# Patient Record
Sex: MALE | Race: WHITE | NOT HISPANIC OR LATINO | Employment: OTHER | URBAN - METROPOLITAN AREA
[De-identification: names, ages, dates, MRNs, and addresses within clinical notes are randomized per-mention and may not be internally consistent; named-entity substitution may affect disease eponyms.]

---

## 2023-10-06 ENCOUNTER — OFFICE VISIT (OUTPATIENT)
Dept: FAMILY MEDICINE | Facility: CLINIC | Age: 68
End: 2023-10-06
Payer: MEDICARE

## 2023-10-06 VITALS
TEMPERATURE: 97 F | HEART RATE: 72 BPM | SYSTOLIC BLOOD PRESSURE: 140 MMHG | WEIGHT: 183.44 LBS | HEIGHT: 72 IN | OXYGEN SATURATION: 98 % | DIASTOLIC BLOOD PRESSURE: 86 MMHG | BODY MASS INDEX: 24.85 KG/M2

## 2023-10-06 DIAGNOSIS — Z87.898 HISTORY OF ELEVATED PSA: ICD-10-CM

## 2023-10-06 DIAGNOSIS — G89.4 CHRONIC PAIN SYNDROME: ICD-10-CM

## 2023-10-06 DIAGNOSIS — H91.90 HEARING LOSS, UNSPECIFIED HEARING LOSS TYPE, UNSPECIFIED LATERALITY: Primary | ICD-10-CM

## 2023-10-06 DIAGNOSIS — R03.0 BLOOD PRESSURE ELEVATED WITHOUT HISTORY OF HTN: ICD-10-CM

## 2023-10-06 PROCEDURE — 1101F PR PT FALLS ASSESS DOC 0-1 FALLS W/OUT INJ PAST YR: ICD-10-PCS | Mod: CPTII,S$GLB,, | Performed by: FAMILY MEDICINE

## 2023-10-06 PROCEDURE — 99999 PR PBB SHADOW E&M-NEW PATIENT-LVL III: ICD-10-PCS | Mod: PBBFAC,,, | Performed by: FAMILY MEDICINE

## 2023-10-06 PROCEDURE — 99999 PR PBB SHADOW E&M-NEW PATIENT-LVL III: CPT | Mod: PBBFAC,,, | Performed by: FAMILY MEDICINE

## 2023-10-06 PROCEDURE — 3288F PR FALLS RISK ASSESSMENT DOCUMENTED: ICD-10-PCS | Mod: CPTII,S$GLB,, | Performed by: FAMILY MEDICINE

## 2023-10-06 PROCEDURE — 3079F DIAST BP 80-89 MM HG: CPT | Mod: CPTII,S$GLB,, | Performed by: FAMILY MEDICINE

## 2023-10-06 PROCEDURE — 3008F PR BODY MASS INDEX (BMI) DOCUMENTED: ICD-10-PCS | Mod: CPTII,S$GLB,, | Performed by: FAMILY MEDICINE

## 2023-10-06 PROCEDURE — 1159F PR MEDICATION LIST DOCUMENTED IN MEDICAL RECORD: ICD-10-PCS | Mod: CPTII,S$GLB,, | Performed by: FAMILY MEDICINE

## 2023-10-06 PROCEDURE — 1159F MED LIST DOCD IN RCRD: CPT | Mod: CPTII,S$GLB,, | Performed by: FAMILY MEDICINE

## 2023-10-06 PROCEDURE — 99203 OFFICE O/P NEW LOW 30 MIN: CPT | Mod: S$GLB,,, | Performed by: FAMILY MEDICINE

## 2023-10-06 PROCEDURE — 3079F PR MOST RECENT DIASTOLIC BLOOD PRESSURE 80-89 MM HG: ICD-10-PCS | Mod: CPTII,S$GLB,, | Performed by: FAMILY MEDICINE

## 2023-10-06 PROCEDURE — 1126F PR PAIN SEVERITY QUANTIFIED, NO PAIN PRESENT: ICD-10-PCS | Mod: CPTII,S$GLB,, | Performed by: FAMILY MEDICINE

## 2023-10-06 PROCEDURE — 1126F AMNT PAIN NOTED NONE PRSNT: CPT | Mod: CPTII,S$GLB,, | Performed by: FAMILY MEDICINE

## 2023-10-06 PROCEDURE — 1101F PT FALLS ASSESS-DOCD LE1/YR: CPT | Mod: CPTII,S$GLB,, | Performed by: FAMILY MEDICINE

## 2023-10-06 PROCEDURE — 3077F SYST BP >= 140 MM HG: CPT | Mod: CPTII,S$GLB,, | Performed by: FAMILY MEDICINE

## 2023-10-06 PROCEDURE — 99203 PR OFFICE/OUTPT VISIT, NEW, LEVL III, 30-44 MIN: ICD-10-PCS | Mod: S$GLB,,, | Performed by: FAMILY MEDICINE

## 2023-10-06 PROCEDURE — 3288F FALL RISK ASSESSMENT DOCD: CPT | Mod: CPTII,S$GLB,, | Performed by: FAMILY MEDICINE

## 2023-10-06 PROCEDURE — 3077F PR MOST RECENT SYSTOLIC BLOOD PRESSURE >= 140 MM HG: ICD-10-PCS | Mod: CPTII,S$GLB,, | Performed by: FAMILY MEDICINE

## 2023-10-06 PROCEDURE — 3008F BODY MASS INDEX DOCD: CPT | Mod: CPTII,S$GLB,, | Performed by: FAMILY MEDICINE

## 2023-10-06 RX ORDER — OXYCODONE AND ACETAMINOPHEN 5; 325 MG/1; MG/1
2 TABLET ORAL EVERY 4 HOURS PRN
COMMUNITY
End: 2024-01-09

## 2023-10-06 NOTE — PROGRESS NOTES
"Subjective:       Patient ID: Tae Nelson JR is a 67 y.o. male.    Chief Complaint: Establish Care      HPI Comments:       Current Outpatient Medications:     oxyCODONE-acetaminophen (PERCOCET) 5-325 mg per tablet, Take 2 tablets by mouth every 4 (four) hours as needed., Disp: , Rfl:       This my 1st time seeing this patient.  He would like to establish care.  He lives in this area.      Complains of many months or years of hearing loss and tinnitus.  No recent changes.  Never had it evaluated.    Past medical history:  Head problems with "swelling" in his nasopharynx and oropharynx.  Apparently related to infections.  Followed by Infectious Disease, though apparently not recently..  Has had biopsies done.  At 1 point he had chronic headaches and was on chronic pain medication.  No longer taking any pain medication.       Has occasional headaches now.    Saw urology years ago for elevated PSA.  Apparently had a benign biopsy.  Also there was some concern about hypogonadism, although I see a documented testosterone over 700.    Will be coming back again soon for a checkup.    Says he is had elevated blood pressures from time to time but never been diagnosed or treated for hypertension        Review of Systems   Constitutional:  Negative for activity change, appetite change and fever.   HENT:  Positive for hearing loss and tinnitus. Negative for ear discharge, ear pain and sore throat.    Respiratory:  Negative for cough and shortness of breath.    Cardiovascular:  Negative for chest pain.   Gastrointestinal:  Negative for abdominal pain, diarrhea and nausea.   Genitourinary:  Negative for difficulty urinating.   Musculoskeletal:  Negative for arthralgias and myalgias.   Neurological:  Negative for dizziness and headaches.       Objective:      Vitals:    10/06/23 1323   BP: (!) 140/86   Pulse: 72   Temp: 97 °F (36.1 °C)   TempSrc: Tympanic   SpO2: 98%   Weight: 83.2 kg (183 lb 6.8 oz)   Height: 6' (1.829 m) "   PainSc: 0-No pain     Physical Exam  Vitals and nursing note reviewed.   Constitutional:       General: He is not in acute distress.     Appearance: He is well-developed. He is not diaphoretic.   HENT:      Head: Normocephalic.      Right Ear: Tympanic membrane, ear canal and external ear normal.      Left Ear: Tympanic membrane, ear canal and external ear normal.   Neck:      Thyroid: No thyromegaly.   Cardiovascular:      Rate and Rhythm: Normal rate and regular rhythm.      Heart sounds: Normal heart sounds. No murmur heard.  Pulmonary:      Effort: Pulmonary effort is normal.      Breath sounds: Normal breath sounds. No wheezing or rales.   Abdominal:      General: There is no distension.      Palpations: Abdomen is soft.   Musculoskeletal:      Cervical back: Neck supple.   Lymphadenopathy:      Cervical: No cervical adenopathy.   Skin:     General: Skin is warm and dry.   Neurological:      Mental Status: He is alert and oriented to person, place, and time.   Psychiatric:         Behavior: Behavior normal.         Thought Content: Thought content normal.         Judgment: Judgment normal.         Assessment:       1. Hearing loss, unspecified hearing loss type, unspecified laterality    2. Chronic pain syndrome    3. History of elevated PSA    4. Blood pressure elevated without history of HTN        Plan:   Hearing loss, unspecified hearing loss type, unspecified laterality  Comments:  Normal ear exam.  Audiology consult  Orders:  -     Ambulatory referral/consult to Audiology; Future; Expected date: 10/13/2023    Chronic pain syndrome  Comments:  In the past.  Related to head and neck problems.  Not on any medication currently    History of elevated PSA  Comments:  Will follow-up with blood work at physical    Blood pressure elevated without history of HTN  Comments:  Follow-up within one-month

## 2023-10-13 ENCOUNTER — CLINICAL SUPPORT (OUTPATIENT)
Dept: AUDIOLOGY | Facility: CLINIC | Age: 68
End: 2023-10-13
Payer: MEDICARE

## 2023-10-13 DIAGNOSIS — H91.90 HEARING LOSS, UNSPECIFIED HEARING LOSS TYPE, UNSPECIFIED LATERALITY: ICD-10-CM

## 2023-10-13 DIAGNOSIS — H93.13 TINNITUS, BILATERAL: ICD-10-CM

## 2023-10-13 DIAGNOSIS — H90.3 SENSORINEURAL HEARING LOSS, BILATERAL: Primary | ICD-10-CM

## 2023-10-13 PROCEDURE — 92557 COMPREHENSIVE HEARING TEST: CPT | Mod: S$GLB,,, | Performed by: AUDIOLOGIST-HEARING AID FITTER

## 2023-10-13 PROCEDURE — 99999 PR PBB SHADOW E&M-EST. PATIENT-LVL I: ICD-10-PCS | Mod: PBBFAC,,, | Performed by: AUDIOLOGIST-HEARING AID FITTER

## 2023-10-13 PROCEDURE — 92567 TYMPANOMETRY: CPT | Mod: S$GLB,,, | Performed by: AUDIOLOGIST-HEARING AID FITTER

## 2023-10-13 PROCEDURE — 92557 PR COMPREHENSIVE HEARING TEST: ICD-10-PCS | Mod: S$GLB,,, | Performed by: AUDIOLOGIST-HEARING AID FITTER

## 2023-10-13 PROCEDURE — 92567 PR TYMPA2METRY: ICD-10-PCS | Mod: S$GLB,,, | Performed by: AUDIOLOGIST-HEARING AID FITTER

## 2023-10-13 PROCEDURE — 99999 PR PBB SHADOW E&M-EST. PATIENT-LVL I: CPT | Mod: PBBFAC,,, | Performed by: AUDIOLOGIST-HEARING AID FITTER

## 2023-10-13 NOTE — PROGRESS NOTES
Referring provider: Dr. Jack Nelson JR was seen 10/13/2023 for an audiological evaluation.  Patient complains of hearing loss in the bilateral ear starting many years ago and progressively declining. He reports tinnitus in the bilateral ear that has been present for years and is unchanged. No dizziness. No otalgia, aural fullness, pressure or otorrhea. He has family history of hearing loss (mother). He has history of loud noise exposure. Patient reports increased difficulty with speech clarity and tinnitus is now more bothersome.     Results reveal a normal-to-moderate sensorineural hearing loss 250-8000 Hz for the right ear, and a normal-to-moderately severe sensorineural hearing loss 250-8000 Hz for the left ear.   Speech Reception Thresholds were 40 dBHL for the right ear and 45 dBHL for the left ear.   Word recognition scores were fair for the right ear and good for the left ear.   Tympanograms were Type Ad for the right ear and Type Ad for the left ear.    Patient was counseled on the above findings.    Recommendations:  Hearing aids, binaural. Patient is provided a copy of his audiogram and hearing aid information packet. He will first check into insurance.   Audiogram every two years to monitor hearing loss, or sooner if any perceived changes in hearing.

## 2023-10-14 ENCOUNTER — PATIENT MESSAGE (OUTPATIENT)
Dept: ADMINISTRATIVE | Facility: HOSPITAL | Age: 68
End: 2023-10-14
Payer: MEDICARE

## 2023-12-12 ENCOUNTER — LAB VISIT (OUTPATIENT)
Dept: LAB | Facility: HOSPITAL | Age: 68
End: 2023-12-12
Attending: FAMILY MEDICINE
Payer: MEDICARE

## 2023-12-12 ENCOUNTER — OFFICE VISIT (OUTPATIENT)
Dept: FAMILY MEDICINE | Facility: CLINIC | Age: 68
End: 2023-12-12
Payer: MEDICARE

## 2023-12-12 VITALS
SYSTOLIC BLOOD PRESSURE: 140 MMHG | DIASTOLIC BLOOD PRESSURE: 90 MMHG | BODY MASS INDEX: 25.75 KG/M2 | TEMPERATURE: 97 F | HEART RATE: 86 BPM | HEIGHT: 72 IN | WEIGHT: 190.13 LBS | OXYGEN SATURATION: 98 %

## 2023-12-12 DIAGNOSIS — G89.4 CHRONIC PAIN SYNDROME: ICD-10-CM

## 2023-12-12 DIAGNOSIS — Z12.5 SCREENING FOR PROSTATE CANCER: ICD-10-CM

## 2023-12-12 DIAGNOSIS — R79.9 ABNORMAL FINDING OF BLOOD CHEMISTRY, UNSPECIFIED: ICD-10-CM

## 2023-12-12 DIAGNOSIS — I10 PRIMARY HYPERTENSION: ICD-10-CM

## 2023-12-12 DIAGNOSIS — Z00.00 ANNUAL PHYSICAL EXAM: Primary | ICD-10-CM

## 2023-12-12 DIAGNOSIS — Z12.11 SCREENING FOR COLON CANCER: ICD-10-CM

## 2023-12-12 DIAGNOSIS — Z00.00 ANNUAL PHYSICAL EXAM: ICD-10-CM

## 2023-12-12 LAB
ALBUMIN SERPL BCP-MCNC: 3.8 G/DL (ref 3.5–5.2)
ALP SERPL-CCNC: 83 U/L (ref 55–135)
ALT SERPL W/O P-5'-P-CCNC: 16 U/L (ref 10–44)
ANION GAP SERPL CALC-SCNC: 11 MMOL/L (ref 8–16)
AST SERPL-CCNC: 19 U/L (ref 10–40)
BASOPHILS # BLD AUTO: 0.06 K/UL (ref 0–0.2)
BASOPHILS NFR BLD: 1 % (ref 0–1.9)
BILIRUB SERPL-MCNC: 0.7 MG/DL (ref 0.1–1)
BUN SERPL-MCNC: 11 MG/DL (ref 8–23)
CALCIUM SERPL-MCNC: 9.3 MG/DL (ref 8.7–10.5)
CHLORIDE SERPL-SCNC: 103 MMOL/L (ref 95–110)
CHOLEST SERPL-MCNC: 176 MG/DL (ref 120–199)
CHOLEST/HDLC SERPL: 3.9 {RATIO} (ref 2–5)
CO2 SERPL-SCNC: 23 MMOL/L (ref 23–29)
COMPLEXED PSA SERPL-MCNC: 4.6 NG/ML (ref 0–4)
CREAT SERPL-MCNC: 0.9 MG/DL (ref 0.5–1.4)
DIFFERENTIAL METHOD: ABNORMAL
EOSINOPHIL # BLD AUTO: 0.1 K/UL (ref 0–0.5)
EOSINOPHIL NFR BLD: 2 % (ref 0–8)
ERYTHROCYTE [DISTWIDTH] IN BLOOD BY AUTOMATED COUNT: 11.9 % (ref 11.5–14.5)
EST. GFR  (NO RACE VARIABLE): >60 ML/MIN/1.73 M^2
ESTIMATED AVG GLUCOSE: 108 MG/DL (ref 68–131)
GLUCOSE SERPL-MCNC: 83 MG/DL (ref 70–110)
HBA1C MFR BLD: 5.4 % (ref 4–5.6)
HCT VFR BLD AUTO: 47.2 % (ref 40–54)
HCV AB SERPL QL IA: NORMAL
HDLC SERPL-MCNC: 45 MG/DL (ref 40–75)
HDLC SERPL: 25.6 % (ref 20–50)
HGB BLD-MCNC: 16.9 G/DL (ref 14–18)
IMM GRANULOCYTES # BLD AUTO: 0.01 K/UL (ref 0–0.04)
IMM GRANULOCYTES NFR BLD AUTO: 0.2 % (ref 0–0.5)
LDLC SERPL CALC-MCNC: 118.2 MG/DL (ref 63–159)
LYMPHOCYTES # BLD AUTO: 2 K/UL (ref 1–4.8)
LYMPHOCYTES NFR BLD: 33.8 % (ref 18–48)
MCH RBC QN AUTO: 33.2 PG (ref 27–31)
MCHC RBC AUTO-ENTMCNC: 35.8 G/DL (ref 32–36)
MCV RBC AUTO: 93 FL (ref 82–98)
MONOCYTES # BLD AUTO: 0.5 K/UL (ref 0.3–1)
MONOCYTES NFR BLD: 9.1 % (ref 4–15)
NEUTROPHILS # BLD AUTO: 3.2 K/UL (ref 1.8–7.7)
NEUTROPHILS NFR BLD: 53.9 % (ref 38–73)
NONHDLC SERPL-MCNC: 131 MG/DL
NRBC BLD-RTO: 0 /100 WBC
PLATELET # BLD AUTO: 208 K/UL (ref 150–450)
PMV BLD AUTO: 9.9 FL (ref 9.2–12.9)
POTASSIUM SERPL-SCNC: 3.9 MMOL/L (ref 3.5–5.1)
PROT SERPL-MCNC: 8 G/DL (ref 6–8.4)
RBC # BLD AUTO: 5.09 M/UL (ref 4.6–6.2)
SODIUM SERPL-SCNC: 137 MMOL/L (ref 136–145)
TRIGL SERPL-MCNC: 64 MG/DL (ref 30–150)
TSH SERPL DL<=0.005 MIU/L-ACNC: 1.91 UIU/ML (ref 0.4–4)
WBC # BLD AUTO: 5.94 K/UL (ref 3.9–12.7)

## 2023-12-12 PROCEDURE — 1126F PR PAIN SEVERITY QUANTIFIED, NO PAIN PRESENT: ICD-10-PCS | Mod: CPTII,S$GLB,, | Performed by: FAMILY MEDICINE

## 2023-12-12 PROCEDURE — 3288F FALL RISK ASSESSMENT DOCD: CPT | Mod: CPTII,S$GLB,, | Performed by: FAMILY MEDICINE

## 2023-12-12 PROCEDURE — 3077F PR MOST RECENT SYSTOLIC BLOOD PRESSURE >= 140 MM HG: ICD-10-PCS | Mod: CPTII,S$GLB,, | Performed by: FAMILY MEDICINE

## 2023-12-12 PROCEDURE — 99999 PR PBB SHADOW E&M-EST. PATIENT-LVL III: CPT | Mod: PBBFAC,,, | Performed by: FAMILY MEDICINE

## 2023-12-12 PROCEDURE — 99214 PR OFFICE/OUTPT VISIT, EST, LEVL IV, 30-39 MIN: ICD-10-PCS | Mod: S$GLB,,, | Performed by: FAMILY MEDICINE

## 2023-12-12 PROCEDURE — 85025 COMPLETE CBC W/AUTO DIFF WBC: CPT | Performed by: FAMILY MEDICINE

## 2023-12-12 PROCEDURE — 84153 ASSAY OF PSA TOTAL: CPT | Performed by: FAMILY MEDICINE

## 2023-12-12 PROCEDURE — 99214 OFFICE O/P EST MOD 30 MIN: CPT | Mod: S$GLB,,, | Performed by: FAMILY MEDICINE

## 2023-12-12 PROCEDURE — 80061 LIPID PANEL: CPT | Performed by: FAMILY MEDICINE

## 2023-12-12 PROCEDURE — 99999 PR PBB SHADOW E&M-EST. PATIENT-LVL III: ICD-10-PCS | Mod: PBBFAC,,, | Performed by: FAMILY MEDICINE

## 2023-12-12 PROCEDURE — 3077F SYST BP >= 140 MM HG: CPT | Mod: CPTII,S$GLB,, | Performed by: FAMILY MEDICINE

## 2023-12-12 PROCEDURE — 1159F MED LIST DOCD IN RCRD: CPT | Mod: CPTII,S$GLB,, | Performed by: FAMILY MEDICINE

## 2023-12-12 PROCEDURE — 3080F PR MOST RECENT DIASTOLIC BLOOD PRESSURE >= 90 MM HG: ICD-10-PCS | Mod: CPTII,S$GLB,, | Performed by: FAMILY MEDICINE

## 2023-12-12 PROCEDURE — 3008F BODY MASS INDEX DOCD: CPT | Mod: CPTII,S$GLB,, | Performed by: FAMILY MEDICINE

## 2023-12-12 PROCEDURE — 86803 HEPATITIS C AB TEST: CPT | Performed by: FAMILY MEDICINE

## 2023-12-12 PROCEDURE — 3288F PR FALLS RISK ASSESSMENT DOCUMENTED: ICD-10-PCS | Mod: CPTII,S$GLB,, | Performed by: FAMILY MEDICINE

## 2023-12-12 PROCEDURE — 1101F PT FALLS ASSESS-DOCD LE1/YR: CPT | Mod: CPTII,S$GLB,, | Performed by: FAMILY MEDICINE

## 2023-12-12 PROCEDURE — 84443 ASSAY THYROID STIM HORMONE: CPT | Performed by: FAMILY MEDICINE

## 2023-12-12 PROCEDURE — 3008F PR BODY MASS INDEX (BMI) DOCUMENTED: ICD-10-PCS | Mod: CPTII,S$GLB,, | Performed by: FAMILY MEDICINE

## 2023-12-12 PROCEDURE — 36415 COLL VENOUS BLD VENIPUNCTURE: CPT | Mod: PO | Performed by: FAMILY MEDICINE

## 2023-12-12 PROCEDURE — 1126F AMNT PAIN NOTED NONE PRSNT: CPT | Mod: CPTII,S$GLB,, | Performed by: FAMILY MEDICINE

## 2023-12-12 PROCEDURE — 83036 HEMOGLOBIN GLYCOSYLATED A1C: CPT | Performed by: FAMILY MEDICINE

## 2023-12-12 PROCEDURE — 1101F PR PT FALLS ASSESS DOC 0-1 FALLS W/OUT INJ PAST YR: ICD-10-PCS | Mod: CPTII,S$GLB,, | Performed by: FAMILY MEDICINE

## 2023-12-12 PROCEDURE — 3080F DIAST BP >= 90 MM HG: CPT | Mod: CPTII,S$GLB,, | Performed by: FAMILY MEDICINE

## 2023-12-12 PROCEDURE — 1159F PR MEDICATION LIST DOCUMENTED IN MEDICAL RECORD: ICD-10-PCS | Mod: CPTII,S$GLB,, | Performed by: FAMILY MEDICINE

## 2023-12-12 PROCEDURE — 80053 COMPREHEN METABOLIC PANEL: CPT | Performed by: FAMILY MEDICINE

## 2023-12-12 RX ORDER — LOSARTAN POTASSIUM 50 MG/1
50 TABLET ORAL NIGHTLY
Qty: 30 TABLET | Refills: 1 | Status: SHIPPED | OUTPATIENT
Start: 2023-12-12 | End: 2024-01-12 | Stop reason: SDUPTHER

## 2023-12-12 NOTE — PROGRESS NOTES
Subjective:       Patient ID: Tae Nelson JR is a 68 y.o. male.    Chief Complaint: Annual Exam      HPI Comments:       Current Outpatient Medications:     oxyCODONE-acetaminophen (PERCOCET) 5-325 mg per tablet, Take 2 tablets by mouth every 4 (four) hours as needed., Disp: , Rfl:     losartan (COZAAR) 50 MG tablet, Take 1 tablet (50 mg total) by mouth every evening., Disp: 30 tablet, Rfl: 1      This is our initial follow-up visit.  He establish care with me 2 months ago.      Here for general checkup today.  Says he is doing well.  Recently had hearing test and plans to get hearing aids soon.    Says he has had a colonoscopy years ago that was normal.  No follow up and does not want to do anymore.  Will do Cologuard test.      Looks like he has hypertension.  Never been treated, but willing to take medication every day.  Will start losartan.      No tobacco products.  Smokes marijuana on a non daily basis.  No alcohol.  Exercise with hunting    Nocturia 0-1 time per night      Review of Systems   Constitutional:  Negative for activity change, appetite change and fever.   HENT:  Positive for hearing loss. Negative for sore throat.    Respiratory:  Negative for cough and shortness of breath.    Cardiovascular:  Negative for chest pain.   Gastrointestinal:  Negative for abdominal pain, diarrhea and nausea.   Genitourinary:  Negative for difficulty urinating.   Musculoskeletal:  Negative for arthralgias and myalgias.   Neurological:  Negative for dizziness and headaches.       Objective:      Vitals:    12/12/23 0833   BP: (!) 140/90   Pulse: 86   Temp: 96.6 °F (35.9 °C)   TempSrc: Tympanic   SpO2: 98%   Weight: 86.3 kg (190 lb 2.4 oz)   Height: 6' (1.829 m)   PainSc: 0-No pain     Physical Exam  Vitals and nursing note reviewed.   Constitutional:       General: He is not in acute distress.     Appearance: He is well-developed. He is not diaphoretic.   HENT:      Head: Normocephalic.      Mouth/Throat:       Pharynx: No oropharyngeal exudate.   Neck:      Thyroid: No thyromegaly.   Cardiovascular:      Rate and Rhythm: Normal rate and regular rhythm.      Heart sounds: Normal heart sounds. No murmur heard.  Pulmonary:      Effort: Pulmonary effort is normal.      Breath sounds: Normal breath sounds. No wheezing or rales.   Abdominal:      General: There is no distension.      Palpations: Abdomen is soft. There is no hepatomegaly, splenomegaly or mass.      Tenderness: There is no abdominal tenderness.   Musculoskeletal:      Cervical back: Neck supple.      Right lower leg: No edema.      Left lower leg: No edema.   Lymphadenopathy:      Cervical: No cervical adenopathy.   Skin:     General: Skin is warm and dry.   Neurological:      Mental Status: He is alert and oriented to person, place, and time.   Psychiatric:         Mood and Affect: Mood normal.         Behavior: Behavior normal.         Thought Content: Thought content normal.         Judgment: Judgment normal.         Assessment:       1. Annual physical exam    2. Chronic pain syndrome    3. Primary hypertension    4. Screening for colon cancer    5. Screening for prostate cancer    6. Abnormal finding of blood chemistry, unspecified        Plan:   Annual physical exam  Comments:  Lipid profile, A1c  Orders:  -     Hemoglobin A1C; Future; Expected date: 12/12/2023  -     Comprehensive Metabolic Panel; Future; Expected date: 12/12/2023  -     CBC Auto Differential; Future; Expected date: 12/12/2023  -     Hepatitis C Antibody; Future; Expected date: 12/12/2023  -     Lipid Panel; Future; Expected date: 12/12/2023  -     TSH; Future; Expected date: 12/12/2023    Chronic pain syndrome  Comments:  No current treatments or medications    Primary hypertension  Comments:  Start losartan.  Follow-up one-month  Orders:  -     Hemoglobin A1C; Future; Expected date: 12/12/2023  -     Comprehensive Metabolic Panel; Future; Expected date: 12/12/2023  -     CBC Auto  Differential; Future; Expected date: 12/12/2023  -     TSH; Future; Expected date: 12/12/2023    Screening for colon cancer  Comments:  Declines colonoscopy.  Cologuard ordered    Screening for prostate cancer  Comments:  PSA today  Orders:  -     PSA, Screening; Future; Expected date: 12/12/2023  -     Cologuard Screening (Multitarget Stool DNA); Future; Expected date: 12/12/2023    Abnormal finding of blood chemistry, unspecified  -     Hemoglobin A1C; Future; Expected date: 12/12/2023  -     Lipid Panel; Future; Expected date: 12/12/2023    Other orders  -     losartan (COZAAR) 50 MG tablet; Take 1 tablet (50 mg total) by mouth every evening.  Dispense: 30 tablet; Refill: 1

## 2023-12-16 DIAGNOSIS — R97.20 ELEVATED PSA: Primary | ICD-10-CM

## 2023-12-18 ENCOUNTER — TELEPHONE (OUTPATIENT)
Dept: FAMILY MEDICINE | Facility: CLINIC | Age: 68
End: 2023-12-18
Payer: MEDICARE

## 2023-12-18 NOTE — TELEPHONE ENCOUNTER
Called pt n/a lvm to call back to get urology appt scheduled and go over results per Dr. Santiago   Blood work all looks good except for a slightly elevated PSA prostate test.  Need to have you see a urologist to have this looked at further.    Please schedule the urology consult I ordered

## 2023-12-18 NOTE — TELEPHONE ENCOUNTER
----- Message from Orion Santiago MD sent at 12/16/2023 11:56 AM CST -----  Blood work all looks good except for a slightly elevated PSA prostate test.  Need to have you see a urologist to have this looked at further.    Please schedule the urology consult I ordered

## 2023-12-18 NOTE — TELEPHONE ENCOUNTER
Spoke with pt wife she confirmed information informed her of results she verbalized understanding. Urology called scheduled appt for pt [id number]

## 2023-12-28 LAB — NONINV COLON CA DNA+OCC BLD SCRN STL QL: NEGATIVE

## 2023-12-29 ENCOUNTER — TELEPHONE (OUTPATIENT)
Dept: FAMILY MEDICINE | Facility: CLINIC | Age: 68
End: 2023-12-29
Payer: MEDICARE

## 2023-12-29 NOTE — TELEPHONE ENCOUNTER
----- Message from Orion Santiago MD sent at 12/29/2023  6:30 AM CST -----  Your Cologuard stool test was negative

## 2024-01-09 ENCOUNTER — OFFICE VISIT (OUTPATIENT)
Dept: UROLOGY | Facility: CLINIC | Age: 69
End: 2024-01-09
Payer: MEDICARE

## 2024-01-09 ENCOUNTER — LAB VISIT (OUTPATIENT)
Dept: LAB | Facility: HOSPITAL | Age: 69
End: 2024-01-09
Attending: UROLOGY
Payer: MEDICARE

## 2024-01-09 VITALS
SYSTOLIC BLOOD PRESSURE: 133 MMHG | WEIGHT: 190.25 LBS | BODY MASS INDEX: 25.77 KG/M2 | DIASTOLIC BLOOD PRESSURE: 93 MMHG | HEART RATE: 78 BPM | HEIGHT: 72 IN

## 2024-01-09 DIAGNOSIS — R82.81 PYURIA: ICD-10-CM

## 2024-01-09 DIAGNOSIS — R97.20 ELEVATED PSA: Primary | ICD-10-CM

## 2024-01-09 DIAGNOSIS — R97.20 ELEVATED PSA: ICD-10-CM

## 2024-01-09 PROCEDURE — 3008F BODY MASS INDEX DOCD: CPT | Mod: CPTII,S$GLB,, | Performed by: UROLOGY

## 2024-01-09 PROCEDURE — 99999 PR PBB SHADOW E&M-EST. PATIENT-LVL III: CPT | Mod: PBBFAC,,, | Performed by: UROLOGY

## 2024-01-09 PROCEDURE — 84154 ASSAY OF PSA FREE: CPT | Performed by: UROLOGY

## 2024-01-09 PROCEDURE — 81001 URINALYSIS AUTO W/SCOPE: CPT | Performed by: UROLOGY

## 2024-01-09 PROCEDURE — 82565 ASSAY OF CREATININE: CPT | Performed by: UROLOGY

## 2024-01-09 PROCEDURE — 3288F FALL RISK ASSESSMENT DOCD: CPT | Mod: CPTII,S$GLB,, | Performed by: UROLOGY

## 2024-01-09 PROCEDURE — 1160F RVW MEDS BY RX/DR IN RCRD: CPT | Mod: CPTII,S$GLB,, | Performed by: UROLOGY

## 2024-01-09 PROCEDURE — 84153 ASSAY OF PSA TOTAL: CPT | Performed by: UROLOGY

## 2024-01-09 PROCEDURE — 99204 OFFICE O/P NEW MOD 45 MIN: CPT | Mod: S$GLB,,, | Performed by: UROLOGY

## 2024-01-09 PROCEDURE — 36415 COLL VENOUS BLD VENIPUNCTURE: CPT | Performed by: UROLOGY

## 2024-01-09 PROCEDURE — 3075F SYST BP GE 130 - 139MM HG: CPT | Mod: CPTII,S$GLB,, | Performed by: UROLOGY

## 2024-01-09 PROCEDURE — 87186 SC STD MICRODIL/AGAR DIL: CPT | Performed by: UROLOGY

## 2024-01-09 PROCEDURE — 1159F MED LIST DOCD IN RCRD: CPT | Mod: CPTII,S$GLB,, | Performed by: UROLOGY

## 2024-01-09 PROCEDURE — 87077 CULTURE AEROBIC IDENTIFY: CPT | Performed by: UROLOGY

## 2024-01-09 PROCEDURE — 3080F DIAST BP >= 90 MM HG: CPT | Mod: CPTII,S$GLB,, | Performed by: UROLOGY

## 2024-01-09 PROCEDURE — 1101F PT FALLS ASSESS-DOCD LE1/YR: CPT | Mod: CPTII,S$GLB,, | Performed by: UROLOGY

## 2024-01-09 PROCEDURE — 87088 URINE BACTERIA CULTURE: CPT | Performed by: UROLOGY

## 2024-01-09 PROCEDURE — 87086 URINE CULTURE/COLONY COUNT: CPT | Performed by: UROLOGY

## 2024-01-09 NOTE — PROGRESS NOTES
Chief Complaint:   Encounter Diagnoses   Name Primary?    Elevated PSA Yes    Pyuria        HPI:  HPI Tae Nelson JR edmond 68 y.o. male who presents as a referral for elevated PSA.  Patient does not get routine healthcare.  He states he was just now getting back to seeing a doctor routinely.  He recently had a checkup with his PCP and his PSA was slightly elevated at 4.6.  In reviewing his medical records patient underwent a prostate biopsy in April of 2014 due to a PSA of 5.0.  At that time biopsy results were benign.  Patient had no further follow up with Urology after that.  He does have nocturia x1.  He has been noticing dark and foul odor urine for some time.  He denies any gross hematuria or dysuria.  He does occasionally have urgency.  Denies any urge incontinence.  He does have postvoid dribbling.    His father passed of prostate cancer.    History:  Social History     Tobacco Use    Smoking status: Never    Smokeless tobacco: Never    Tobacco comments:     Smokes weed   Substance Use Topics    Alcohol use: Never    Drug use: Never     No past medical history on file.  No past surgical history on file.  Family History   Problem Relation Age of Onset    Cancer Mother     Prostate cancer Father        Current Outpatient Medications on File Prior to Visit   Medication Sig Dispense Refill    losartan (COZAAR) 50 MG tablet Take 1 tablet (50 mg total) by mouth every evening. 30 tablet 1    oxyCODONE-acetaminophen (PERCOCET) 5-325 mg per tablet Take 2 tablets by mouth every 4 (four) hours as needed.       No current facility-administered medications on file prior to visit.        Objective:     Vitals:    01/09/24 0910   BP: (!) 133/93   Pulse: 78   Weight: 86.3 kg (190 lb 4.1 oz)   Height: 6' (1.829 m)      BMI Readings from Last 1 Encounters:   01/09/24 25.80 kg/m²          Physical Exam  No acute distress alert and oriented  Respirations even unlabored   Digital rectal exam reveals a 60 g prostate  smooth      Lab Results   Component Value Date    PSA 4.6 (H) 12/12/2023        Lab Results   Component Value Date    CREATININE 0.9 12/12/2023      Assessment:       1. Elevated PSA    2. Pyuria        Plan:     1. Elevated PSA    2. Pyuria       Orders Placed This Encounter    CULTURE, URINE    MRI Prostate W W/O Contrast    PROSTATE HEALTH INDEX (phi)    CREATININE, SERUM    Urinalysis Microscopic    POCT Urinalysis, Dipstick, Automated, W/O Scope      Elevated PSA.  BPH with lower urinary tract symptoms.  Urinalysis consistent with UTI today.  We will send a urine culture.  We will go ahead and get a prostate health index on him today.  We will also schedule MRI of his prostate.  We will see him back after these to discuss next steps.

## 2024-01-10 LAB
-2 PROPSA (PHI): 6.8 PG/ML
BACTERIA #/AREA URNS AUTO: ABNORMAL /HPF
CREAT SERPL-MCNC: 1 MG/DL (ref 0.5–1.4)
EST. GFR  (NO RACE VARIABLE): >60 ML/MIN/1.73 M^2
FREE PSA (PHI): 1 NG/ML
MICROSCOPIC COMMENT: ABNORMAL
PROSTATE HEALTH INDEX VALUE (PHI): 16.8
PSA FREE MFR SERPL: 16 %
PSA SERPL-MCNC: 6.1 NG/ML
RBC #/AREA URNS AUTO: 3 /HPF (ref 0–4)
WBC #/AREA URNS AUTO: 64 /HPF (ref 0–5)

## 2024-01-12 ENCOUNTER — LAB VISIT (OUTPATIENT)
Dept: LAB | Facility: HOSPITAL | Age: 69
End: 2024-01-12
Attending: FAMILY MEDICINE
Payer: MEDICARE

## 2024-01-12 ENCOUNTER — OFFICE VISIT (OUTPATIENT)
Dept: FAMILY MEDICINE | Facility: CLINIC | Age: 69
End: 2024-01-12
Payer: MEDICARE

## 2024-01-12 VITALS
HEIGHT: 72 IN | TEMPERATURE: 97 F | WEIGHT: 185.06 LBS | OXYGEN SATURATION: 96 % | BODY MASS INDEX: 25.06 KG/M2 | DIASTOLIC BLOOD PRESSURE: 90 MMHG | SYSTOLIC BLOOD PRESSURE: 160 MMHG

## 2024-01-12 DIAGNOSIS — I10 PRIMARY HYPERTENSION: Primary | ICD-10-CM

## 2024-01-12 DIAGNOSIS — Z12.11 SCREENING FOR COLON CANCER: ICD-10-CM

## 2024-01-12 DIAGNOSIS — R97.20 ELEVATED PSA: ICD-10-CM

## 2024-01-12 DIAGNOSIS — I10 PRIMARY HYPERTENSION: ICD-10-CM

## 2024-01-12 LAB
ANION GAP SERPL CALC-SCNC: 4 MMOL/L (ref 8–16)
BACTERIA UR CULT: ABNORMAL
BUN SERPL-MCNC: 11 MG/DL (ref 8–23)
CALCIUM SERPL-MCNC: 9.1 MG/DL (ref 8.7–10.5)
CHLORIDE SERPL-SCNC: 104 MMOL/L (ref 95–110)
CO2 SERPL-SCNC: 28 MMOL/L (ref 23–29)
CREAT SERPL-MCNC: 0.8 MG/DL (ref 0.5–1.4)
EST. GFR  (NO RACE VARIABLE): >60 ML/MIN/1.73 M^2
GLUCOSE SERPL-MCNC: 87 MG/DL (ref 70–110)
POTASSIUM SERPL-SCNC: 4.3 MMOL/L (ref 3.5–5.1)
SODIUM SERPL-SCNC: 136 MMOL/L (ref 136–145)

## 2024-01-12 PROCEDURE — 36415 COLL VENOUS BLD VENIPUNCTURE: CPT | Mod: PO | Performed by: FAMILY MEDICINE

## 2024-01-12 PROCEDURE — 3077F SYST BP >= 140 MM HG: CPT | Mod: CPTII,S$GLB,, | Performed by: FAMILY MEDICINE

## 2024-01-12 PROCEDURE — 4010F ACE/ARB THERAPY RXD/TAKEN: CPT | Mod: CPTII,S$GLB,, | Performed by: FAMILY MEDICINE

## 2024-01-12 PROCEDURE — 99999 PR PBB SHADOW E&M-EST. PATIENT-LVL III: CPT | Mod: PBBFAC,,, | Performed by: FAMILY MEDICINE

## 2024-01-12 PROCEDURE — 3080F DIAST BP >= 90 MM HG: CPT | Mod: CPTII,S$GLB,, | Performed by: FAMILY MEDICINE

## 2024-01-12 PROCEDURE — 3288F FALL RISK ASSESSMENT DOCD: CPT | Mod: CPTII,S$GLB,, | Performed by: FAMILY MEDICINE

## 2024-01-12 PROCEDURE — 1126F AMNT PAIN NOTED NONE PRSNT: CPT | Mod: CPTII,S$GLB,, | Performed by: FAMILY MEDICINE

## 2024-01-12 PROCEDURE — 80048 BASIC METABOLIC PNL TOTAL CA: CPT | Performed by: FAMILY MEDICINE

## 2024-01-12 PROCEDURE — 99214 OFFICE O/P EST MOD 30 MIN: CPT | Mod: S$GLB,,, | Performed by: FAMILY MEDICINE

## 2024-01-12 PROCEDURE — 1101F PT FALLS ASSESS-DOCD LE1/YR: CPT | Mod: CPTII,S$GLB,, | Performed by: FAMILY MEDICINE

## 2024-01-12 PROCEDURE — 3008F BODY MASS INDEX DOCD: CPT | Mod: CPTII,S$GLB,, | Performed by: FAMILY MEDICINE

## 2024-01-12 PROCEDURE — 1159F MED LIST DOCD IN RCRD: CPT | Mod: CPTII,S$GLB,, | Performed by: FAMILY MEDICINE

## 2024-01-12 RX ORDER — LOSARTAN POTASSIUM 100 MG/1
100 TABLET ORAL NIGHTLY
Qty: 30 TABLET | Refills: 1 | Status: SHIPPED | OUTPATIENT
Start: 2024-01-12 | End: 2024-02-20 | Stop reason: SDUPTHER

## 2024-01-12 NOTE — PROGRESS NOTES
Subjective:       Patient ID: Tae Nelson JR is a 68 y.o. male.    Chief Complaint: Follow-up      HPI Comments:       Current Outpatient Medications:     losartan (COZAAR) 100 MG tablet, Take 1 tablet (100 mg total) by mouth every evening., Disp: 30 tablet, Rfl: 1      One-month follow-up on hypertension.  He has been taking losartan 50 every day.  Blood pressure reading at home 133/93.  Still elevated today.  Will increase dose.      Saw Urology.  Getting an MRI of his prostate.  Concerned about some infection but will not treat only after the MRI is done.    Cologuard was done was negative.      Review of Systems   Constitutional:  Negative for activity change, appetite change and fever.   HENT:  Negative for sore throat.    Respiratory:  Negative for cough and shortness of breath.    Cardiovascular:  Negative for chest pain.   Gastrointestinal:  Negative for abdominal pain, diarrhea and nausea.   Genitourinary:  Negative for difficulty urinating.   Musculoskeletal:  Negative for arthralgias and myalgias.   Neurological:  Negative for dizziness and headaches.       Objective:      Vitals:    01/12/24 0821   BP: (!) 160/90   Temp: 97.1 °F (36.2 °C)   SpO2: 96%   Weight: 84 kg (185 lb 1.2 oz)   Height: 6' (1.829 m)   PainSc: 0-No pain     Physical Exam  Vitals and nursing note reviewed.   Constitutional:       General: He is not in acute distress.     Appearance: He is well-developed. He is not diaphoretic.   HENT:      Head: Normocephalic.   Neck:      Thyroid: No thyromegaly.   Cardiovascular:      Rate and Rhythm: Normal rate and regular rhythm.      Heart sounds: Normal heart sounds. No murmur heard.  Pulmonary:      Effort: Pulmonary effort is normal.      Breath sounds: Normal breath sounds. No wheezing or rales.   Abdominal:      General: There is no distension.      Palpations: Abdomen is soft.   Musculoskeletal:      Cervical back: Neck supple.   Lymphadenopathy:      Cervical: No cervical adenopathy.    Skin:     General: Skin is warm and dry.   Neurological:      Mental Status: He is alert and oriented to person, place, and time.   Psychiatric:         Mood and Affect: Mood normal.         Behavior: Behavior normal.         Thought Content: Thought content normal.         Judgment: Judgment normal.         Assessment:       1. Primary hypertension    2. Elevated PSA    3. Screening for colon cancer        Plan:   Primary hypertension  Comments:  Increase losartan to 100 mg.  BMP today.  Follow-up one-month  Orders:  -     Basic Metabolic Panel; Future; Expected date: 01/12/2024    Elevated PSA  Comments:  Followed by urology    Screening for colon cancer  Comments:  Paulina negative    Other orders  -     losartan (COZAAR) 100 MG tablet; Take 1 tablet (100 mg total) by mouth every evening.  Dispense: 30 tablet; Refill: 1

## 2024-01-19 ENCOUNTER — HOSPITAL ENCOUNTER (OUTPATIENT)
Dept: RADIOLOGY | Facility: HOSPITAL | Age: 69
Discharge: HOME | End: 2024-01-19
Attending: UROLOGY
Payer: MEDICARE

## 2024-01-19 DIAGNOSIS — R97.20 ELEVATED PSA: ICD-10-CM

## 2024-01-19 PROCEDURE — A9585 GADOBUTROL INJECTION: HCPCS | Mod: PN | Performed by: UROLOGY

## 2024-01-19 PROCEDURE — 72197 MRI PELVIS W/O & W/DYE: CPT | Mod: 26,,, | Performed by: RADIOLOGY

## 2024-01-19 PROCEDURE — 25500020 PHARM REV CODE 255: Mod: PN | Performed by: UROLOGY

## 2024-01-19 PROCEDURE — 72197 MRI PELVIS W/O & W/DYE: CPT | Mod: TC,PN

## 2024-01-19 RX ORDER — GADOBUTROL 604.72 MG/ML
8.5 INJECTION INTRAVENOUS
Status: COMPLETED | OUTPATIENT
Start: 2024-01-19 | End: 2024-01-19

## 2024-01-19 RX ADMIN — GADOBUTROL 8.5 ML: 604.72 INJECTION INTRAVENOUS at 08:01

## 2024-01-22 ENCOUNTER — OFFICE VISIT (OUTPATIENT)
Dept: UROLOGY | Facility: CLINIC | Age: 69
End: 2024-01-22
Payer: MEDICARE

## 2024-01-22 VITALS
TEMPERATURE: 99 F | HEART RATE: 76 BPM | DIASTOLIC BLOOD PRESSURE: 85 MMHG | HEIGHT: 72 IN | BODY MASS INDEX: 25.06 KG/M2 | WEIGHT: 185.06 LBS | RESPIRATION RATE: 16 BRPM | SYSTOLIC BLOOD PRESSURE: 123 MMHG

## 2024-01-22 DIAGNOSIS — R35.1 BENIGN PROSTATIC HYPERPLASIA WITH NOCTURIA: ICD-10-CM

## 2024-01-22 DIAGNOSIS — R97.20 ELEVATED PSA: Primary | ICD-10-CM

## 2024-01-22 DIAGNOSIS — N40.1 BENIGN PROSTATIC HYPERPLASIA WITH NOCTURIA: ICD-10-CM

## 2024-01-22 LAB
BILIRUB UR QL STRIP: NEGATIVE
GLUCOSE UR QL STRIP: NEGATIVE
KETONES UR QL STRIP: NEGATIVE
LEUKOCYTE ESTERASE UR QL STRIP: POSITIVE
PH, POC UA: 5.5
POC BLOOD, URINE: POSITIVE
POC NITRATES, URINE: NEGATIVE
PROT UR QL STRIP: NEGATIVE
SP GR UR STRIP: 1.02 (ref 1–1.03)
UROBILINOGEN UR STRIP-ACNC: 0.2 (ref 0.3–2.2)

## 2024-01-22 PROCEDURE — 3079F DIAST BP 80-89 MM HG: CPT | Mod: CPTII,S$GLB,, | Performed by: UROLOGY

## 2024-01-22 PROCEDURE — 3288F FALL RISK ASSESSMENT DOCD: CPT | Mod: CPTII,S$GLB,, | Performed by: UROLOGY

## 2024-01-22 PROCEDURE — 3008F BODY MASS INDEX DOCD: CPT | Mod: CPTII,S$GLB,, | Performed by: UROLOGY

## 2024-01-22 PROCEDURE — 1159F MED LIST DOCD IN RCRD: CPT | Mod: CPTII,S$GLB,, | Performed by: UROLOGY

## 2024-01-22 PROCEDURE — 99999 PR PBB SHADOW E&M-EST. PATIENT-LVL III: CPT | Mod: PBBFAC,,, | Performed by: UROLOGY

## 2024-01-22 PROCEDURE — 3074F SYST BP LT 130 MM HG: CPT | Mod: CPTII,S$GLB,, | Performed by: UROLOGY

## 2024-01-22 PROCEDURE — 1101F PT FALLS ASSESS-DOCD LE1/YR: CPT | Mod: CPTII,S$GLB,, | Performed by: UROLOGY

## 2024-01-22 PROCEDURE — 1160F RVW MEDS BY RX/DR IN RCRD: CPT | Mod: CPTII,S$GLB,, | Performed by: UROLOGY

## 2024-01-22 PROCEDURE — 99214 OFFICE O/P EST MOD 30 MIN: CPT | Mod: S$GLB,,, | Performed by: UROLOGY

## 2024-01-22 PROCEDURE — 81003 URINALYSIS AUTO W/O SCOPE: CPT | Mod: QW,S$GLB,, | Performed by: UROLOGY

## 2024-01-22 PROCEDURE — 4010F ACE/ARB THERAPY RXD/TAKEN: CPT | Mod: CPTII,S$GLB,, | Performed by: UROLOGY

## 2024-01-22 PROCEDURE — 1126F AMNT PAIN NOTED NONE PRSNT: CPT | Mod: CPTII,S$GLB,, | Performed by: UROLOGY

## 2024-01-22 RX ORDER — TAMSULOSIN HYDROCHLORIDE 0.4 MG/1
0.4 CAPSULE ORAL DAILY
Qty: 30 CAPSULE | Refills: 5 | Status: SHIPPED | OUTPATIENT
Start: 2024-01-22 | End: 2025-01-21

## 2024-01-22 NOTE — PROGRESS NOTES
Chief Complaint:   Encounter Diagnoses   Name Primary?    Elevated PSA Yes    Benign prostatic hyperplasia with nocturia        HPI:  HPI Tae Nelson JR edmond 68 y.o. male who presents with follow up to elevated PSA.  He has had a negative biopsy in 2014.  At that time his PSA was 5.0.  His more recent PSA was 4.6.  Repeat prostate health index shows low risk of prostate cancer.  MRI prostate shows PI-RADS 2 with a 80 g gland.  He returns today for follow-up.  He does now complain of some nocturia and slow stream.    History:  Social History     Tobacco Use    Smoking status: Never    Smokeless tobacco: Never    Tobacco comments:     Smokes weed   Substance Use Topics    Alcohol use: Never    Drug use: Yes     Types: Marijuana     History reviewed. No pertinent past medical history.  History reviewed. No pertinent surgical history.  Family History   Problem Relation Age of Onset    Prostate cancer Father     Cancer Mother     No Known Problems Maternal Aunt     No Known Problems Maternal Uncle     No Known Problems Paternal Aunt     No Known Problems Paternal Uncle     No Known Problems Paternal Grandmother     No Known Problems Paternal Grandfather     No Known Problems Maternal Grandmother     No Known Problems Maternal Grandfather     No Known Problems Other        Current Outpatient Medications on File Prior to Visit   Medication Sig Dispense Refill    losartan (COZAAR) 100 MG tablet Take 1 tablet (100 mg total) by mouth every evening. 30 tablet 1     No current facility-administered medications on file prior to visit.        Objective:     Vitals:    01/22/24 1057   BP: 123/85   BP Location: Left arm   Patient Position: Sitting   Pulse: 76   Resp: 16   Temp: 98.5 °F (36.9 °C)   TempSrc: Oral   Weight: 84 kg (185 lb 1.2 oz)   Height: 6' (1.829 m)      BMI Readings from Last 1 Encounters:   01/22/24 25.10 kg/m²          Physical Exam  No acute distress alert and oriented  Respirations even unlabored   Abdomen  is soft nontender    MRI Prostate W W/O Contrast  Narrative: EXAMINATION:  MRI PROSTATE W W/O CONTRAST    CLINICAL HISTORY:  Prostate cancer suspected;  Elevated prostate specific antigen (PSA)    TECHNIQUE:  Multiparametric MR imaging of the prostate gland is performed with and without the intravenous administration of 8.5 cc of Gadavist.    COMPARISON:  None    FINDINGS:  The prostate gland measures 5.5 x 5.0 x 6.4 cm for total volume of 80.31 cc.  The T1 sequence shows no intra prostatic hemorrhage.    Hyperplastic nodules in the transitional zone.  No suspicious transitional zone lesion.    No diffusion restriction or T2 hypointense nodule in the peripheral zone.    Prostate capsule intact.  Seminal vesicles and neurovascular bundles are normal.  No pelvic lymphadenopathy.  No focal bone marrow replacing lesion in the bony pelvis.  Impression: 1. No focal suspicious lesion in the prostate gland to localize a clinically significant prostate cancer.  2. No pelvic lymphadenopathy.  3. Total prostate volume is 80.31 cc.  PIRADS 2 - Low: Clinically significant cancer is unlikely to be present.    Electronically signed by: Junior Carranza MD  Date:    01/19/2024  Time:    09:54     Lab Results   Component Value Date    PSA 4.6 (H) 12/12/2023        Lab Results   Component Value Date    CREATININE 0.8 01/12/2024      Assessment:       1. Elevated PSA    2. Benign prostatic hyperplasia with nocturia        Plan:     1. Elevated PSA    2. Benign prostatic hyperplasia with nocturia       Orders Placed This Encounter    PROSTATE SPECIFIC ANTIGEN, DIAGNOSTIC    POCT Urinalysis, Dipstick, Automated, W/O Scope    tamsulosin (FLOMAX) 0.4 mg Cap      BPH with elevated PSA.  We will start him on tamsulosin.  Discussed side effects.  Due to his prior negative biopsy, PI-RADS 2 on MRI and low prostate health index I suspect a low chance of prostate cancer.  I recommend observation.  We will repeat his PSA in 6 months.

## 2024-02-06 NOTE — TELEPHONE ENCOUNTER
No care due was identified.  [facility] Embedded Care Due Messages. Reference number: [id number].   2/06/2024 8:31:12 AM CST

## 2024-02-06 NOTE — TELEPHONE ENCOUNTER
Refill Routing Note   Medication(s) are not appropriate for processing by [facility] for the following reason(s):        New or recently adjusted medication    [facility] action(s):  Defer               Appointments  past 12m or future 3m with PCP    Date Provider   Last Visit   1/12/2024 Orion Santiago MD   Next Visit   2/9/2024 Oroin Santiago MD   ED visits in past 90 days: 0        Note composed:12:04 PM 02/06/2024

## 2024-02-08 RX ORDER — LOSARTAN POTASSIUM 100 MG/1
100 TABLET ORAL NIGHTLY
Qty: 90 TABLET | Refills: 3 | OUTPATIENT
Start: 2024-02-08

## 2024-02-20 ENCOUNTER — OFFICE VISIT (OUTPATIENT)
Dept: FAMILY MEDICINE | Facility: CLINIC | Age: 69
End: 2024-02-20
Payer: MEDICARE

## 2024-02-20 VITALS
WEIGHT: 187.25 LBS | RESPIRATION RATE: 19 BRPM | TEMPERATURE: 97 F | SYSTOLIC BLOOD PRESSURE: 122 MMHG | HEART RATE: 84 BPM | DIASTOLIC BLOOD PRESSURE: 78 MMHG | OXYGEN SATURATION: 97 % | BODY MASS INDEX: 25.36 KG/M2 | HEIGHT: 72 IN

## 2024-02-20 DIAGNOSIS — I10 PRIMARY HYPERTENSION: Primary | ICD-10-CM

## 2024-02-20 DIAGNOSIS — R97.20 ELEVATED PSA: ICD-10-CM

## 2024-02-20 PROCEDURE — 3288F FALL RISK ASSESSMENT DOCD: CPT | Mod: CPTII,S$GLB,, | Performed by: FAMILY MEDICINE

## 2024-02-20 PROCEDURE — 1159F MED LIST DOCD IN RCRD: CPT | Mod: CPTII,S$GLB,, | Performed by: FAMILY MEDICINE

## 2024-02-20 PROCEDURE — 99999 PR PBB SHADOW E&M-EST. PATIENT-LVL III: CPT | Mod: PBBFAC,,, | Performed by: FAMILY MEDICINE

## 2024-02-20 PROCEDURE — 3074F SYST BP LT 130 MM HG: CPT | Mod: CPTII,S$GLB,, | Performed by: FAMILY MEDICINE

## 2024-02-20 PROCEDURE — 3078F DIAST BP <80 MM HG: CPT | Mod: CPTII,S$GLB,, | Performed by: FAMILY MEDICINE

## 2024-02-20 PROCEDURE — 1101F PT FALLS ASSESS-DOCD LE1/YR: CPT | Mod: CPTII,S$GLB,, | Performed by: FAMILY MEDICINE

## 2024-02-20 PROCEDURE — 3008F BODY MASS INDEX DOCD: CPT | Mod: CPTII,S$GLB,, | Performed by: FAMILY MEDICINE

## 2024-02-20 PROCEDURE — 4010F ACE/ARB THERAPY RXD/TAKEN: CPT | Mod: CPTII,S$GLB,, | Performed by: FAMILY MEDICINE

## 2024-02-20 PROCEDURE — 1126F AMNT PAIN NOTED NONE PRSNT: CPT | Mod: CPTII,S$GLB,, | Performed by: FAMILY MEDICINE

## 2024-02-20 PROCEDURE — 99213 OFFICE O/P EST LOW 20 MIN: CPT | Mod: S$GLB,,, | Performed by: FAMILY MEDICINE

## 2024-02-20 RX ORDER — LOSARTAN POTASSIUM 100 MG/1
100 TABLET ORAL NIGHTLY
Qty: 90 TABLET | Refills: 1 | Status: SHIPPED | OUTPATIENT
Start: 2024-02-20 | End: 2025-02-19

## 2024-02-20 NOTE — PROGRESS NOTES
Subjective:       Patient ID: Tae Nelson JR is a 68 y.o. male.    Chief Complaint: Follow-up      HPI Comments:       Current Outpatient Medications:     tamsulosin (FLOMAX) 0.4 mg Cap, Take 1 capsule (0.4 mg total) by mouth once daily., Disp: 30 capsule, Rfl: 5    losartan (COZAAR) 100 MG tablet, Take 1 tablet (100 mg total) by mouth every evening., Disp: 90 tablet, Rfl: 1      One-month follow-up for blood pressure.  Increase losartan last time.  Doing well.  No adverse effects.  No blood pressure checks at home.      Talked him about vaccines today including Prevnar.  He declines most vaccines.    Saw Urology.  His father  of prostate cancer and he has had an elevated PSA.  MRI was done that was very reassuring.  Was put on Flomax.  This seems to be helping his urinary frequency and nocturia      Review of Systems   Constitutional:  Negative for activity change, appetite change and fever.   HENT:  Negative for sore throat.    Respiratory:  Negative for cough and shortness of breath.    Cardiovascular:  Negative for chest pain.   Gastrointestinal:  Negative for abdominal pain, diarrhea and nausea.   Genitourinary:  Negative for difficulty urinating.   Musculoskeletal:  Negative for arthralgias and myalgias.   Neurological:  Negative for dizziness and headaches.       Objective:      Vitals:    24 0906   BP: 122/78   Pulse: 84   Resp: 19   Temp: 97.1 °F (36.2 °C)   TempSrc: Tympanic   SpO2: 97%   Weight: 84.9 kg (187 lb 4.5 oz)   Height: 6' (1.829 m)   PainSc: 0-No pain     Physical Exam  Vitals and nursing note reviewed.   Constitutional:       General: He is not in acute distress.     Appearance: He is well-developed. He is not diaphoretic.   HENT:      Head: Normocephalic.   Neck:      Thyroid: No thyromegaly.   Cardiovascular:      Rate and Rhythm: Normal rate and regular rhythm.      Heart sounds: Normal heart sounds. No murmur heard.  Pulmonary:      Effort: Pulmonary effort is normal.       Breath sounds: Normal breath sounds. No wheezing or rales.   Abdominal:      General: There is no distension.      Palpations: Abdomen is soft.   Musculoskeletal:      Cervical back: Neck supple.   Lymphadenopathy:      Cervical: No cervical adenopathy.   Skin:     General: Skin is warm and dry.   Neurological:      Mental Status: He is alert and oriented to person, place, and time.   Psychiatric:         Mood and Affect: Mood normal.         Behavior: Behavior normal.         Thought Content: Thought content normal.         Judgment: Judgment normal.         Assessment:       1. Primary hypertension    2. Elevated PSA        Plan:   Primary hypertension  Comments:  Controlled.  Continue losartan 100 mg daily.  Follow-up 6 months    Elevated PSA  Comments:  No signs of cancer.  On Flomax with some improvement in symptoms    Other orders  -     losartan (COZAAR) 100 MG tablet; Take 1 tablet (100 mg total) by mouth every evening.  Dispense: 90 tablet; Refill: 1

## 2024-02-27 DIAGNOSIS — Z00.00 ENCOUNTER FOR MEDICARE ANNUAL WELLNESS EXAM: ICD-10-CM

## 2024-02-28 ENCOUNTER — HOSPITAL ENCOUNTER (EMERGENCY)
Facility: HOSPITAL | Age: 69
Discharge: HOME | End: 2024-02-28
Attending: EMERGENCY MEDICINE
Payer: MEDICARE

## 2024-02-28 ENCOUNTER — OFFICE VISIT (OUTPATIENT)
Dept: FAMILY MEDICINE | Facility: CLINIC | Age: 69
End: 2024-02-28
Payer: MEDICARE

## 2024-02-28 ENCOUNTER — TELEPHONE (OUTPATIENT)
Dept: FAMILY MEDICINE | Facility: CLINIC | Age: 69
End: 2024-02-28
Payer: MEDICARE

## 2024-02-28 VITALS
HEART RATE: 98 BPM | TEMPERATURE: 99 F | OXYGEN SATURATION: 97 % | BODY MASS INDEX: 25.53 KG/M2 | SYSTOLIC BLOOD PRESSURE: 118 MMHG | DIASTOLIC BLOOD PRESSURE: 78 MMHG | WEIGHT: 188.5 LBS | HEIGHT: 72 IN

## 2024-02-28 VITALS
TEMPERATURE: 98 F | DIASTOLIC BLOOD PRESSURE: 77 MMHG | WEIGHT: 188.5 LBS | BODY MASS INDEX: 25.56 KG/M2 | SYSTOLIC BLOOD PRESSURE: 129 MMHG | OXYGEN SATURATION: 98 % | RESPIRATION RATE: 18 BRPM | HEART RATE: 86 BPM

## 2024-02-28 DIAGNOSIS — M25.422 ELBOW SWELLING, LEFT: Primary | ICD-10-CM

## 2024-02-28 DIAGNOSIS — M70.22 OLECRANON BURSITIS OF LEFT ELBOW: Primary | ICD-10-CM

## 2024-02-28 DIAGNOSIS — M25.529 ELBOW PAIN: ICD-10-CM

## 2024-02-28 PROCEDURE — 99999 PR PBB SHADOW E&M-EST. PATIENT-LVL III: CPT | Mod: PBBFAC,,, | Performed by: STUDENT IN AN ORGANIZED HEALTH CARE EDUCATION/TRAINING PROGRAM

## 2024-02-28 PROCEDURE — 3078F DIAST BP <80 MM HG: CPT | Mod: CPTII,S$GLB,, | Performed by: STUDENT IN AN ORGANIZED HEALTH CARE EDUCATION/TRAINING PROGRAM

## 2024-02-28 PROCEDURE — 25000003 PHARM REV CODE 250: Performed by: NURSE PRACTITIONER

## 2024-02-28 PROCEDURE — 99214 OFFICE O/P EST MOD 30 MIN: CPT | Mod: S$GLB,,, | Performed by: STUDENT IN AN ORGANIZED HEALTH CARE EDUCATION/TRAINING PROGRAM

## 2024-02-28 PROCEDURE — 1101F PT FALLS ASSESS-DOCD LE1/YR: CPT | Mod: CPTII,S$GLB,, | Performed by: STUDENT IN AN ORGANIZED HEALTH CARE EDUCATION/TRAINING PROGRAM

## 2024-02-28 PROCEDURE — 1159F MED LIST DOCD IN RCRD: CPT | Mod: CPTII,S$GLB,, | Performed by: STUDENT IN AN ORGANIZED HEALTH CARE EDUCATION/TRAINING PROGRAM

## 2024-02-28 PROCEDURE — 3288F FALL RISK ASSESSMENT DOCD: CPT | Mod: CPTII,S$GLB,, | Performed by: STUDENT IN AN ORGANIZED HEALTH CARE EDUCATION/TRAINING PROGRAM

## 2024-02-28 PROCEDURE — 3074F SYST BP LT 130 MM HG: CPT | Mod: CPTII,S$GLB,, | Performed by: STUDENT IN AN ORGANIZED HEALTH CARE EDUCATION/TRAINING PROGRAM

## 2024-02-28 PROCEDURE — 3008F BODY MASS INDEX DOCD: CPT | Mod: CPTII,S$GLB,, | Performed by: STUDENT IN AN ORGANIZED HEALTH CARE EDUCATION/TRAINING PROGRAM

## 2024-02-28 PROCEDURE — 99284 EMERGENCY DEPT VISIT MOD MDM: CPT | Mod: 25

## 2024-02-28 PROCEDURE — 4010F ACE/ARB THERAPY RXD/TAKEN: CPT | Mod: CPTII,S$GLB,, | Performed by: STUDENT IN AN ORGANIZED HEALTH CARE EDUCATION/TRAINING PROGRAM

## 2024-02-28 PROCEDURE — 1125F AMNT PAIN NOTED PAIN PRSNT: CPT | Mod: CPTII,S$GLB,, | Performed by: STUDENT IN AN ORGANIZED HEALTH CARE EDUCATION/TRAINING PROGRAM

## 2024-02-28 RX ORDER — CEPHALEXIN 500 MG/1
500 CAPSULE ORAL 4 TIMES DAILY
Qty: 28 CAPSULE | Refills: 0 | Status: SHIPPED | OUTPATIENT
Start: 2024-02-28 | End: 2024-03-06

## 2024-02-28 RX ORDER — SULFAMETHOXAZOLE AND TRIMETHOPRIM 800; 160 MG/1; MG/1
1 TABLET ORAL
Status: COMPLETED | OUTPATIENT
Start: 2024-02-28 | End: 2024-02-28

## 2024-02-28 RX ORDER — CEPHALEXIN 500 MG/1
500 CAPSULE ORAL
Status: COMPLETED | OUTPATIENT
Start: 2024-02-28 | End: 2024-02-28

## 2024-02-28 RX ORDER — SULFAMETHOXAZOLE AND TRIMETHOPRIM 800; 160 MG/1; MG/1
1 TABLET ORAL 2 TIMES DAILY
Qty: 14 TABLET | Refills: 0 | Status: SHIPPED | OUTPATIENT
Start: 2024-02-28 | End: 2024-03-06

## 2024-02-28 RX ADMIN — CEPHALEXIN 500 MG: 500 CAPSULE ORAL at 10:02

## 2024-02-28 RX ADMIN — SULFAMETHOXAZOLE AND TRIMETHOPRIM 1 TABLET: 800; 160 TABLET ORAL at 10:02

## 2024-02-28 NOTE — ED PROVIDER NOTES
Encounter Date: 2/28/2024       History     Chief Complaint   Patient presents with    Joint Swelling     L elbow pain with redness, swelling, and warmth x 3 days with fever. Sent by pcp     68-year-old male with complaint left elbow pain, redness, and warmth for the past 3 days.  Patient reports that he rested his elbow on his bed the day before the elbow pain began.  Patient denies fever or chills.  Patient reports pain with range of motion left elbow.        Review of patient's allergies indicates:  No Known Allergies  No past medical history on file.  No past surgical history on file.  Family History   Problem Relation Age of Onset    Prostate cancer Father     Cancer Mother     No Known Problems Maternal Aunt     No Known Problems Maternal Uncle     No Known Problems Paternal Aunt     No Known Problems Paternal Uncle     No Known Problems Paternal Grandmother     No Known Problems Paternal Grandfather     No Known Problems Maternal Grandmother     No Known Problems Maternal Grandfather     No Known Problems Other      Social History     Tobacco Use    Smoking status: Never     Passive exposure: Never    Smokeless tobacco: Never    Tobacco comments:     Smokes weed   Substance Use Topics    Alcohol use: Never    Drug use: Yes     Types: Marijuana     Review of Systems   Constitutional:  Negative for fever.   HENT:  Negative for sore throat.    Respiratory:  Negative for shortness of breath.    Cardiovascular:  Negative for chest pain.   Gastrointestinal:  Negative for nausea.   Genitourinary:  Negative for dysuria.   Musculoskeletal:  Negative for back pain.        Left elbow pain   Skin:  Negative for rash.   Neurological:  Negative for weakness.   Hematological:  Does not bruise/bleed easily.       Physical Exam     Initial Vitals [02/28/24 0944]   BP Pulse Resp Temp SpO2   129/77 86 18 98.3 °F (36.8 °C) 98 %      MAP       --         Physical Exam    Nursing note and vitals reviewed.  Constitutional: He  appears well-developed and well-nourished.   HENT:   Head: Normocephalic and atraumatic.   Eyes: Conjunctivae are normal. Pupils are equal, round, and reactive to light.   Neck: Neck supple.   Normal range of motion.  Cardiovascular:  Normal rate, regular rhythm, normal heart sounds and intact distal pulses.           Pulmonary/Chest: Breath sounds normal.   Abdominal: Abdomen is soft. There is no rebound and no guarding.   Musculoskeletal:         General: Normal range of motion.      Cervical back: Normal range of motion and neck supple.      Comments: Mild erythema and swelling left olecranon, full range of motion left elbow with minimal difficulty     Neurological: He is alert.   Skin: Skin is warm and dry.   Psychiatric: He has a normal mood and affect. His behavior is normal. Thought content normal.         ED Course   Procedures  Labs Reviewed - No data to display       Imaging Results              X-Ray Elbow Complete Left (Final result)  Result time 02/28/24 10:29:06      Final result by Torres Damon III, MD (02/28/24 10:29:06)                   Impression:      See above      Electronically signed by: Torres Damon MD  Date:    02/28/2024  Time:    10:29               Narrative:    EXAMINATION:  XR ELBOW COMPLETE 3 VIEW LEFT    CLINICAL HISTORY:  Pain in unspecified elbow    FINDINGS:  Bone alignment is satisfactory.  No fracture or dislocation.  No significant arthritic change.  Soft tissue swelling in the posterior elbow suggesting olecranon bursitis.  No other significant soft tissue findings.                                    Imaging Results              X-Ray Elbow Complete Left (Final result)  Result time 02/28/24 10:29:06      Final result by Torres Damon III, MD (02/28/24 10:29:06)                   Impression:      See above      Electronically signed by: Torres Damon MD  Date:    02/28/2024  Time:    10:29               Narrative:    EXAMINATION:  XR ELBOW COMPLETE 3 VIEW  LEFT    CLINICAL HISTORY:  Pain in unspecified elbow    FINDINGS:  Bone alignment is satisfactory.  No fracture or dislocation.  No significant arthritic change.  Soft tissue swelling in the posterior elbow suggesting olecranon bursitis.  No other significant soft tissue findings.                                         Medications   sulfamethoxazole-trimethoprim 800-160mg per tablet 1 tablet (1 tablet Oral Given 2/28/24 1017)   cephALEXin capsule 500 mg (500 mg Oral Given 2/28/24 1017)     Medical Decision Making  Patient has clinically mild episode of olecranon bursitis.  Patient has very little swelling in the olecranon region, patient has full range of motion left elbow.  Do not clinically suspect that patient has septic joint.  Patient has a few mild abrasions proximal to the olecranon bursa.  Patient has no fever or chills.  Will treat with Ace bandage, antibiotics, and NSAIDs and patient instructed to return for worsening swelling or temperature greater than 100.4.    Amount and/or Complexity of Data Reviewed  Radiology: ordered.    Risk  Prescription drug management.                                      Clinical Impression:  Final diagnoses:  [M25.529] Elbow pain  [M70.22] Olecranon bursitis of left elbow (Primary)          ED Disposition Condition    Discharge Stable          ED Prescriptions       Medication Sig Dispense Start Date End Date Auth. Provider    sulfamethoxazole-trimethoprim 800-160mg (BACTRIM DS) 800-160 mg Tab Take 1 tablet by mouth 2 (two) times daily. for 7 days 14 tablet 2/28/2024 3/6/2024 Chan Nelson, KRISH    cephALEXin (KEFLEX) 500 MG capsule Take 1 capsule (500 mg total) by mouth 4 (four) times daily. for 7 days 28 capsule 2/28/2024 3/6/2024 Chan Nelson NP          Follow-up Information       Follow up With Specialties Details Why Contact Info    [facility]  Schedule an appointment as soon as possible for a visit in 2 days  [phone]             Chan Nelson,  NP  02/28/24 1038

## 2024-02-28 NOTE — TELEPHONE ENCOUNTER
----- Message from Veena Garcia sent at 2/28/2024  7:04 AM CST -----  Contact: wife [phone]  Patients wife called in this morning stating he has a swollen red left elbow/hot to the touch/breaking out with chills. She did not want the first available which was on tomorrow and wants to know can he be fit in today. Please call back [phone]

## 2024-02-28 NOTE — PROGRESS NOTES
CLINIC NOTE      Tae Nelson Jr. is a 68 y.o. year old White male with PMH as below. Pt presented to the clinic for   Chief Complaint   Patient presents with    Elbow Pain     Left elbow red hot and swollen, started Sunday at the camp. He leaned on it changing a water faucet . Began very painful , had chills this morning , feeling sluggish      Left elbow pain which started 3 days ago when he leaned on it has changing a water faucet.  It is red hot and swollen and he has also noticed subjective fever and chills this morning.  No insect bite or trauma.  Denies any history of gout    There are no problems to display for this patient.        No problems with medications.  ROS:Negative except above      Vitals:    02/28/24 0854   BP: 118/78   BP Location: Right arm   Patient Position: Sitting   BP Method: X-Large (Manual)   Pulse: 98   Temp: 98.7 °F (37.1 °C)   TempSrc: Tympanic   SpO2: 97%   Weight: 85.5 kg (188 lb 7.9 oz)   Height: 6' (1.829 m)         Body mass index is 25.56 kg/m².   Wt Readings from Last 5 Encounters:   02/28/24 85.5 kg (188 lb 7.9 oz)   02/28/24 85.5 kg (188 lb 7.9 oz)   02/20/24 84.9 kg (187 lb 4.5 oz)   01/22/24 84 kg (185 lb 1.2 oz)   01/12/24 84 kg (185 lb 1.2 oz)       History reviewed. No pertinent past medical history.    History reviewed. No pertinent surgical history.    Family History   Problem Relation Age of Onset    Prostate cancer Father     Cancer Mother     No Known Problems Maternal Aunt     No Known Problems Maternal Uncle     No Known Problems Paternal Aunt     No Known Problems Paternal Uncle     No Known Problems Paternal Grandmother     No Known Problems Paternal Grandfather     No Known Problems Maternal Grandmother     No Known Problems Maternal Grandfather     No Known Problems Other        Social History     Socioeconomic History    Marital status:    Tobacco Use    Smoking status: Never     Passive exposure: Never    Smokeless tobacco: Never    Tobacco  "comments:     Smokes weed   Substance and Sexual Activity    Alcohol use: Never    Drug use: Yes     Types: Marijuana    Sexual activity: Yes     Partners: Female       Current Outpatient Medications   Medication Sig Dispense Refill    losartan (COZAAR) 100 MG tablet Take 1 tablet (100 mg total) by mouth every evening. 90 tablet 1    tamsulosin (FLOMAX) 0.4 mg Cap Take 1 capsule (0.4 mg total) by mouth once daily. 30 capsule 5    cephALEXin (KEFLEX) 500 MG capsule Take 1 capsule (500 mg total) by mouth 4 (four) times daily. for 7 days 28 capsule 0    sulfamethoxazole-trimethoprim 800-160mg (BACTRIM DS) 800-160 mg Tab Take 1 tablet by mouth 2 (two) times daily. for 7 days 14 tablet 0     No current facility-administered medications for this visit.       Review of patient's allergies indicates:  No Known Allergies      PHYSICAL EXAM:  General - Well developed, alert and oriented in NAD  Left elbow:  Erythematous, swollen and increased warmth present at left elbow joint.  Tenderness to palpation present    Lab Results   Component Value Date    WBC 5.94 12/12/2023    HGB 16.9 12/12/2023    HCT 47.2 12/12/2023    MCV 93 12/12/2023    MCH 33.2 (H) 12/12/2023    MCHC 35.8 12/12/2023    RDW 11.9 12/12/2023     12/12/2023    MPV 9.9 12/12/2023       Lab Results   Component Value Date     01/12/2024    K 4.3 01/12/2024     01/12/2024    CO2 28 01/12/2024    GLU 87 01/12/2024    BUN 11 01/12/2024    CALCIUM 9.1 01/12/2024    PROT 8.0 12/12/2023    ALBUMIN 3.8 12/12/2023    BILITOT 0.7 12/12/2023    AST 19 12/12/2023    ALKPHOS 83 12/12/2023    ALT 16 12/12/2023       Lab Results   Component Value Date    TRIG 64 12/12/2023    CHOL 176 12/12/2023    HDL 45 12/12/2023    LDLCALC 118.2 12/12/2023    CHOLHDL 25.6 12/12/2023           Lab Results   Component Value Date    HGBA1C 5.4 12/12/2023       No results found for: "MICROALBUR", "[id number]"          Impression:  1. Elbow swelling, left           Patient has " increased swelling and warmth in the left elbow with systemic symptoms including subjective fever and chills.  Given age and comorbidities, there is concern for septic arthritis.  Encouraged patient to go to the ER to get stat imaging and labs.  Report given to the ER      No orders of the defined types were placed in this encounter.          No follow-ups on file.     I spent a total of 35 minutes on the day of the visit.This includes face to face time and non-face to face time preparing to see the patient (eg, review of tests), obtaining and/or reviewing separately obtained history, documenting clinical information in the electronic or other health record, independently interpreting results and communicating results to the patient/family/caregiver, or care coordinator.      This note is generated with speech recognition software and is subject to transcription error and sound alike phrases that may be missed by proofreading      Ritika London MD

## 2024-02-28 NOTE — TELEPHONE ENCOUNTER
Spoke with pt wife she stated she called back and was able to be scheduled at the other clinic and is at the office now

## 2024-06-20 ENCOUNTER — TELEPHONE (OUTPATIENT)
Dept: FAMILY MEDICINE | Facility: CLINIC | Age: 69
End: 2024-06-20
Payer: MEDICARE

## 2024-06-20 NOTE — TELEPHONE ENCOUNTER
----- Message from Michel Yung sent at 6/20/2024  3:30 PM CDT -----  Contact: [phone]  Patient needs first available appointment due to pre op for upcoming ortho surgery. Please call patient at [phone] . Thanks KB

## 2024-06-20 NOTE — TELEPHONE ENCOUNTER
Spoke to pt and pt's wife and she said pt has a preop appt scheduled for 06/25/24; explained to her that MD is going to need pt's preop paperwork when he comes and I gave her our fax number [phone] for her to send us there paperwork and I also told her she could just bring it in with pt when he comes to his appt and pt's wife TRAE.

## 2024-06-27 ENCOUNTER — OFFICE VISIT (OUTPATIENT)
Dept: FAMILY MEDICINE | Facility: CLINIC | Age: 69
End: 2024-06-27
Payer: MEDICARE

## 2024-06-27 VITALS
DIASTOLIC BLOOD PRESSURE: 82 MMHG | WEIGHT: 196.19 LBS | HEIGHT: 72 IN | OXYGEN SATURATION: 98 % | BODY MASS INDEX: 26.57 KG/M2 | TEMPERATURE: 98 F | SYSTOLIC BLOOD PRESSURE: 136 MMHG | HEART RATE: 61 BPM

## 2024-06-27 DIAGNOSIS — Z01.818 PREOP TESTING: Primary | ICD-10-CM

## 2024-06-27 DIAGNOSIS — I10 PRIMARY HYPERTENSION: ICD-10-CM

## 2024-06-27 DIAGNOSIS — M17.11 ARTHRITIS OF RIGHT KNEE: ICD-10-CM

## 2024-06-27 PROCEDURE — 1101F PT FALLS ASSESS-DOCD LE1/YR: CPT | Mod: CPTII,S$GLB,, | Performed by: FAMILY MEDICINE

## 2024-06-27 PROCEDURE — 1159F MED LIST DOCD IN RCRD: CPT | Mod: CPTII,S$GLB,, | Performed by: FAMILY MEDICINE

## 2024-06-27 PROCEDURE — 3075F SYST BP GE 130 - 139MM HG: CPT | Mod: CPTII,S$GLB,, | Performed by: FAMILY MEDICINE

## 2024-06-27 PROCEDURE — 4010F ACE/ARB THERAPY RXD/TAKEN: CPT | Mod: CPTII,S$GLB,, | Performed by: FAMILY MEDICINE

## 2024-06-27 PROCEDURE — 99214 OFFICE O/P EST MOD 30 MIN: CPT | Mod: S$GLB,,, | Performed by: FAMILY MEDICINE

## 2024-06-27 PROCEDURE — 99999 PR PBB SHADOW E&M-EST. PATIENT-LVL III: CPT | Mod: PBBFAC,,, | Performed by: FAMILY MEDICINE

## 2024-06-27 PROCEDURE — 3079F DIAST BP 80-89 MM HG: CPT | Mod: CPTII,S$GLB,, | Performed by: FAMILY MEDICINE

## 2024-06-27 PROCEDURE — 3288F FALL RISK ASSESSMENT DOCD: CPT | Mod: CPTII,S$GLB,, | Performed by: FAMILY MEDICINE

## 2024-06-27 PROCEDURE — 3008F BODY MASS INDEX DOCD: CPT | Mod: CPTII,S$GLB,, | Performed by: FAMILY MEDICINE

## 2024-06-27 PROCEDURE — 1125F AMNT PAIN NOTED PAIN PRSNT: CPT | Mod: CPTII,S$GLB,, | Performed by: FAMILY MEDICINE

## 2024-06-27 NOTE — PROGRESS NOTES
Subjective:       Patient ID: Tae Nelson Jr. is a 68 y.o. male.    Chief Complaint: Pre-op Exam      HPI Comments:       Current Outpatient Medications:     losartan (COZAAR) 100 MG tablet, Take 1 tablet (100 mg total) by mouth every evening., Disp: 90 tablet, Rfl: 1    tamsulosin (FLOMAX) 0.4 mg Cap, Take 1 capsule (0.4 mg total) by mouth once daily., Disp: 30 capsule, Rfl: 5      Here for preop clearance.      Dr. Villavicencio [facility].  07/15/2024.  Right TKR.  Preop testing to be done at [facility] and forwarded to me    Has been having pain with his right knee for about 4 years.    No cardiac or pulmonary problems.    Smoked only briefly in high school.  No alcohol.  Lives at home with his wife.  Retired.    Past medical history: Elevated PSA.  BPH Followed by urology.  Hypertension    Past surgical history: None    Medications: Losartan, Flomax      Review of Systems   Constitutional:  Negative for activity change, appetite change and fever.   HENT:  Negative for sore throat.    Respiratory:  Negative for cough and shortness of breath.    Cardiovascular:  Negative for chest pain.   Gastrointestinal:  Negative for abdominal pain, diarrhea and nausea.   Genitourinary:  Negative for difficulty urinating.   Musculoskeletal:  Positive for joint swelling. Negative for arthralgias and myalgias.   Neurological:  Negative for dizziness and headaches.       Objective:      Vitals:    06/27/24 0902   BP: 136/82   Pulse: 61   Temp: 98 °F (36.7 °C)   TempSrc: Oral   SpO2: 98%   Weight: 89 kg (196 lb 3.4 oz)   Height: 6' (1.829 m)   PainSc:   8   PainLoc: Knee     Physical Exam  Vitals and nursing note reviewed.   Constitutional:       General: He is not in acute distress.     Appearance: He is well-developed. He is not diaphoretic.   HENT:      Head: Normocephalic.      Mouth/Throat:      Pharynx: No oropharyngeal exudate.   Neck:      Thyroid: No thyromegaly.   Cardiovascular:      Rate and Rhythm: Normal rate and regular rhythm.       Heart sounds: Normal heart sounds. No murmur heard.  Pulmonary:      Effort: Pulmonary effort is normal.      Breath sounds: Normal breath sounds. No wheezing or rales.   Abdominal:      General: There is no distension.      Palpations: Abdomen is soft.      Tenderness: There is no abdominal tenderness.   Musculoskeletal:      Cervical back: Neck supple.      Right lower leg: No edema.      Left lower leg: No edema.   Lymphadenopathy:      Cervical: No cervical adenopathy.   Skin:     General: Skin is warm and dry.   Neurological:      Mental Status: He is alert and oriented to person, place, and time.   Psychiatric:         Mood and Affect: Mood normal.         Behavior: Behavior normal.         Thought Content: Thought content normal.         Judgment: Judgment normal.         Assessment:       1. Preop testing    2. Primary hypertension    3. Arthritis of right knee        Plan:   Preop testing    Primary hypertension  Comments:  Controlled    Arthritis of right knee  Comments:  Planned total knee replacement, mid July

## 2024-07-01 ENCOUNTER — HOSPITAL ENCOUNTER (INPATIENT)
Facility: HOSPITAL | Age: 69
LOS: 2 days | Discharge: HOME | DRG: 065 | End: 2024-07-03
Attending: STUDENT IN AN ORGANIZED HEALTH CARE EDUCATION/TRAINING PROGRAM | Admitting: INTERNAL MEDICINE
Payer: MEDICARE

## 2024-07-01 ENCOUNTER — OFFICE VISIT (OUTPATIENT)
Dept: FAMILY MEDICINE | Facility: CLINIC | Age: 69
End: 2024-07-01
Payer: MEDICARE

## 2024-07-01 VITALS
HEIGHT: 72 IN | WEIGHT: 190.94 LBS | BODY MASS INDEX: 25.86 KG/M2 | SYSTOLIC BLOOD PRESSURE: 90 MMHG | HEART RATE: 93 BPM | OXYGEN SATURATION: 97 % | DIASTOLIC BLOOD PRESSURE: 60 MMHG | TEMPERATURE: 97 F

## 2024-07-01 DIAGNOSIS — R29.898 LUE WEAKNESS: ICD-10-CM

## 2024-07-01 DIAGNOSIS — I10 PRIMARY HYPERTENSION: ICD-10-CM

## 2024-07-01 DIAGNOSIS — Z86.73 HISTORY OF RIGHT MCA STROKE: ICD-10-CM

## 2024-07-01 DIAGNOSIS — I63.9 ACUTE CVA (CEREBROVASCULAR ACCIDENT): Primary | ICD-10-CM

## 2024-07-01 DIAGNOSIS — R47.1 DYSARTHRIA: ICD-10-CM

## 2024-07-01 DIAGNOSIS — R09.89 SUSPECTED CEREBROVASCULAR ACCIDENT (CVA): ICD-10-CM

## 2024-07-01 DIAGNOSIS — R29.898 WEAKNESS OF LEFT LOWER EXTREMITY: ICD-10-CM

## 2024-07-01 DIAGNOSIS — M17.11 PRIMARY OSTEOARTHRITIS OF RIGHT KNEE: ICD-10-CM

## 2024-07-01 DIAGNOSIS — N40.0 BENIGN PROSTATIC HYPERPLASIA WITHOUT LOWER URINARY TRACT SYMPTOMS: ICD-10-CM

## 2024-07-01 DIAGNOSIS — I63.311 CEREBROVASCULAR ACCIDENT (CVA) DUE TO THROMBOSIS OF RIGHT MIDDLE CEREBRAL ARTERY: Primary | ICD-10-CM

## 2024-07-01 DIAGNOSIS — R29.818 ACUTE FOCAL NEUROLOGICAL DEFICIT: ICD-10-CM

## 2024-07-01 LAB
ALBUMIN SERPL BCP-MCNC: 3.9 G/DL (ref 3.5–5.2)
ALP SERPL-CCNC: 72 U/L (ref 55–135)
ALT SERPL W/O P-5'-P-CCNC: 18 U/L (ref 10–44)
ANION GAP SERPL CALC-SCNC: 11 MMOL/L (ref 8–16)
AST SERPL-CCNC: 18 U/L (ref 10–40)
BACTERIA #/AREA URNS HPF: ABNORMAL /HPF
BASOPHILS # BLD AUTO: 0.04 K/UL (ref 0–0.2)
BASOPHILS NFR BLD: 0.8 % (ref 0–1.9)
BILIRUB SERPL-MCNC: 0.7 MG/DL (ref 0.1–1)
BILIRUB UR QL STRIP: NEGATIVE
BUN SERPL-MCNC: 16 MG/DL (ref 8–23)
CALCIUM SERPL-MCNC: 9.6 MG/DL (ref 8.7–10.5)
CHLORIDE SERPL-SCNC: 104 MMOL/L (ref 95–110)
CHOLEST SERPL-MCNC: 158 MG/DL (ref 120–199)
CHOLEST/HDLC SERPL: 4.3 {RATIO} (ref 2–5)
CLARITY UR: CLEAR
CO2 SERPL-SCNC: 24 MMOL/L (ref 23–29)
COLOR UR: YELLOW
CREAT SERPL-MCNC: 1.3 MG/DL (ref 0.5–1.4)
DIFFERENTIAL METHOD BLD: ABNORMAL
EOSINOPHIL # BLD AUTO: 0.1 K/UL (ref 0–0.5)
EOSINOPHIL NFR BLD: 1.9 % (ref 0–8)
ERYTHROCYTE [DISTWIDTH] IN BLOOD BY AUTOMATED COUNT: 12.1 % (ref 11.5–14.5)
EST. GFR  (NO RACE VARIABLE): 59 ML/MIN/1.73 M^2
GLUCOSE SERPL-MCNC: 83 MG/DL (ref 70–110)
GLUCOSE UR QL STRIP: NEGATIVE
HCT VFR BLD AUTO: 45.7 % (ref 40–54)
HDLC SERPL-MCNC: 37 MG/DL (ref 40–75)
HDLC SERPL: 23.4 % (ref 20–50)
HGB BLD-MCNC: 15.7 G/DL (ref 14–18)
HGB UR QL STRIP: NEGATIVE
HIV 1+2 AB+HIV1 P24 AG SERPL QL IA: NEGATIVE
HYALINE CASTS #/AREA URNS LPF: 4 /LPF
IMM GRANULOCYTES # BLD AUTO: 0.02 K/UL (ref 0–0.04)
IMM GRANULOCYTES NFR BLD AUTO: 0.4 % (ref 0–0.5)
INR PPP: 0.9 (ref 0.8–1.2)
INR PPP: 0.9 (ref 0.8–1.2)
KETONES UR QL STRIP: NEGATIVE
LDLC SERPL CALC-MCNC: 104 MG/DL (ref 63–159)
LEUKOCYTE ESTERASE UR QL STRIP: ABNORMAL
LYMPHOCYTES # BLD AUTO: 1.1 K/UL (ref 1–4.8)
LYMPHOCYTES NFR BLD: 21.1 % (ref 18–48)
MCH RBC QN AUTO: 33.8 PG (ref 27–31)
MCHC RBC AUTO-ENTMCNC: 34.4 G/DL (ref 32–36)
MCV RBC AUTO: 98 FL (ref 82–98)
MICROSCOPIC COMMENT: ABNORMAL
MONOCYTES # BLD AUTO: 0.5 K/UL (ref 0.3–1)
MONOCYTES NFR BLD: 9.6 % (ref 4–15)
NEUTROPHILS # BLD AUTO: 3.5 K/UL (ref 1.8–7.7)
NEUTROPHILS NFR BLD: 66.2 % (ref 38–73)
NITRITE UR QL STRIP: POSITIVE
NONHDLC SERPL-MCNC: 121 MG/DL
NRBC BLD-RTO: 0 /100 WBC
OHS QRS DURATION: 86 MS
OHS QTC CALCULATION: 457 MS
PH UR STRIP: 6 [PH] (ref 5–8)
PLATELET # BLD AUTO: 192 K/UL (ref 150–450)
PMV BLD AUTO: 10.1 FL (ref 9.2–12.9)
POCT GLUCOSE: 92 MG/DL (ref 70–110)
POTASSIUM SERPL-SCNC: 4.1 MMOL/L (ref 3.5–5.1)
PROT SERPL-MCNC: 8.1 G/DL (ref 6–8.4)
PROT UR QL STRIP: ABNORMAL
PROTHROMBIN TIME: 10.5 SEC (ref 9–12.5)
PROTHROMBIN TIME: 11 SEC (ref 9–12.5)
RBC # BLD AUTO: 4.65 M/UL (ref 4.6–6.2)
RBC #/AREA URNS HPF: 6 /HPF (ref 0–4)
SODIUM SERPL-SCNC: 139 MMOL/L (ref 136–145)
SP GR UR STRIP: >1.03 (ref 1–1.03)
SQUAMOUS #/AREA URNS HPF: 0 /HPF
TRIGL SERPL-MCNC: 85 MG/DL (ref 30–150)
TROPONIN I SERPL DL<=0.01 NG/ML-MCNC: 0.01 NG/ML (ref 0–0.03)
TSH SERPL DL<=0.005 MIU/L-ACNC: 1.59 UIU/ML (ref 0.4–4)
URN SPEC COLLECT METH UR: ABNORMAL
UROBILINOGEN UR STRIP-ACNC: NEGATIVE EU/DL
WBC # BLD AUTO: 5.3 K/UL (ref 3.9–12.7)
WBC #/AREA URNS HPF: 60 /HPF (ref 0–5)

## 2024-07-01 PROCEDURE — 1159F MED LIST DOCD IN RCRD: CPT | Mod: CPTII,S$GLB,, | Performed by: FAMILY MEDICINE

## 2024-07-01 PROCEDURE — 81000 URINALYSIS NONAUTO W/SCOPE: CPT | Performed by: NURSE PRACTITIONER

## 2024-07-01 PROCEDURE — 1101F PT FALLS ASSESS-DOCD LE1/YR: CPT | Mod: CPTII,S$GLB,, | Performed by: FAMILY MEDICINE

## 2024-07-01 PROCEDURE — 83036 HEMOGLOBIN GLYCOSYLATED A1C: CPT | Performed by: NURSE PRACTITIONER

## 2024-07-01 PROCEDURE — 85610 PROTHROMBIN TIME: CPT | Performed by: NURSE PRACTITIONER

## 2024-07-01 PROCEDURE — 85610 PROTHROMBIN TIME: CPT | Mod: 91 | Performed by: STUDENT IN AN ORGANIZED HEALTH CARE EDUCATION/TRAINING PROGRAM

## 2024-07-01 PROCEDURE — 85025 COMPLETE CBC W/AUTO DIFF WBC: CPT | Performed by: NURSE PRACTITIONER

## 2024-07-01 PROCEDURE — 87186 SC STD MICRODIL/AGAR DIL: CPT | Performed by: NURSE PRACTITIONER

## 2024-07-01 PROCEDURE — 84443 ASSAY THYROID STIM HORMONE: CPT | Performed by: NURSE PRACTITIONER

## 2024-07-01 PROCEDURE — 87086 URINE CULTURE/COLONY COUNT: CPT | Performed by: NURSE PRACTITIONER

## 2024-07-01 PROCEDURE — 87389 HIV-1 AG W/HIV-1&-2 AB AG IA: CPT | Performed by: EMERGENCY MEDICINE

## 2024-07-01 PROCEDURE — 63600175 PHARM REV CODE 636 W HCPCS: Performed by: STUDENT IN AN ORGANIZED HEALTH CARE EDUCATION/TRAINING PROGRAM

## 2024-07-01 PROCEDURE — 80053 COMPREHEN METABOLIC PANEL: CPT | Performed by: NURSE PRACTITIONER

## 2024-07-01 PROCEDURE — 1126F AMNT PAIN NOTED NONE PRSNT: CPT | Mod: CPTII,S$GLB,, | Performed by: FAMILY MEDICINE

## 2024-07-01 PROCEDURE — 93005 ELECTROCARDIOGRAM TRACING: CPT

## 2024-07-01 PROCEDURE — 93010 ELECTROCARDIOGRAM REPORT: CPT | Mod: ,,, | Performed by: INTERNAL MEDICINE

## 2024-07-01 PROCEDURE — 80061 LIPID PANEL: CPT | Performed by: STUDENT IN AN ORGANIZED HEALTH CARE EDUCATION/TRAINING PROGRAM

## 2024-07-01 PROCEDURE — 87088 URINE BACTERIA CULTURE: CPT | Performed by: NURSE PRACTITIONER

## 2024-07-01 PROCEDURE — 99214 OFFICE O/P EST MOD 30 MIN: CPT | Mod: S$GLB,,, | Performed by: FAMILY MEDICINE

## 2024-07-01 PROCEDURE — 3078F DIAST BP <80 MM HG: CPT | Mod: CPTII,S$GLB,, | Performed by: FAMILY MEDICINE

## 2024-07-01 PROCEDURE — 3074F SYST BP LT 130 MM HG: CPT | Mod: CPTII,S$GLB,, | Performed by: FAMILY MEDICINE

## 2024-07-01 PROCEDURE — 63600175 PHARM REV CODE 636 W HCPCS: Performed by: NURSE PRACTITIONER

## 2024-07-01 PROCEDURE — 84484 ASSAY OF TROPONIN QUANT: CPT | Performed by: EMERGENCY MEDICINE

## 2024-07-01 PROCEDURE — 93010 ELECTROCARDIOGRAM REPORT: CPT | Mod: 76,,, | Performed by: INTERNAL MEDICINE

## 2024-07-01 PROCEDURE — 99999 PR PBB SHADOW E&M-EST. PATIENT-LVL III: CPT | Mod: PBBFAC,,, | Performed by: FAMILY MEDICINE

## 2024-07-01 PROCEDURE — 25000003 PHARM REV CODE 250: Performed by: STUDENT IN AN ORGANIZED HEALTH CARE EDUCATION/TRAINING PROGRAM

## 2024-07-01 PROCEDURE — 3288F FALL RISK ASSESSMENT DOCD: CPT | Mod: CPTII,S$GLB,, | Performed by: FAMILY MEDICINE

## 2024-07-01 PROCEDURE — 3008F BODY MASS INDEX DOCD: CPT | Mod: CPTII,S$GLB,, | Performed by: FAMILY MEDICINE

## 2024-07-01 PROCEDURE — 21400001 HC TELEMETRY ROOM

## 2024-07-01 PROCEDURE — 99285 EMERGENCY DEPT VISIT HI MDM: CPT | Mod: 25

## 2024-07-01 PROCEDURE — 82962 GLUCOSE BLOOD TEST: CPT

## 2024-07-01 PROCEDURE — 87077 CULTURE AEROBIC IDENTIFY: CPT | Performed by: NURSE PRACTITIONER

## 2024-07-01 PROCEDURE — 25500020 PHARM REV CODE 255: Performed by: STUDENT IN AN ORGANIZED HEALTH CARE EDUCATION/TRAINING PROGRAM

## 2024-07-01 PROCEDURE — 4010F ACE/ARB THERAPY RXD/TAKEN: CPT | Mod: CPTII,S$GLB,, | Performed by: FAMILY MEDICINE

## 2024-07-01 PROCEDURE — 96360 HYDRATION IV INFUSION INIT: CPT

## 2024-07-01 PROCEDURE — 36415 COLL VENOUS BLD VENIPUNCTURE: CPT | Performed by: NURSE PRACTITIONER

## 2024-07-01 RX ORDER — HYDROCODONE BITARTRATE AND ACETAMINOPHEN 5; 325 MG/1; MG/1
1 TABLET ORAL EVERY 6 HOURS PRN
Status: DISCONTINUED | OUTPATIENT
Start: 2024-07-01 | End: 2024-07-03 | Stop reason: HOSPADM

## 2024-07-01 RX ORDER — NAPROXEN SODIUM 220 MG/1
324 TABLET, FILM COATED ORAL
Status: COMPLETED | OUTPATIENT
Start: 2024-07-01 | End: 2024-07-01

## 2024-07-01 RX ORDER — CLOPIDOGREL BISULFATE 75 MG/1
75 TABLET ORAL DAILY
Status: DISCONTINUED | OUTPATIENT
Start: 2024-07-02 | End: 2024-07-03 | Stop reason: HOSPADM

## 2024-07-01 RX ORDER — ACETAMINOPHEN 325 MG/1
650 TABLET ORAL EVERY 6 HOURS PRN
Status: DISCONTINUED | OUTPATIENT
Start: 2024-07-01 | End: 2024-07-03 | Stop reason: HOSPADM

## 2024-07-01 RX ORDER — ENOXAPARIN SODIUM 100 MG/ML
40 INJECTION SUBCUTANEOUS EVERY 24 HOURS
Status: DISCONTINUED | OUTPATIENT
Start: 2024-07-01 | End: 2024-07-03 | Stop reason: HOSPADM

## 2024-07-01 RX ORDER — POLYETHYLENE GLYCOL 3350 17 G/17G
17 POWDER, FOR SOLUTION ORAL DAILY
Status: DISCONTINUED | OUTPATIENT
Start: 2024-07-02 | End: 2024-07-03 | Stop reason: HOSPADM

## 2024-07-01 RX ORDER — LABETALOL HYDROCHLORIDE 5 MG/ML
10 INJECTION, SOLUTION INTRAVENOUS
Status: DISCONTINUED | OUTPATIENT
Start: 2024-07-01 | End: 2024-07-02

## 2024-07-01 RX ORDER — CLOPIDOGREL BISULFATE 300 MG/1
300 TABLET, FILM COATED ORAL
Status: COMPLETED | OUTPATIENT
Start: 2024-07-01 | End: 2024-07-01

## 2024-07-01 RX ORDER — BISACODYL 10 MG/1
10 SUPPOSITORY RECTAL DAILY PRN
Status: DISCONTINUED | OUTPATIENT
Start: 2024-07-01 | End: 2024-07-03 | Stop reason: HOSPADM

## 2024-07-01 RX ORDER — ASPIRIN 81 MG/1
81 TABLET ORAL DAILY
Status: DISCONTINUED | OUTPATIENT
Start: 2024-07-02 | End: 2024-07-03 | Stop reason: HOSPADM

## 2024-07-01 RX ORDER — SODIUM CHLORIDE 0.9 % (FLUSH) 0.9 %
10 SYRINGE (ML) INJECTION
Status: DISCONTINUED | OUTPATIENT
Start: 2024-07-01 | End: 2024-07-03 | Stop reason: HOSPADM

## 2024-07-01 RX ORDER — ATORVASTATIN CALCIUM 40 MG/1
40 TABLET, FILM COATED ORAL DAILY
Status: DISCONTINUED | OUTPATIENT
Start: 2024-07-02 | End: 2024-07-03 | Stop reason: HOSPADM

## 2024-07-01 RX ADMIN — ENOXAPARIN SODIUM 40 MG: 40 INJECTION SUBCUTANEOUS at 09:07

## 2024-07-01 RX ADMIN — CLOPIDOGREL BISULFATE 300 MG: 300 TABLET, FILM COATED ORAL at 05:07

## 2024-07-01 RX ADMIN — IOHEXOL 100 ML: 350 INJECTION, SOLUTION INTRAVENOUS at 04:07

## 2024-07-01 RX ADMIN — ASPIRIN 81 MG CHEWABLE TABLET 324 MG: 81 TABLET CHEWABLE at 05:07

## 2024-07-01 RX ADMIN — SODIUM CHLORIDE, POTASSIUM CHLORIDE, SODIUM LACTATE AND CALCIUM CHLORIDE 1000 ML: 600; 310; 30; 20 INJECTION, SOLUTION INTRAVENOUS at 05:07

## 2024-07-01 NOTE — ASSESSMENT & PLAN NOTE
Patient presented with left hemiparesis, left sided numbness/tingling, left facial numbness, unsteady gait, and slurred speech    Antithrombotics for secondary stroke prevention: Antiplatelets: Aspirin: 81 mg daily  Aspirin: 325 mg daily  Clopidogrel: 300 mg loading dose x 1, now  Clopidogrel: 75 mg daily    Statins for secondary stroke prevention and hyperlipidemia, if present:   Statins: Atorvastatin- 40 mg daily    Aggressive risk factor modification: HTN, Diet, Exercise     Rehab efforts: The patient has been evaluated by a stroke team provider and the therapy needs have been fully considered based off the presenting complaints and exam findings. The following therapy evaluations are needed: PT evaluate and treat, OT evaluate and treat, SLP evaluate and treat    Diagnostics ordered/pending: CT scan of head without contrast to asses brain parenchyma, CTA Head to assess vasculature , CTA Neck/Arch to assess vasculature, Lipid Profile to assess cholesterol levels, MRI head without contrast to assess brain parenchyma, TTE to assess cardiac function/status , TSH to assess thyroid function    VTE prophylaxis: Enoxaparin 40 mg SQ every 24 hours    BP parameters: Infarct: No intervention, SBP <220    -tele-neurology consulted

## 2024-07-01 NOTE — ASSESSMENT & PLAN NOTE
-reports he has been taking ibuprofen for a few weeks d/t increased pain in his right knee  -was scheduled to have right TKA on 7/15/24 with Dr. Villavicencio, will need to postpone for now  -tylenol PRN pain

## 2024-07-01 NOTE — SUBJECTIVE & OBJECTIVE
Past Medical History:   Diagnosis Date    Arthritis     BPH (benign prostatic hyperplasia)     Hypertension        Past Surgical History:   Procedure Laterality Date    SHOULDER SURGERY Right        Review of patient's allergies indicates:  No Known Allergies    No current facility-administered medications on file prior to encounter.     Current Outpatient Medications on File Prior to Encounter   Medication Sig    losartan (COZAAR) 100 MG tablet Take 1 tablet (100 mg total) by mouth every evening.    tamsulosin (FLOMAX) 0.4 mg Cap Take 1 capsule (0.4 mg total) by mouth once daily.     Family History       Problem Relation (Age of Onset)    Cancer Mother    No Known Problems Maternal Aunt, Maternal Uncle, Paternal Aunt, Paternal Uncle, Paternal Grandmother, Paternal Grandfather, Maternal Grandmother, Maternal Grandfather, Other    Prostate cancer Father          Tobacco Use    Smoking status: Never     Passive exposure: Never    Smokeless tobacco: Never    Tobacco comments:     Smokes weed   Substance and Sexual Activity    Alcohol use: Never    Drug use: Yes     Types: Marijuana    Sexual activity: Yes     Partners: Female     Review of Systems   Constitutional:  Negative for chills, fatigue and fever.   HENT:  Negative for congestion.    Respiratory:  Negative for cough, shortness of breath and wheezing.    Cardiovascular:  Negative for leg swelling.   Gastrointestinal:  Negative for abdominal pain, constipation, diarrhea and nausea.   Neurological:  Positive for weakness. Negative for dizziness and facial asymmetry.   Psychiatric/Behavioral:  Negative for agitation.      Objective:     Vital Signs (Most Recent):  Temp: 98.2 °F (36.8 °C) (07/01/24 1503)  Pulse: (!) 56 (07/01/24 1730)  Resp: 18 (07/01/24 1730)  BP: (!) 154/82 (07/01/24 1730)  SpO2: 99 % (07/01/24 1730) Vital Signs (24h Range):  Temp:  [97 °F (36.1 °C)-98.2 °F (36.8 °C)] 98.2 °F (36.8 °C)  Pulse:  [52-97] 56  Resp:  [14-18] 18  SpO2:  [95 %-99 %] 99  %  BP: ()/(60-82) 154/82     Weight: 86.5 kg (190 lb 11.2 oz)  Body mass index is 25.86 kg/m².     Physical Exam  Vitals and nursing note reviewed.   Constitutional:       Appearance: Normal appearance.   HENT:      Head: Normocephalic.   Eyes:      Pupils: Pupils are equal, round, and reactive to light.   Cardiovascular:      Rate and Rhythm: Normal rate and regular rhythm.      Heart sounds: No murmur heard.  Pulmonary:      Effort: Pulmonary effort is normal.      Breath sounds: Normal breath sounds.   Abdominal:      General: Bowel sounds are normal.      Palpations: Abdomen is soft.   Musculoskeletal:         General: Normal range of motion.   Skin:     General: Skin is warm and dry.   Neurological:      General: No focal deficit present.      Mental Status: He is alert and oriented to person, place, and time.      Sensory: Sensory deficit (left side) present.      Motor: Weakness (left hemiparesis) present.      Gait: Gait abnormal.   Psychiatric:         Mood and Affect: Mood normal.         Behavior: Behavior normal.              CRANIAL NERVES     CN III, IV, VI   Pupils are equal, round, and reactive to light.       Significant Labs: All pertinent labs within the past 24 hours have been reviewed.  CBC:   Recent Labs   Lab 07/01/24  1537   WBC 5.30   HGB 15.7   HCT 45.7        CMP:   Recent Labs   Lab 07/01/24  1537      K 4.1      CO2 24   GLU 83   BUN 16   CREATININE 1.3   CALCIUM 9.6   PROT 8.1   ALBUMIN 3.9   BILITOT 0.7   ALKPHOS 72   AST 18   ALT 18   ANIONGAP 11     TSH:   Recent Labs   Lab 07/01/24  1537   TSH 1.588       Significant Imaging: I have reviewed all pertinent imaging results/findings within the past 24 hours.

## 2024-07-01 NOTE — ED PROVIDER NOTES
SCRIBE #1 NOTE: I, Ashu Fernandes, am scribing for, and in the presence of, Bear Sorenson MD. I have scribed the entire note.       History     Chief Complaint   Patient presents with    Cerebrovascular Accident     C/o various left side neglect and changes starting Saturday      Review of patient's allergies indicates:  No Known Allergies      History of Present Illness     HPI    7/1/2024, 3:48 PM  History obtained from the patient      History of Present Illness: Tae Nelson Jr. is a 68 y.o. male patient who presents to the Emergency Department for evaluation of CVA with left sided numbness/weakness which onset gradually 2 days ago. He has left arm/hand, facial numbness which has continued to worsen. His most recent change in sxs was today around 10 am when he started having slurred speech, since most recent change he states sxs have been stable since. Symptoms are constant and moderate in severity. No mitigating or exacerbating factors reported. Patient denies any abd pain, CP, and all other sxs at this time. No further complaints or concerns at this time.       Arrival mode: Personal vehicle     PCP: Orion Santiago MD        Past Medical History:  Past Medical History:   Diagnosis Date    Arthritis     BPH (benign prostatic hyperplasia)     Hypertension        Past Surgical History:  Past Surgical History:   Procedure Laterality Date    SHOULDER SURGERY Right          Family History:  Family History   Problem Relation Name Age of Onset    Prostate cancer Father      Cancer Mother      No Known Problems Maternal Aunt      No Known Problems Maternal Uncle      No Known Problems Paternal Aunt      No Known Problems Paternal Uncle      No Known Problems Paternal Grandmother      No Known Problems Paternal Grandfather      No Known Problems Maternal Grandmother      No Known Problems Maternal Grandfather      No Known Problems Other         Social History:  Social History     Tobacco Use    Smoking  status: Never     Passive exposure: Never    Smokeless tobacco: Never    Tobacco comments:     Smokes weed   Substance and Sexual Activity    Alcohol use: Never    Drug use: Yes     Types: Marijuana    Sexual activity: Yes     Partners: Female        Review of Systems     Review of Systems   Constitutional:  Negative for fever.   HENT:  Negative for sore throat.    Respiratory:  Negative for shortness of breath.    Cardiovascular:  Negative for chest pain.   Gastrointestinal:  Negative for abdominal pain and nausea.   Genitourinary:  Negative for dysuria.   Musculoskeletal:  Negative for back pain.   Skin:  Negative for rash.   Neurological:  Positive for speech difficulty, weakness (L side) and numbness (L side).   Hematological:  Does not bruise/bleed easily.        Physical Exam     Initial Vitals [07/01/24 1503]   BP Pulse Resp Temp SpO2   108/66 97 18 98.2 °F (36.8 °C) 97 %      MAP       --          Physical Exam  Nursing Notes and Vital Signs Reviewed.  Constitutional: Patient is in no acute distress. Well-developed and well-nourished.  Head: Atraumatic. Normocephalic.  Eyes: PERRL. EOM intact. Conjunctivae are not pale. No scleral icterus.  ENT: Mucous membranes are moist. Oropharynx is clear and symmetric.    Neck: Supple. Full ROM. No lymphadenopathy.  Cardiovascular: Regular rate. Regular rhythm. No murmurs, rubs, or gallops. Distal pulses are 2+ and symmetric.  Pulmonary/Chest: No respiratory distress. Clear to auscultation bilaterally. No wheezing or rales.  Abdominal: Soft and non-distended.  There is no tenderness.  No rebound, guarding, or rigidity.   Musculoskeletal: Moves all extremities. No obvious deformities. No edema. No calf tenderness.    Skin: Warm and dry.  Neurological:  Alert, awake, and appropriate.  Patient with dysarthria present, no aphasia.  Patient has pronator drift in the left upper extremity.  He has weakness to the left lower extremity compared to the right lower  extremity.  Psychiatric: Normal affect. Good eye contact. Appropriate in content.     ED Course   Critical Care    Date/Time: 7/1/2024 4:38 PM    Performed by: Bear Sorenson MD  Authorized by: Bear Sorenson MD  Direct patient critical care time: 17 minutes  Additional history critical care time: 8 minutes  Ordering / reviewing critical care time: 7 minutes  Documentation critical care time: 7 minutes  Consulting other physicians critical care time: 8 minutes  Total critical care time (exclusive of procedural time) : 47 minutes  Critical care time was exclusive of separately billable procedures and treating other patients and teaching time.  Critical care was necessary to treat or prevent imminent or life-threatening deterioration of the following conditions: CVA with consideration of TPA.  Critical care was time spent personally by me on the following activities: blood draw for specimens, development of treatment plan with patient or surrogate, interpretation of cardiac output measurements, evaluation of patient's response to treatment, examination of patient, obtaining history from patient or surrogate, ordering and performing treatments and interventions, ordering and review of laboratory studies, ordering and review of radiographic studies, pulse oximetry, review of old charts and re-evaluation of patient's condition.        ED Vital Signs:  Vitals:    07/02/24 0401 07/02/24 0719 07/02/24 0842 07/02/24 1215   BP:  (!) 167/95 (!) 167/95 (!) 160/108   Pulse: 60 69 76 61   Resp:  18  18   Temp:  97.6 °F (36.4 °C)  97.4 °F (36.3 °C)   TempSrc:       SpO2:  95%  97%   Weight:   86.2 kg (190 lb)    Height:   6' (1.829 m)     07/02/24 1426 07/02/24 1546 07/02/24 1649 07/02/24 1957   BP:  (!) 174/105 125/79 (!) 179/92   Pulse: 70 71  60   Resp:  18  16   Temp:  97.6 °F (36.4 °C)  96.6 °F (35.9 °C)   TempSrc:    Oral   SpO2:  97%  96%   Weight:       Height:        07/02/24 2000 07/02/24 2016 07/02/24 8119  07/03/24 0349   BP:  (!) 179/92 125/82    Pulse: 60  63 (!) 50   Resp:  17 16    Temp:   98.2 °F (36.8 °C)    TempSrc:   Oral    SpO2:   (!) 93%    Weight:       Height:        07/03/24 0418 07/03/24 0754 07/03/24 0837   BP: 123/67 136/68    Pulse: (!) 54 (!) 52 65   Resp: 16 18    Temp: 98 °F (36.7 °C) 97.9 °F (36.6 °C)    TempSrc: Oral Oral    SpO2: 95% 97%    Weight:      Height:          Abnormal Lab Results:  Labs Reviewed   CBC W/ AUTO DIFFERENTIAL - Abnormal; Notable for the following components:       Result Value    MCH 33.8 (*)     All other components within normal limits    Narrative:     Release to patient->Immediate   COMPREHENSIVE METABOLIC PANEL - Abnormal; Notable for the following components:    eGFR 59 (*)     All other components within normal limits    Narrative:     Release to patient->Immediate   URINALYSIS, REFLEX TO URINE CULTURE - Abnormal; Notable for the following components:    Specific Gravity, UA >1.030 (*)     Protein, UA Trace (*)     Nitrite, UA Positive (*)     Leukocytes, UA 2+ (*)     All other components within normal limits    Narrative:     Specimen Source->Urine   URINALYSIS MICROSCOPIC - Abnormal; Notable for the following components:    RBC, UA 6 (*)     WBC, UA 60 (*)     Bacteria Few (*)     Hyaline Casts, UA 4 (*)     All other components within normal limits    Narrative:     Specimen Source->Urine   HIV 1 / 2 ANTIBODY    Narrative:     Release to patient->Immediate   PROTIME-INR    Narrative:     Release to patient->Immediate   TSH    Narrative:     Release to patient->Immediate   PROTIME-INR   TROPONIN I   TROPONIN I    Narrative:     Release to patient->Immediate   POCT GLUCOSE        All Lab Results:  Results for orders placed or performed during the hospital encounter of 07/01/24   Urine culture    Specimen: Urine   Result Value Ref Range    Urine Culture, Routine ESCHERICHIA COLI  >100,000 cfu/ml   (A)        Susceptibility    Escherichia coli - CULTURE, URINE      Amp/Sulbactam >16/8 Resistant mcg/mL     Ampicillin >16 Resistant mcg/mL     Amox/K Clav'ate >16/8 Resistant mcg/mL     Ceftriaxone <=1 Sensitive mcg/mL     Cefazolin 16 Intermediate mcg/mL     Ciprofloxacin <=1 Sensitive mcg/mL     Cefepime <=2 Sensitive mcg/mL     Ertapenem <=0.5 Sensitive mcg/mL     Nitrofurantoin <=32 Sensitive mcg/mL     Gentamicin <=4 Sensitive mcg/mL     Levofloxacin <=2 Sensitive mcg/mL     Meropenem <=1 Sensitive mcg/mL     Piperacillin/Tazo <=16 Sensitive mcg/mL     Trimeth/Sulfa <=2/38 Sensitive mcg/mL     Tobramycin <=4 Sensitive mcg/mL   HIV 1/2 Ag/Ab (4th Gen)   Result Value Ref Range    HIV 1/2 Ag/Ab Negative Negative   CBC W/ AUTO DIFFERENTIAL   Result Value Ref Range    WBC 5.30 3.90 - 12.70 K/uL    RBC 4.65 4.60 - 6.20 M/uL    Hemoglobin 15.7 14.0 - 18.0 g/dL    Hematocrit 45.7 40.0 - 54.0 %    MCV 98 82 - 98 fL    MCH 33.8 (H) 27.0 - 31.0 pg    MCHC 34.4 32.0 - 36.0 g/dL    RDW 12.1 11.5 - 14.5 %    Platelets 192 150 - 450 K/uL    MPV 10.1 9.2 - 12.9 fL    Immature Granulocytes 0.4 0.0 - 0.5 %    Gran # (ANC) 3.5 1.8 - 7.7 K/uL    Immature Grans (Abs) 0.02 0.00 - 0.04 K/uL    Lymph # 1.1 1.0 - 4.8 K/uL    Mono # 0.5 0.3 - 1.0 K/uL    Eos # 0.1 0.0 - 0.5 K/uL    Baso # 0.04 0.00 - 0.20 K/uL    nRBC 0 0 /100 WBC    Gran % 66.2 38.0 - 73.0 %    Lymph % 21.1 18.0 - 48.0 %    Mono % 9.6 4.0 - 15.0 %    Eosinophil % 1.9 0.0 - 8.0 %    Basophil % 0.8 0.0 - 1.9 %    Differential Method Automated    Comprehensive metabolic panel   Result Value Ref Range    Sodium 139 136 - 145 mmol/L    Potassium 4.1 3.5 - 5.1 mmol/L    Chloride 104 95 - 110 mmol/L    CO2 24 23 - 29 mmol/L    Glucose 83 70 - 110 mg/dL    BUN 16 8 - 23 mg/dL    Creatinine 1.3 0.5 - 1.4 mg/dL    Calcium 9.6 8.7 - 10.5 mg/dL    Total Protein 8.1 6.0 - 8.4 g/dL    Albumin 3.9 3.5 - 5.2 g/dL    Total Bilirubin 0.7 0.1 - 1.0 mg/dL    Alkaline Phosphatase 72 55 - 135 U/L    AST 18 10 - 40 U/L    ALT 18 10 - 44 U/L    eGFR 59 (A)  >60 mL/min/1.73 m^2    Anion Gap 11 8 - 16 mmol/L   Protime-INR   Result Value Ref Range    Prothrombin Time 10.5 9.0 - 12.5 sec    INR 0.9 0.8 - 1.2   TSH   Result Value Ref Range    TSH 1.588 0.400 - 4.000 uIU/mL   Protime-INR   Result Value Ref Range    Prothrombin Time 11.0 9.0 - 12.5 sec    INR 0.9 0.8 - 1.2   LDL - Lipid Panel   Result Value Ref Range    Cholesterol 158 120 - 199 mg/dL    Triglycerides 85 30 - 150 mg/dL    HDL 37 (L) 40 - 75 mg/dL    LDL Cholesterol 104.0 63.0 - 159.0 mg/dL    HDL/Cholesterol Ratio 23.4 20.0 - 50.0 %    Total Cholesterol/HDL Ratio 4.3 2.0 - 5.0    Non-HDL Cholesterol 121 mg/dL   Urinalysis, Reflex to Urine Culture Urine, Clean Catch    Specimen: Urine   Result Value Ref Range    Specimen UA Urine, Clean Catch     Color, UA Yellow Yellow, Straw, Radha    Appearance, UA Clear Clear    pH, UA 6.0 5.0 - 8.0    Specific Gravity, UA >1.030 (A) 1.005 - 1.030    Protein, UA Trace (A) Negative    Glucose, UA Negative Negative    Ketones, UA Negative Negative    Bilirubin (UA) Negative Negative    Occult Blood UA Negative Negative    Nitrite, UA Positive (A) Negative    Urobilinogen, UA Negative <2.0 EU/dL    Leukocytes, UA 2+ (A) Negative   Hemoglobin A1c   Result Value Ref Range    Hemoglobin A1C 5.4 4.0 - 5.6 %    Estimated Avg Glucose 108 68 - 131 mg/dL   Troponin I   Result Value Ref Range    Troponin I 0.006 0.000 - 0.026 ng/mL   Urinalysis Microscopic   Result Value Ref Range    RBC, UA 6 (H) 0 - 4 /hpf    WBC, UA 60 (H) 0 - 5 /hpf    Bacteria Few (A) None-Occ /hpf    Squam Epithel, UA 0 /hpf    Hyaline Casts, UA 4 (A) 0-1/lpf /lpf    Microscopic Comment SEE COMMENT    Comprehensive metabolic panel   Result Value Ref Range    Sodium 138 136 - 145 mmol/L    Potassium 3.8 3.5 - 5.1 mmol/L    Chloride 106 95 - 110 mmol/L    CO2 22 (L) 23 - 29 mmol/L    Glucose 81 70 - 110 mg/dL    BUN 13 8 - 23 mg/dL    Creatinine 1.1 0.5 - 1.4 mg/dL    Calcium 8.9 8.7 - 10.5 mg/dL    Total Protein  7.0 6.0 - 8.4 g/dL    Albumin 3.5 3.5 - 5.2 g/dL    Total Bilirubin 0.5 0.1 - 1.0 mg/dL    Alkaline Phosphatase 65 55 - 135 U/L    AST 16 10 - 40 U/L    ALT 16 10 - 44 U/L    eGFR >60 >60 mL/min/1.73 m^2    Anion Gap 10 8 - 16 mmol/L   Magnesium   Result Value Ref Range    Magnesium 1.9 1.6 - 2.6 mg/dL   Phosphorus   Result Value Ref Range    Phosphorus 3.8 2.7 - 4.5 mg/dL   CBC auto differential   Result Value Ref Range    WBC 4.92 3.90 - 12.70 K/uL    RBC 4.23 (L) 4.60 - 6.20 M/uL    Hemoglobin 14.3 14.0 - 18.0 g/dL    Hematocrit 42.6 40.0 - 54.0 %     (H) 82 - 98 fL    MCH 33.8 (H) 27.0 - 31.0 pg    MCHC 33.6 32.0 - 36.0 g/dL    RDW 12.1 11.5 - 14.5 %    Platelets 172 150 - 450 K/uL    MPV 10.2 9.2 - 12.9 fL    Immature Granulocytes 0.6 (H) 0.0 - 0.5 %    Gran # (ANC) 2.5 1.8 - 7.7 K/uL    Immature Grans (Abs) 0.03 0.00 - 0.04 K/uL    Lymph # 1.6 1.0 - 4.8 K/uL    Mono # 0.5 0.3 - 1.0 K/uL    Eos # 0.2 0.0 - 0.5 K/uL    Baso # 0.06 0.00 - 0.20 K/uL    nRBC 0 0 /100 WBC    Gran % 51.4 38.0 - 73.0 %    Lymph % 32.7 18.0 - 48.0 %    Mono % 10.8 4.0 - 15.0 %    Eosinophil % 3.3 0.0 - 8.0 %    Basophil % 1.2 0.0 - 1.9 %    Differential Method Automated    Troponin I   Result Value Ref Range    Troponin I 0.006 0.000 - 0.026 ng/mL   APTT   Result Value Ref Range    aPTT 32.4 (H) 21.0 - 32.0 sec   Protime-INR   Result Value Ref Range    Prothrombin Time 11.0 9.0 - 12.5 sec    INR 0.9 0.8 - 1.2   Lipid panel   Result Value Ref Range    Cholesterol 163 120 - 199 mg/dL    Triglycerides 69 30 - 150 mg/dL    HDL 38 (L) 40 - 75 mg/dL    LDL Cholesterol 111.2 63.0 - 159.0 mg/dL    HDL/Cholesterol Ratio 23.3 20.0 - 50.0 %    Total Cholesterol/HDL Ratio 4.3 2.0 - 5.0    Non-HDL Cholesterol 125 mg/dL   Comprehensive metabolic panel   Result Value Ref Range    Sodium 138 136 - 145 mmol/L    Potassium 3.6 3.5 - 5.1 mmol/L    Chloride 106 95 - 110 mmol/L    CO2 22 (L) 23 - 29 mmol/L    Glucose 82 70 - 110 mg/dL    BUN 12 8 -  23 mg/dL    Creatinine 1.0 0.5 - 1.4 mg/dL    Calcium 8.8 8.7 - 10.5 mg/dL    Total Protein 7.0 6.0 - 8.4 g/dL    Albumin 3.5 3.5 - 5.2 g/dL    Total Bilirubin 0.5 0.1 - 1.0 mg/dL    Alkaline Phosphatase 67 55 - 135 U/L    AST 17 10 - 40 U/L    ALT 17 10 - 44 U/L    eGFR >60 >60 mL/min/1.73 m^2    Anion Gap 10 8 - 16 mmol/L   CBC auto differential   Result Value Ref Range    WBC 4.32 3.90 - 12.70 K/uL    RBC 4.34 (L) 4.60 - 6.20 M/uL    Hemoglobin 14.6 14.0 - 18.0 g/dL    Hematocrit 42.6 40.0 - 54.0 %    MCV 98 82 - 98 fL    MCH 33.6 (H) 27.0 - 31.0 pg    MCHC 34.3 32.0 - 36.0 g/dL    RDW 11.9 11.5 - 14.5 %    Platelets 175 150 - 450 K/uL    MPV 10.1 9.2 - 12.9 fL    Immature Granulocytes 0.2 0.0 - 0.5 %    Gran # (ANC) 2.0 1.8 - 7.7 K/uL    Immature Grans (Abs) 0.01 0.00 - 0.04 K/uL    Lymph # 1.7 1.0 - 4.8 K/uL    Mono # 0.4 0.3 - 1.0 K/uL    Eos # 0.2 0.0 - 0.5 K/uL    Baso # 0.04 0.00 - 0.20 K/uL    nRBC 0 0 /100 WBC    Gran % 46.4 38.0 - 73.0 %    Lymph % 38.9 18.0 - 48.0 %    Mono % 9.7 4.0 - 15.0 %    Eosinophil % 3.9 0.0 - 8.0 %    Basophil % 0.9 0.0 - 1.9 %    Differential Method Automated    ECG 12 lead   Result Value Ref Range    QRS Duration 86 ms    [facility]HS QTC Calculation 457 ms   ECG 12 lead   Result Value Ref Range    QRS Duration 94 ms    [facility]HS QTC Calculation 424 ms   Echo Saline Bubble? Yes   Result Value Ref Range    BSA 2.09 m2    LA WIDTH 3.6 cm    RA Width 3.4 cm    LVOT stroke volume 73.70 cm3    LVIDd 4.09 3.5 - 6.0 cm    LV Systolic Volume 30.83 mL    LV Systolic Volume Index 14.8 mL/m2    LVIDs 2.85 2.1 - 4.0 cm    LV Diastolic Volume 74.00 mL    LV Diastolic Volume Index 35.58 mL/m2    Left Ventricular End Systolic Volume by Teichholz Method 30.83 mL    Left Ventricular End Diastolic Volume by Teichholz Method 74.00 mL    IVS 1.42 (A) 0.6 - 1.1 cm    LVOT diameter 2.08 cm    LVOT area 3.4 cm2    FS 30 28 - 44 %    Left Ventricle Relative Wall Thickness 0.68 cm    Posterior Wall 1.39 (A) 0.6 -  1.1 cm    LV mass 217.00 g    LV Mass Index 104 g/m2    MV Peak E Esequiel 0.74 m/s    TDI LATERAL 0.12 m/s    TDI SEPTAL 0.09 m/s    E/E' ratio 7.05 m/s    MV Peak A Esequiel 0.82 m/s    TR Max Esequiel 1.73 m/s    E/A ratio 0.90     IVRT 95.15 msec    E wave deceleration time 296.70 msec    LV SEPTAL E/E' RATIO 8.22 m/s    LA Volume Index 24.8 mL/m2    LV LATERAL E/E' RATIO 6.17 m/s    LA volume 51.59 cm3    LVOT peak esequiel 0.92 m/s    Left Ventricular Outflow Tract Mean Velocity 0.76 cm/s    Left Ventricular Outflow Tract Mean Gradient 2.38 mmHg    RVOT peak VTI 15.0 cm    TAPSE 2.18 cm    LA size 3.42 cm    Left Atrium Minor Axis 4.89 cm    Left Atrium Major Axis 4.97 cm    RA Major Axis 4.32 cm    AV mean gradient 3 mmHg    AV peak gradient 6 mmHg    Ao peak esequiel 1.22 m/s    Ao VTI 28.70 cm    LVOT peak VTI 21.70 cm    AV valve area 2.57 cm²    AV Velocity Ratio 0.75     AV index (prosthetic) 0.76     SAM by Velocity Ratio 2.56 cm²    MV stenosis pressure 1/2 time 86.04 ms    MV valve area p 1/2 method 2.56 cm2    Triscuspid Valve Regurgitation Peak Gradient 12 mmHg    PV mean gradient 1 mmHg    RVOT peak esequiel 0.66 m/s    Ao root annulus 3.58 cm    STJ 3.94 cm    Ascending aorta 3.81 cm    IVC diameter 1.79 cm    Mean e' 0.11 m/s    ZLVIDS -2.47     ZLVIDD -4.43     TV resting pulmonary artery pressure 15 mmHg    RV TB RVSP 5 mmHg    Est. RA pres 3 mmHg    PV peak gradient 2    POCT glucose   Result Value Ref Range    POCT Glucose 92 70 - 110 mg/dL         Imaging Results:  Imaging Results              MRI Brain Without Contrast (Final result)  Result time 07/01/24 22:32:57      Final result by Jose Juan Schwartz MD (07/01/24 22:32:57)                   Impression:      As above      Electronically signed by: Jose Juan Schwartz  Date:    07/01/2024  Time:    22:32               Narrative:    EXAMINATION:  MRI BRAIN WITHOUT CONTRAST    CLINICAL HISTORY:  Stroke, follow up;    TECHNIQUE:  Sagittal T1. Axial T1, T2, T2 FLAIR, GRE, DWI.  Coronal T2 FLAIR.    COMPARISON:  None available.    FINDINGS:  A focal restricted diffusion in the right periventricular region posteriorly consistent with small acute lacunar infarct.  No evidence for other infarct.  Moderate subcortical and periventricular FLAIR hyperintensities likely related to chronic microvascular ischemic changes.  No evidence for sinusitis.  Midline structures are symmetric.  No evidence for sinusitis.  No evidence for hemorrhage.                                       CTA Head and Neck (xpd) (Final result)  Result time 07/01/24 17:04:35      Final result by Josef Early MD (07/01/24 17:04:35)                   Impression:      Moderate to severe stenosis along the proximal M2 segment of the right MCA.    Mild origin stenosis of the left vertebral artery.    Small intimal flap in the proximal right subclavian artery.    No other significant stenosis, occlusion, aneurysm, or vascular malformation seen in the head or neck.    No new evidence of acute intracranial abnormality or abnormal enhancement.    Nonspecific white matter changes likely related to chronic microvascular ischemia.  Remote appearing right thalamic lacunar infarct again noted.    Findings relayed to Dr. Sorenson via [facility] secure chat at 17:04 on 07/01/2024.      Electronically signed by: Josef Early  Date:    07/01/2024  Time:    17:04               Narrative:    EXAMINATION:  CTA HEAD AND NECK (XPD)    CLINICAL HISTORY:  Neuro deficit, acute, stroke suspected;left sided deficits, speech, ataxia,;    TECHNIQUE:  CT angiogram was performed from the level of the yessy to the top of the head following the IV administration of 100mL of Omnipaque 350.   Sagittal and coronal reconstructions and maximum intensity projection reconstructions were performed. Arterial stenosis percentages are based on NASCET measurement criteria.    All CT scans at this location are performed using dose optimization techniques including the  following: Automated exposure control; adjustment of the mA and/or kv; use of iterative reconstruction technique.    COMPARISON:  CT head performed earlier on 07/01/2024    FINDINGS:  Intracranial Compartment:    Ventricles and sulci are normal in size for age without evidence of hydrocephalus. No extra-axial blood or fluid collections.    Mild hypoattenuation again noted within the cerebral white matter.  Remote appearing right thalamic lacunar infarct also again seen.  No parenchymal mass, hemorrhage, edema, or major vascular distribution infarct.    Skull/Extracranial Contents (limited evaluation): No fracture. Orbits and globes are within normal limits.  Paranasal sinuses and mastoid air cells are essentially clear.    Non-Vascular Structures of the Neck/Thoracic Inlet (limited evaluation): Nonvascular soft tissues of the neck are unremarkable.  No acute or aggressive bony abnormality.  Degenerative changes noted in the spine.  Visualized upper lungs are clear aside from mild bilateral dependent subsegmental atelectasis.    Aorta: There is a common origin of the brachiocephalic left common carotid arteries which is a normal variant.  Mild aortic arch atherosclerosis without origin stenosis of the great vessels.  Scattered atherosclerosis in the subclavian arteries without flow-limiting stenosis.  Small intimal flap noted in the proximal right subclavian artery.    Extracranial carotid circulation: No hemodynamically significant stenosis, aneurysmal dilatation, or dissection.  Atherosclerosis in the distal common carotid arteries, carotid bifurcations, and carotid bulbs with less than 50% stenosis bilaterally.    Extracranial vertebral circulation: Hypoplastic right vertebral artery and dominant left vertebral artery.  Atherosclerosis at the origin of the left vertebral artery with associated mild stenosis.  No other hemodynamically significant stenosis, aneurysmal dilatation, or dissection.    Intracranial  Arteries: Moderate to severe stenosis along the proximal M2 segment of the right middle cerebral artery.  No other hemodynamically significant stenosis, occlusion, or aneurysm.    Venous structures (limited evaluation): Normal.                                       CT Head Without Contrast (Final result)  Result time 07/01/24 16:20:18      Final result by Jose Juan Schwartz MD (07/01/24 16:20:18)                   Impression:      No acute abnormality.    Atrophy and chronic white matter changes.  Old right thalamic lacunar infarct.  No evidence for territorial infarct.    All CT scans   are performed using dose optimization techniques including the following: automated exposure control; adjustment of the mA and/or kV; use of iterative reconstruction technique.  Dose modulation was employed for ALARA by means of: Automated exposure control; adjustment of the mA and/or kV according to patient size (this includes techniques or standardized protocols for targeted exams where dose is matched to indication/reason for exam; i.e. extremities or head); and/or use of iterative reconstructive technique.      Electronically signed by: Jose Juan Schwartz  Date:    07/01/2024  Time:    16:20               Narrative:    EXAMINATION:  CT HEAD WITHOUT CONTRAST    CLINICAL HISTORY:  Neuro deficit, acute, stroke suspected;    TECHNIQUE:  Low dose axial CT images obtained throughout the head without intravenous contrast. Sagittal and coronal reconstructions were performed.    COMPARISON:  None    FINDINGS:  Intracranial compartment:    Atrophy and chronic white matter changes without evidence of hydrocephalus. No extra-axial blood or fluid collections.  Old right thalamic lacunar infarct.    No parenchymal mass, hemorrhage, edema or major vascular distribution infarct.    Skull/extracranial contents (limited evaluation): Old right-sided nasal bone deformity suspected.  Mastoid air cells and paranasal sinuses are essentially clear.  Old                                        The EKG was ordered, reviewed, and independently interpreted by the ED provider.  Interpretation time: 15:02  Rate: 97 BPM  Rhythm: normal sinus rhythm  Interpretation: No acute ST changes. No STEMI.    The EKG was ordered, reviewed, and independently interpreted by the ED provider.  Interpretation time: 16:50  Rate: 56 BPM  Rhythm: Sinus bradycardia with 1st degree AV block.  Interpretation: No acute ST changes. No STEMI.                 The Emergency Provider reviewed the vital signs and test results, which are outlined above.     ED Discussion     5:21 PM: Discussed case with Alyse Collier ([facility] Medicine). Dr. Collier agrees with current care and management of pt and accepts admission.   Admitting Service:   Admitting Physician: Dr. Collier  Admit to: obs/tele    5:21 PM: Re-evaluated pt. I have discussed test results, shared treatment plan, and the need for admission with patient and family at bedside. Pt and family express understanding at this time and agree with all information. All questions answered. Pt and family have no further questions or concerns at this time. Pt is ready for admit.      ED Course as of 07/05/24 2027 Mon Jul 01, 2024   1553 WBC: 5.30 [MC]   1553 Hemoglobin: 15.7 [MC]   1617 My independent interpretation of the patient's CT of the head reveals no acute intracranial hemorrhage, mass, or midline shift.   [MC]   1725 Cardiac Monitor Interpretation:  This is my independent interpretation.   Rate: 56  Rhythm: sinus bradycardia  Indication: CVA with lytic consideration   [MC]      ED Course User Index  [MC] Bear Sorenson MD     Medical Decision Making  Differential diagnosis includes acute ischemic stroke, acute hemorrhagic stroke, electrolyte abnormality, hypoglycemia, ACS, urinary tract infection, among others.    Patient presents to the emergency department as documented.  He is left-sided weakness with an dysarthria.  His symptoms have  been progressive since Saturday, with the last worsening happening at approximately 10:00 a.m. this morning.  For this reason, last known normal 10:00 a.m..  This places the patient outside of the thrombolytic window, however he was still within the thrombectomy window.  For this reason, CT and CTA performed.  CTA demonstrates stenosis of the right M2 segment of the MCA, but no large vessel occlusion amenable to thrombectomy.  Patient will be admitted to Hospital Medicine for continued stroke workup and medical management.  I discussed this case with neurology.  Reason for no thrombolytic therapy is outside of tPA window.    Amount and/or Complexity of Data Reviewed  External Data Reviewed: notes.     Details: Reviewed a note from the patient's primary care physician from 07/01/2024 documenting a visit for similar complaints as the emergency department.  This note contributed to my medical decision-making by provided insight into the chronicity of the patient's symptoms.  Labs: ordered. Decision-making details documented in ED Course.  Radiology: ordered and independent interpretation performed. Decision-making details documented in ED Course.  ECG/medicine tests: ordered and independent interpretation performed. Decision-making details documented in ED Course.    Risk  OTC drugs.  Prescription drug management.  Decision regarding hospitalization.                ED Medication(s):  Medications   aspirin chewable tablet 324 mg (324 mg Oral Given 7/1/24 1704)   clopidogreL tablet 300 mg (300 mg Oral Given 7/1/24 1704)   lactated ringers bolus 1,000 mL (0 mLs Intravenous Stopped 7/1/24 1819)   iohexoL (OMNIPAQUE 350) injection 100 mL (100 mLs Intravenous Given 7/1/24 1639)       Discharge Medication List as of 7/3/2024 10:36 AM        START taking these medications    Details   aspirin (ECOTRIN) 81 MG EC tablet Take 1 tablet (81 mg total) by mouth once daily., Starting Thu 7/4/2024, Until Fri 7/4/2025, Normal       atorvastatin (LIPITOR) 40 MG tablet Take 1 tablet (40 mg total) by mouth once daily., Starting Thu 7/4/2024, Until Fri 7/4/2025, Normal      cefdinir (OMNICEF) 300 MG capsule Take 1 capsule (300 mg total) by mouth 2 (two) times daily. for 7 days, Starting Wed 7/3/2024, Until Wed 7/10/2024, Normal      clopidogreL (PLAVIX) 75 mg tablet Take 1 tablet (75 mg total) by mouth once daily. for 21 days, Starting Thu 7/4/2024, Until Thu 7/25/2024, Normal              Follow-up Information       Orion Santiago MD. Schedule an appointment as soon as possible for a visit in 3 day(s).    Specialty: Family Medicine  Why: call office and schedule a hospital follow up in 3-5 days  Contact information:  [address] [place] [id number]  [phone]                                 Scribe Attestation:   Scribe #1: I performed the above scribed service and the documentation accurately describes the services I performed. I attest to the accuracy of the note.     Attending:   Physician Attestation Statement for Scribe #1: I, Bear Sorenson MD, personally performed the services described in this documentation, as scribed by Ashu Fernandes, in my presence, and it is both accurate and complete.           Clinical Impression       ICD-10-CM ICD-9-CM   1. Cerebrovascular accident (CVA) due to thrombosis of right middle cerebral artery  I63.311 434.01   2. Acute focal neurological deficit  R29.818 781.99   3. History of right MCA stroke  Z86.73 V12.54   4. Primary hypertension  I10 401.9   5. Benign prostatic hyperplasia without lower urinary tract symptoms  N40.0 600.00   6. Primary osteoarthritis of right knee  M17.11 715.16   7. Dysarthria  R47.1 784.51   8. LUE weakness  R29.898 729.89   9. Weakness of left lower extremity  R29.898 729.89   10. Suspected cerebrovascular accident (CVA)  R09.89 785.9       Disposition:   Disposition: Placed in Observation  Condition: Bear Harris MD  07/05/24 2027

## 2024-07-01 NOTE — ED NOTES
Called for tele stroke and this patient is outside of window due to symptoms starting on Saturday. Call center is reaching out to the neurologist to see if they will still evaluate due to the stroke symptoms.

## 2024-07-01 NOTE — H&P
[facility] - Emergency Dept.  [facility] Medicine  History & Physical    Patient Name: Tae Nelson Jr.  MRN: [id number]  Patient Class: OP- Observation  Admission Date: 7/1/2024  Attending Physician: Alyse Collier MD   Primary Care Provider: Orion Santiago MD         Patient information was obtained from patient, spouse/SO, and ER records.     Subjective:     Principal Problem:Cerebrovascular accident (CVA) due to thrombosis of right middle cerebral artery    Chief Complaint:   Chief Complaint   Patient presents with    Cerebrovascular Accident     C/o various left side neglect and changes starting Saturday         HPI: 68 year-old male with PMH of BPH and Hypertension presented to the ER 7/1/24 with suspected stroke. He was sent by his PCP after he presented there with complaints of left sided weakness, off balance, left sided numbness/tingling and left facial numbness. He reports his symptoms began on Saturday afternoon and when he tried to get out of the vehicle and he felt he was off balance, but he didn't think much of it. He also noticed some numbness in his left arm. On Sunday, he woke up and his arm felt weaker and he also had tongue numbness with slurred speech. His wife convinced him to go to his PCP where he was sent her for stroke evaluation. He denies any n/v/d, fever, chills, palpitations. But he does report some associated shortness of breathe.     In ER, tele-stroke was consulted, initial CT head no acute finding; atrophy and chronic white matter changes, old right thalamic lacunar infarct seen. CTA h/n with moderate to severe stenosis along the proximal M2 segment of the right MCA. His symptoms have waxed and waned in the ER. Neurology recommended DAPT and to further work up and treat here unless his symptoms worsen then he may need to transfer to main campus for higher level of care.   On exam, patient with some mild slurred speech, AAO, wife and daughter at bedside. NIHSS 5 on exam. [facility]  consulted for hospital admission for ischemic stroke workup.     Past Medical History:   Diagnosis Date    Arthritis     BPH (benign prostatic hyperplasia)     Hypertension        Past Surgical History:   Procedure Laterality Date    SHOULDER SURGERY Right        Review of patient's allergies indicates:  No Known Allergies    No current facility-administered medications on file prior to encounter.     Current Outpatient Medications on File Prior to Encounter   Medication Sig    losartan (COZAAR) 100 MG tablet Take 1 tablet (100 mg total) by mouth every evening.    tamsulosin (FLOMAX) 0.4 mg Cap Take 1 capsule (0.4 mg total) by mouth once daily.     Family History       Problem Relation (Age of Onset)    Cancer Mother    No Known Problems Maternal Aunt, Maternal Uncle, Paternal Aunt, Paternal Uncle, Paternal Grandmother, Paternal Grandfather, Maternal Grandmother, Maternal Grandfather, Other    Prostate cancer Father          Tobacco Use    Smoking status: Never     Passive exposure: Never    Smokeless tobacco: Never    Tobacco comments:     Smokes weed   Substance and Sexual Activity    Alcohol use: Never    Drug use: Yes     Types: Marijuana    Sexual activity: Yes     Partners: Female     Review of Systems   Constitutional:  Negative for chills, fatigue and fever.   HENT:  Negative for congestion.    Respiratory:  Negative for cough, shortness of breath and wheezing.    Cardiovascular:  Negative for leg swelling.   Gastrointestinal:  Negative for abdominal pain, constipation, diarrhea and nausea.   Neurological:  Positive for weakness. Negative for dizziness and facial asymmetry.   Psychiatric/Behavioral:  Negative for agitation.      Objective:     Vital Signs (Most Recent):  Temp: 98.2 °F (36.8 °C) (07/01/24 1503)  Pulse: (!) 56 (07/01/24 1730)  Resp: 18 (07/01/24 1730)  BP: (!) 154/82 (07/01/24 1730)  SpO2: 99 % (07/01/24 1730) Vital Signs (24h Range):  Temp:  [97 °F (36.1 °C)-98.2 °F (36.8 °C)] 98.2 °F (36.8  °C)  Pulse:  [52-97] 56  Resp:  [14-18] 18  SpO2:  [95 %-99 %] 99 %  BP: ()/(60-82) 154/82     Weight: 86.5 kg (190 lb 11.2 oz)  Body mass index is 25.86 kg/m².     Physical Exam  Vitals and nursing note reviewed.   Constitutional:       Appearance: Normal appearance.   HENT:      Head: Normocephalic.   Eyes:      Pupils: Pupils are equal, round, and reactive to light.   Cardiovascular:      Rate and Rhythm: Normal rate and regular rhythm.      Heart sounds: No murmur heard.  Pulmonary:      Effort: Pulmonary effort is normal.      Breath sounds: Normal breath sounds.   Abdominal:      General: Bowel sounds are normal.      Palpations: Abdomen is soft.   Musculoskeletal:         General: Normal range of motion.   Skin:     General: Skin is warm and dry.   Neurological:      General: No focal deficit present.      Mental Status: He is alert and oriented to person, place, and time.      Sensory: Sensory deficit (left side) present.      Motor: Weakness (left hemiparesis) present.      Gait: Gait abnormal.   Psychiatric:         Mood and Affect: Mood normal.         Behavior: Behavior normal.              CRANIAL NERVES     CN III, IV, VI   Pupils are equal, round, and reactive to light.       Significant Labs: All pertinent labs within the past 24 hours have been reviewed.  CBC:   Recent Labs   Lab 07/01/24  1537   WBC 5.30   HGB 15.7   HCT 45.7        CMP:   Recent Labs   Lab 07/01/24  1537      K 4.1      CO2 24   GLU 83   BUN 16   CREATININE 1.3   CALCIUM 9.6   PROT 8.1   ALBUMIN 3.9   BILITOT 0.7   ALKPHOS 72   AST 18   ALT 18   ANIONGAP 11     TSH:   Recent Labs   Lab 07/01/24  1537   TSH 1.588       Significant Imaging: I have reviewed all pertinent imaging results/findings within the past 24 hours.  Assessment/Plan:     * Cerebrovascular accident (CVA) due to thrombosis of right middle cerebral artery  Patient presented with left hemiparesis, left sided numbness/tingling, left facial  numbness, unsteady gait, and slurred speech    Antithrombotics for secondary stroke prevention: Antiplatelets: Aspirin: 81 mg daily  Aspirin: 325 mg daily  Clopidogrel: 300 mg loading dose x 1, now  Clopidogrel: 75 mg daily    Statins for secondary stroke prevention and hyperlipidemia, if present:   Statins: Atorvastatin- 40 mg daily    Aggressive risk factor modification: HTN, Diet, Exercise     Rehab efforts: The patient has been evaluated by a stroke team provider and the therapy needs have been fully considered based off the presenting complaints and exam findings. The following therapy evaluations are needed: PT evaluate and treat, OT evaluate and treat, SLP evaluate and treat    Diagnostics ordered/pending: CT scan of head without contrast to asses brain parenchyma, CTA Head to assess vasculature , CTA Neck/Arch to assess vasculature, Lipid Profile to assess cholesterol levels, MRI head without contrast to assess brain parenchyma, TTE to assess cardiac function/status , TSH to assess thyroid function    VTE prophylaxis: Enoxaparin 40 mg SQ every 24 hours    BP parameters: Infarct: No intervention, SBP <220    -tele-neurology consulted     Primary osteoarthritis of right knee  -reports he has been taking ibuprofen for a few weeks d/t increased pain in his right knee  -was scheduled to have right TKA on 7/15/24 with Dr. Villavicencio, will need to postpone for now  -tylenol PRN pain      Benign prostatic hyperplasia without lower urinary tract symptoms  -hold home Flomax for now, restart when appropriate    Primary hypertension  Chronic,  allow for permissive HTN with acute stroke for 24-48 hours . Latest blood pressure and vitals reviewed-     Temp:  [97 °F (36.1 °C)-98.2 °F (36.8 °C)]   Pulse:  [52-97]   Resp:  [14-20]   BP: ()/(60-93)   SpO2:  [95 %-99 %] .   Home meds for hypertension were reviewed and noted below.   Hypertension Medications               losartan (COZAAR) 100 MG tablet Take 1 tablet (100  mg total) by mouth every evening.          While in the hospital, will manage blood pressure as follows; allow for permissive HTN, restart home losartan when appropriate    Will utilize p.r.n. blood pressure medication only if patient's blood pressure greater than 220/110 and he develops symptoms such as worsening chest pain or shortness of breath.      VTE Risk Mitigation (From admission, onward)           Ordered     enoxaparin injection 40 mg  Daily         07/01/24 1808     IP VTE HIGH RISK PATIENT  Once         07/01/24 1808     Place sequential compression device  Until discontinued         07/01/24 1808                         On 07/01/2024, patient should be placed in hospital observation services under my care in collaboration with Dr. Collier.           Radha Fernandes NP  Department of Hospital Medicine  [facility] - Emergency Dept.

## 2024-07-01 NOTE — Clinical Note
Diagnosis: Acute focal neurological deficit [287915]   Future Attending Provider: AMANDA COKER [[id number]]

## 2024-07-01 NOTE — PROGRESS NOTES
Subjective:       Patient ID: Tae Nelson Jr. is a 68 y.o. male.    Chief Complaint: Tingling      HPI Comments:       Current Outpatient Medications:     losartan (COZAAR) 100 MG tablet, Take 1 tablet (100 mg total) by mouth every evening., Disp: 90 tablet, Rfl: 1    tamsulosin (FLOMAX) 0.4 mg Cap, Take 1 capsule (0.4 mg total) by mouth once daily., Disp: 30 capsule, Rfl: 5      Sudden onset of disequilibrium, followed by left hand numbness.  Left arm weakness.  Wife noticed that the left side of his face looked different with some slurred speech.  He refused to get checked out until just now.  Wife says his speech is getting worse.  He has had no improvement or resolution of symptoms      Review of Systems   Constitutional:  Negative for activity change, appetite change and fever.   HENT:  Negative for sore throat.    Respiratory:  Negative for cough and shortness of breath.    Cardiovascular:  Negative for chest pain.   Gastrointestinal:  Negative for abdominal pain, diarrhea and nausea.   Genitourinary:  Negative for difficulty urinating.   Musculoskeletal:  Negative for arthralgias and myalgias.   Neurological:  Positive for speech difficulty, weakness and numbness. Negative for dizziness and headaches.       Objective:      Vitals:    07/01/24 1358   BP: 90/60   Pulse: 93   Temp: 97 °F (36.1 °C)   TempSrc: Axillary   SpO2: 97%   Weight: 86.6 kg (190 lb 14.7 oz)   Height: 6' (1.829 m)   PainSc: 0-No pain     Physical Exam  Vitals and nursing note reviewed.   Constitutional:       General: He is not in acute distress.     Appearance: He is well-developed. He is not diaphoretic.   HENT:      Head: Normocephalic.      Mouth/Throat:      Pharynx: No oropharyngeal exudate.   Neck:      Thyroid: No thyromegaly.   Cardiovascular:      Rate and Rhythm: Normal rate and regular rhythm.      Heart sounds: Normal heart sounds. No murmur heard.  Pulmonary:      Effort: Pulmonary effort is normal.      Breath sounds:  Normal breath sounds. No wheezing or rales.   Abdominal:      General: There is no distension.      Palpations: Abdomen is soft.   Musculoskeletal:      Cervical back: Neck supple.   Lymphadenopathy:      Cervical: No cervical adenopathy.   Skin:     General: Skin is warm and dry.   Neurological:      Mental Status: He is alert and oriented to person, place, and time.      Cranial Nerves: No cranial nerve deficit or facial asymmetry.      Motor: Weakness and pronator drift present.      Gait: Gait is intact.   Psychiatric:         Behavior: Behavior normal.         Thought Content: Thought content normal.         Judgment: Judgment normal.         Assessment:       1. Acute CVA (cerebrovascular accident)    2. Primary hypertension        Plan:   Acute CVA (cerebrovascular accident)  Comments:  With left-sided weakness and facial/speech changes.  To go immediately to ER.  Wife will drive him    Primary hypertension  Comments:  Controlled

## 2024-07-01 NOTE — FIRST PROVIDER EVALUATION
Medical screening examination initiated.  I have conducted a focused provider triage encounter, findings are as follows:    Brief history of present illness:  Patient is sent to the ER for stroke-like symptoms which began on Saturday.  Patient was sent by his PCP.    Vitals:    07/01/24 1503   BP: 108/66   BP Location: Right arm   Patient Position: Sitting   Pulse: 97   Resp: 18   Temp: 98.2 °F (36.8 °C)   TempSrc: Oral   SpO2: 97%   Weight: 86.5 kg (190 lb 11.2 oz)       Pertinent physical exam:  No acute distress    Brief workup plan:  Labs, imaging, further eval    Preliminary workup initiated; this workup will be continued and followed by the physician or advanced practice provider that is assigned to the patient when roomed.

## 2024-07-01 NOTE — ASSESSMENT & PLAN NOTE
Chronic,  allow for permissive HTN with acute stroke for 24-48 hours . Latest blood pressure and vitals reviewed-     Temp:  [97 °F (36.1 °C)-98.2 °F (36.8 °C)]   Pulse:  [52-97]   Resp:  [14-20]   BP: ()/(60-93)   SpO2:  [95 %-99 %] .   Home meds for hypertension were reviewed and noted below.   Hypertension Medications               losartan (COZAAR) 100 MG tablet Take 1 tablet (100 mg total) by mouth every evening.          While in the hospital, will manage blood pressure as follows; allow for permissive HTN, restart home losartan when appropriate    Will utilize p.r.n. blood pressure medication only if patient's blood pressure greater than 220/110 and he develops symptoms such as worsening chest pain or shortness of breath.

## 2024-07-01 NOTE — HPI
68 year-old male with PMH of BPH and Hypertension presented to the ER 7/1/24 with suspected stroke. He was sent by his PCP after he presented there with complaints of left sided weakness, off balance, left sided numbness/tingling and left facial numbness. He reports his symptoms began on Saturday afternoon and when he tried to get out of the vehicle and he felt he was off balance, but he didn't think much of it. He also noticed some numbness in his left arm. On Sunday, he woke up and his arm felt weaker and he also had tongue numbness with slurred speech. His wife convinced him to go to his PCP where he was sent her for stroke evaluation. He denies any n/v/d, fever, chills, palpitations. But he does report some associated shortness of breathe.     In ER, tele-stroke was consulted, initial CT head no acute finding; atrophy and chronic white matter changes, old right thalamic lacunar infarct seen. CTA h/n with moderate to severe stenosis along the proximal M2 segment of the right MCA. His symptoms have waxed and waned in the ER. Neurology recommended DAPT and to further work up and treat here unless his symptoms worsen then he may need to transfer to main campus for higher level of care.   On exam, patient with some mild slurred speech, AAO, wife and daughter at bedside. NIHSS 5 on exam. [facility] [place] for hospital admission for ischemic stroke workup.

## 2024-07-02 LAB
ALBUMIN SERPL BCP-MCNC: 3.5 G/DL (ref 3.5–5.2)
ALP SERPL-CCNC: 65 U/L (ref 55–135)
ALT SERPL W/O P-5'-P-CCNC: 16 U/L (ref 10–44)
ANION GAP SERPL CALC-SCNC: 10 MMOL/L (ref 8–16)
AORTIC ROOT ANNULUS: 3.58 CM
APTT PPP: 32.4 SEC (ref 21–32)
ASCENDING AORTA: 3.81 CM
AST SERPL-CCNC: 16 U/L (ref 10–40)
AV INDEX (PROSTH): 0.76
AV MEAN GRADIENT: 3 MMHG
AV PEAK GRADIENT: 6 MMHG
AV VALVE AREA BY VELOCITY RATIO: 2.56 CM²
AV VALVE AREA: 2.57 CM²
AV VELOCITY RATIO: 0.75
BASOPHILS # BLD AUTO: 0.06 K/UL (ref 0–0.2)
BASOPHILS NFR BLD: 1.2 % (ref 0–1.9)
BILIRUB SERPL-MCNC: 0.5 MG/DL (ref 0.1–1)
BSA FOR ECHO PROCEDURE: 2.09 M2
BUN SERPL-MCNC: 13 MG/DL (ref 8–23)
CALCIUM SERPL-MCNC: 8.9 MG/DL (ref 8.7–10.5)
CHLORIDE SERPL-SCNC: 106 MMOL/L (ref 95–110)
CHOLEST SERPL-MCNC: 163 MG/DL (ref 120–199)
CHOLEST/HDLC SERPL: 4.3 {RATIO} (ref 2–5)
CO2 SERPL-SCNC: 22 MMOL/L (ref 23–29)
CREAT SERPL-MCNC: 1.1 MG/DL (ref 0.5–1.4)
CV ECHO LV RWT: 0.68 CM
DIFFERENTIAL METHOD BLD: ABNORMAL
DOP CALC AO PEAK VEL: 1.22 M/S
DOP CALC AO VTI: 28.7 CM
DOP CALC LVOT AREA: 3.4 CM2
DOP CALC LVOT DIAMETER: 2.08 CM
DOP CALC LVOT PEAK VEL: 0.92 M/S
DOP CALC LVOT STROKE VOLUME: 73.7 CM3
DOP CALC RVOT PEAK VEL: 0.66 M/S
DOP CALC RVOT VTI: 15 CM
DOP CALCLVOT PEAK VEL VTI: 21.7 CM
E WAVE DECELERATION TIME: 296.7 MSEC
E/A RATIO: 0.9
E/E' RATIO: 7.05 M/S
ECHO LV POSTERIOR WALL: 1.39 CM (ref 0.6–1.1)
EOSINOPHIL # BLD AUTO: 0.2 K/UL (ref 0–0.5)
EOSINOPHIL NFR BLD: 3.3 % (ref 0–8)
ERYTHROCYTE [DISTWIDTH] IN BLOOD BY AUTOMATED COUNT: 12.1 % (ref 11.5–14.5)
EST. GFR  (NO RACE VARIABLE): >60 ML/MIN/1.73 M^2
ESTIMATED AVG GLUCOSE: 108 MG/DL (ref 68–131)
FRACTIONAL SHORTENING: 30 % (ref 28–44)
GLUCOSE SERPL-MCNC: 81 MG/DL (ref 70–110)
HBA1C MFR BLD: 5.4 % (ref 4–5.6)
HCT VFR BLD AUTO: 42.6 % (ref 40–54)
HDLC SERPL-MCNC: 38 MG/DL (ref 40–75)
HDLC SERPL: 23.3 % (ref 20–50)
HGB BLD-MCNC: 14.3 G/DL (ref 14–18)
IMM GRANULOCYTES # BLD AUTO: 0.03 K/UL (ref 0–0.04)
IMM GRANULOCYTES NFR BLD AUTO: 0.6 % (ref 0–0.5)
INR PPP: 0.9 (ref 0.8–1.2)
INTERVENTRICULAR SEPTUM: 1.42 CM (ref 0.6–1.1)
IVC DIAMETER: 1.79 CM
IVRT: 95.15 MSEC
LA MAJOR: 4.97 CM
LA MINOR: 4.89 CM
LA WIDTH: 3.6 CM
LDLC SERPL CALC-MCNC: 111.2 MG/DL (ref 63–159)
LEFT ATRIUM SIZE: 3.42 CM
LEFT ATRIUM VOLUME INDEX: 24.8 ML/M2
LEFT ATRIUM VOLUME: 51.59 CM3
LEFT INTERNAL DIMENSION IN SYSTOLE: 2.85 CM (ref 2.1–4)
LEFT VENTRICLE DIASTOLIC VOLUME INDEX: 35.58 ML/M2
LEFT VENTRICLE DIASTOLIC VOLUME: 74 ML
LEFT VENTRICLE MASS INDEX: 104 G/M2
LEFT VENTRICLE SYSTOLIC VOLUME INDEX: 14.8 ML/M2
LEFT VENTRICLE SYSTOLIC VOLUME: 30.83 ML
LEFT VENTRICULAR INTERNAL DIMENSION IN DIASTOLE: 4.09 CM (ref 3.5–6)
LEFT VENTRICULAR MASS: 217 G
LV LATERAL E/E' RATIO: 6.17 M/S
LV SEPTAL E/E' RATIO: 8.22 M/S
LVED V (TEICH): 74 ML
LVES V (TEICH): 30.83 ML
LVOT MG: 2.38 MMHG
LVOT MV: 0.76 CM/S
LYMPHOCYTES # BLD AUTO: 1.6 K/UL (ref 1–4.8)
LYMPHOCYTES NFR BLD: 32.7 % (ref 18–48)
MAGNESIUM SERPL-MCNC: 1.9 MG/DL (ref 1.6–2.6)
MCH RBC QN AUTO: 33.8 PG (ref 27–31)
MCHC RBC AUTO-ENTMCNC: 33.6 G/DL (ref 32–36)
MCV RBC AUTO: 101 FL (ref 82–98)
MONOCYTES # BLD AUTO: 0.5 K/UL (ref 0.3–1)
MONOCYTES NFR BLD: 10.8 % (ref 4–15)
MV PEAK A VEL: 0.82 M/S
MV PEAK E VEL: 0.74 M/S
MV STENOSIS PRESSURE HALF TIME: 86.04 MS
MV VALVE AREA P 1/2 METHOD: 2.56 CM2
NEUTROPHILS # BLD AUTO: 2.5 K/UL (ref 1.8–7.7)
NEUTROPHILS NFR BLD: 51.4 % (ref 38–73)
NONHDLC SERPL-MCNC: 125 MG/DL
NRBC BLD-RTO: 0 /100 WBC
OHS QRS DURATION: 94 MS
OHS QTC CALCULATION: 424 MS
PHOSPHATE SERPL-MCNC: 3.8 MG/DL (ref 2.7–4.5)
PISA TR MAX VEL: 1.73 M/S
PLATELET # BLD AUTO: 172 K/UL (ref 150–450)
PMV BLD AUTO: 10.2 FL (ref 9.2–12.9)
POTASSIUM SERPL-SCNC: 3.8 MMOL/L (ref 3.5–5.1)
PROT SERPL-MCNC: 7 G/DL (ref 6–8.4)
PROTHROMBIN TIME: 11 SEC (ref 9–12.5)
PV MEAN GRADIENT: 1 MMHG
PV PEAK GRADIENT: 2
RA MAJOR: 4.32 CM
RA PRESSURE ESTIMATED: 3 MMHG
RA WIDTH: 3.4 CM
RBC # BLD AUTO: 4.23 M/UL (ref 4.6–6.2)
RV TB RVSP: 5 MMHG
SODIUM SERPL-SCNC: 138 MMOL/L (ref 136–145)
STJ: 3.94 CM
TDI LATERAL: 0.12 M/S
TDI SEPTAL: 0.09 M/S
TDI: 0.11 M/S
TR MAX PG: 12 MMHG
TRICUSPID ANNULAR PLANE SYSTOLIC EXCURSION: 2.18 CM
TRIGL SERPL-MCNC: 69 MG/DL (ref 30–150)
TROPONIN I SERPL DL<=0.01 NG/ML-MCNC: 0.01 NG/ML (ref 0–0.03)
TV REST PULMONARY ARTERY PRESSURE: 15 MMHG
WBC # BLD AUTO: 4.92 K/UL (ref 3.9–12.7)
Z-SCORE OF LEFT VENTRICULAR DIMENSION IN END DIASTOLE: -4.43
Z-SCORE OF LEFT VENTRICULAR DIMENSION IN END SYSTOLE: -2.47

## 2024-07-02 PROCEDURE — 92610 EVALUATE SWALLOWING FUNCTION: CPT

## 2024-07-02 PROCEDURE — 21400001 HC TELEMETRY ROOM

## 2024-07-02 PROCEDURE — 85730 THROMBOPLASTIN TIME PARTIAL: CPT | Performed by: NURSE PRACTITIONER

## 2024-07-02 PROCEDURE — 80061 LIPID PANEL: CPT | Performed by: NURSE PRACTITIONER

## 2024-07-02 PROCEDURE — 85610 PROTHROMBIN TIME: CPT | Performed by: NURSE PRACTITIONER

## 2024-07-02 PROCEDURE — 25000003 PHARM REV CODE 250: Performed by: NURSE PRACTITIONER

## 2024-07-02 PROCEDURE — 97530 THERAPEUTIC ACTIVITIES: CPT

## 2024-07-02 PROCEDURE — 92523 SPEECH SOUND LANG COMPREHEN: CPT

## 2024-07-02 PROCEDURE — 97162 PT EVAL MOD COMPLEX 30 MIN: CPT

## 2024-07-02 PROCEDURE — 36415 COLL VENOUS BLD VENIPUNCTURE: CPT | Performed by: NURSE PRACTITIONER

## 2024-07-02 PROCEDURE — 84484 ASSAY OF TROPONIN QUANT: CPT | Performed by: NURSE PRACTITIONER

## 2024-07-02 PROCEDURE — 63600175 PHARM REV CODE 636 W HCPCS: Performed by: NURSE PRACTITIONER

## 2024-07-02 PROCEDURE — 97166 OT EVAL MOD COMPLEX 45 MIN: CPT

## 2024-07-02 PROCEDURE — 80053 COMPREHEN METABOLIC PANEL: CPT | Performed by: NURSE PRACTITIONER

## 2024-07-02 PROCEDURE — 84100 ASSAY OF PHOSPHORUS: CPT | Performed by: NURSE PRACTITIONER

## 2024-07-02 PROCEDURE — 85025 COMPLETE CBC W/AUTO DIFF WBC: CPT | Performed by: NURSE PRACTITIONER

## 2024-07-02 PROCEDURE — 83735 ASSAY OF MAGNESIUM: CPT | Performed by: NURSE PRACTITIONER

## 2024-07-02 RX ORDER — LOSARTAN POTASSIUM 50 MG/1
50 TABLET ORAL NIGHTLY
Status: DISCONTINUED | OUTPATIENT
Start: 2024-07-02 | End: 2024-07-03 | Stop reason: HOSPADM

## 2024-07-02 RX ORDER — LABETALOL HYDROCHLORIDE 5 MG/ML
10 INJECTION, SOLUTION INTRAVENOUS
Status: DISCONTINUED | OUTPATIENT
Start: 2024-07-02 | End: 2024-07-03 | Stop reason: HOSPADM

## 2024-07-02 RX ADMIN — ATORVASTATIN CALCIUM 40 MG: 40 TABLET, FILM COATED ORAL at 09:07

## 2024-07-02 RX ADMIN — ENOXAPARIN SODIUM 40 MG: 40 INJECTION SUBCUTANEOUS at 04:07

## 2024-07-02 RX ADMIN — CLOPIDOGREL BISULFATE 75 MG: 75 TABLET ORAL at 09:07

## 2024-07-02 RX ADMIN — HYDROCODONE BITARTRATE AND ACETAMINOPHEN 1 TABLET: 5; 325 TABLET ORAL at 08:07

## 2024-07-02 RX ADMIN — CEFTRIAXONE 1 G: 1 INJECTION, POWDER, FOR SOLUTION INTRAMUSCULAR; INTRAVENOUS at 10:07

## 2024-07-02 RX ADMIN — LOSARTAN POTASSIUM 50 MG: 50 TABLET, FILM COATED ORAL at 08:07

## 2024-07-02 RX ADMIN — ASPIRIN 81 MG: 81 TABLET, COATED ORAL at 09:07

## 2024-07-02 RX ADMIN — ACETAMINOPHEN 650 MG: 325 TABLET ORAL at 03:07

## 2024-07-02 NOTE — PHARMACY MED REC
"Admission Medication History     The home medication history was taken by Farnaz Renae.    You may go to "Admission" then "Reconcile Home Medications" tabs to review and/or act upon these items.     The home medication list has been updated by the Pharmacy department.   Please read ALL comments highlighted in yellow.   Please address this information as you see fit.    Feel free to contact us if you have any questions or require assistance.        Medications listed below were obtained from: Patient/family and Analytic software- [employer]      [facility] REC COMPLETED:         Farnaz Renae  [id number]      Current Outpatient Medications on File Prior to Encounter   Medication Sig Dispense Refill Last Dose    losartan (COZAAR) 100 MG tablet Take 1 tablet (100 mg total) by mouth every evening. 90 tablet 1 7/1/2024    tamsulosin (FLOMAX) 0.4 mg Cap Take 1 capsule (0.4 mg total) by mouth once daily. 30 capsule 5 7/1/2024                           .          "

## 2024-07-02 NOTE — HOSPITAL COURSE
67 y/o male admitted with CVA, MRI brain confirmed small lacunar infarct in right periventricular region posteriorly. He was loaded on ASA/Plavix and continued maintenance dose daily. Started on statin daily.   Neurology consulted in ER.   TTE LVEF 55-60%, normal systolic/diastolic function, PA pressure 15 mmHg, negative bubble study. EKG Sinus bradycardia with 1st degree AV block.   PT/OT recommended low intensity therapy and rolling walker. RW delivered to bedside prior to discharge.     Educated patient on low sodium/heart healthy diet, printed educational material and given to patient on discharge.   Continue taking ASA/Plavix x 21 days, then monotherapy with ASA. Cont statin therapy.   All prescriptions sent to his pharmacy on file.   UA shows UTI, culture growing GNR. Started on Rocephin IV inpatient, will send out on PO cefdinir x 7 days.     Referral sent to neurology for post op follow up for stroke.   Referral to outpatient therapy for PT/OT.   Follow up with PCP in 3-5 days for hospital follow up.     Patient seen and examined on the day of discharge.  All questions and concerns were addressed prior to discharge.    Face to face encounter with patient: 32 minutes

## 2024-07-02 NOTE — SUBJECTIVE & OBJECTIVE
Interval History: f/u stroke. Awake, alert, lying in bed, wife at bedside. Patient reports some increased feeling in left arm. PT/OT working with patient now. Cont ASA/Plavix/statin.    Review of Systems  Objective:     Vital Signs (Most Recent):  Temp: 97.6 °F (36.4 °C) (07/02/24 0719)  Pulse: 76 (07/02/24 0842)  Resp: 18 (07/02/24 0719)  BP: (!) 167/95 (07/02/24 0842)  SpO2: 95 % (07/02/24 0719) Vital Signs (24h Range):  Temp:  [97 °F (36.1 °C)-98.2 °F (36.8 °C)] 97.6 °F (36.4 °C)  Pulse:  [52-97] 76  Resp:  [14-20] 18  SpO2:  [95 %-99 %] 95 %  BP: ()/(60-95) 167/95     Weight: 86.2 kg (190 lb)  Body mass index is 25.77 kg/m².  No intake or output data in the 24 hours ending 07/02/24 1023      Physical Exam  Vitals and nursing note reviewed.   Constitutional:       Appearance: Normal appearance.   HENT:      Head: Normocephalic.   Eyes:      Pupils: Pupils are equal, round, and reactive to light.   Cardiovascular:      Rate and Rhythm: Normal rate and regular rhythm.      Heart sounds: No murmur heard.  Pulmonary:      Effort: Pulmonary effort is normal.      Breath sounds: Normal breath sounds.   Abdominal:      General: Bowel sounds are normal.      Palpations: Abdomen is soft.   Musculoskeletal:         General: Normal range of motion.   Skin:     General: Skin is warm and dry.   Neurological:      General: No focal deficit present.      Mental Status: He is alert and oriented to person, place, and time.      Sensory: Sensory deficit (left side) present.      Motor: Weakness (left hemiparesis) present.      Gait: Gait abnormal.   Psychiatric:         Mood and Affect: Mood normal.         Behavior: Behavior normal.             Significant Labs: All pertinent labs within the past 24 hours have been reviewed.  CBC:   Recent Labs   Lab 07/01/24  1537 07/02/24  0421   WBC 5.30 4.92   HGB 15.7 14.3   HCT 45.7 42.6    172     CMP:   Recent Labs   Lab 07/01/24  1537 07/02/24  0421    138   K 4.1 3.8     106   CO2 24 22*   GLU 83 81   BUN 16 13   CREATININE 1.3 1.1   CALCIUM 9.6 8.9   PROT 8.1 7.0   ALBUMIN 3.9 3.5   BILITOT 0.7 0.5   ALKPHOS 72 65   AST 18 16   ALT 18 16   ANIONGAP 11 10       Significant Imaging: I have reviewed all pertinent imaging results/findings within the past 24 hours.

## 2024-07-02 NOTE — PROGRESS NOTES
[facility]  Progress Note    Patient Name: Tae Nelson Jr.  MRN: [id number]  Patient Class: IP- Inpatient   Admission Date: 7/1/2024  Length of Stay: 1 days  Attending Physician: Alyse Collier MD  Primary Care Provider: Orion Santiago MD        Subjective:     Principal Problem:Cerebrovascular accident (CVA) due to thrombosis of right middle cerebral artery      HPI:  68 year-old male with PMH of BPH and Hypertension presented to the ER 7/1/24 with suspected stroke. He was sent by his PCP after he presented there with complaints of left sided weakness, off balance, left sided numbness/tingling and left facial numbness. He reports his symptoms began on Saturday afternoon and when he tried to get out of the vehicle and he felt he was off balance, but he didn't think much of it. He also noticed some numbness in his left arm. On Sunday, he woke up and his arm felt weaker and he also had tongue numbness with slurred speech. His wife convinced him to go to his PCP where he was sent her for stroke evaluation. He denies any n/v/d, fever, chills, palpitations. But he does report some associated shortness of breathe.     In ER, tele-stroke was consulted, initial CT head no acute finding; atrophy and chronic white matter changes, old right thalamic lacunar infarct seen. CTA h/n with moderate to severe stenosis along the proximal M2 segment of the right MCA. His symptoms have waxed and waned in the ER. Neurology recommended DAPT and to further work up and treat here unless his symptoms worsen then he may need to transfer to main [facility] for higher level of care.   On exam, patient with some mild slurred speech, AAO, wife and daughter at bedside. NIHSS 5 on exam. [facility] consulted for hospital admission for ischemic stroke workup.     Overview/Hospital Course:  69 y/o male admitted with CVA, MRI brain confirmed small lacunar infarct in right periventricular region posteriorly. He  was loaded on ASA/Plavix and continued maintenance dose daily. Started on statin daily.   Neurology consulted in ER.   TTE pending. EKG Sinus bradycardia with 1st degree AV block.   PT/OT pending.   Will start to slowly lower blood pressure, will restart losartan at 50 mg today.    Interval History: f/u stroke. Awake, alert, lying in bed, wife at bedside. Patient reports some increased feeling in left arm. PT/OT working with patient now. Cont ASA/Plavix/statin.    Review of Systems  Objective:     Vital Signs (Most Recent):  Temp: 97.6 °F (36.4 °C) (07/02/24 0719)  Pulse: 76 (07/02/24 0842)  Resp: 18 (07/02/24 0719)  BP: (!) 167/95 (07/02/24 0842)  SpO2: 95 % (07/02/24 0719) Vital Signs (24h Range):  Temp:  [97 °F (36.1 °C)-98.2 °F (36.8 °C)] 97.6 °F (36.4 °C)  Pulse:  [52-97] 76  Resp:  [14-20] 18  SpO2:  [95 %-99 %] 95 %  BP: ()/(60-95) 167/95     Weight: 86.2 kg (190 lb)  Body mass index is 25.77 kg/m².  No intake or output data in the 24 hours ending 07/02/24 1023      Physical Exam  Vitals and nursing note reviewed.   Constitutional:       Appearance: Normal appearance.   HENT:      Head: Normocephalic.   Eyes:      Pupils: Pupils are equal, round, and reactive to light.   Cardiovascular:      Rate and Rhythm: Normal rate and regular rhythm.      Heart sounds: No murmur heard.  Pulmonary:      Effort: Pulmonary effort is normal.      Breath sounds: Normal breath sounds.   Abdominal:      General: Bowel sounds are normal.      Palpations: Abdomen is soft.   Musculoskeletal:         General: Normal range of motion.   Skin:     General: Skin is warm and dry.   Neurological:      General: No focal deficit present.      Mental Status: He is alert and oriented to person, place, and time.      Sensory: Sensory deficit (left side) present.      Motor: Weakness (left hemiparesis) present.      Gait: Gait abnormal.   Psychiatric:         Mood and Affect: Mood normal.         Behavior: Behavior normal.              Significant Labs: All pertinent labs within the past 24 hours have been reviewed.  CBC:   Recent Labs   Lab 07/01/24  1537 07/02/24  0421   WBC 5.30 4.92   HGB 15.7 14.3   HCT 45.7 42.6    172     CMP:   Recent Labs   Lab 07/01/24  1537 07/02/24  0421    138   K 4.1 3.8    106   CO2 24 22*   GLU 83 81   BUN 16 13   CREATININE 1.3 1.1   CALCIUM 9.6 8.9   PROT 8.1 7.0   ALBUMIN 3.9 3.5   BILITOT 0.7 0.5   ALKPHOS 72 65   AST 18 16   ALT 18 16   ANIONGAP 11 10       Significant Imaging: I have reviewed all pertinent imaging results/findings within the past 24 hours.    Assessment/Plan:      * Cerebrovascular accident (CVA) due to thrombosis of right middle cerebral artery  Patient presented with left hemiparesis, left sided numbness/tingling, left facial numbness, unsteady gait, and slurred speech    Antithrombotics for secondary stroke prevention:   Antiplatelets: loaded with  mg and Plavix 30 mg x 1; cont ASA 81 mg and Plavix 75 mg daily    Statins for secondary stroke prevention and hyperlipidemia, if present:   Statins: Atorvastatin- 40 mg daily    Aggressive risk factor modification: HTN, Diet, Exercise     Rehab efforts: The patient has been evaluated by a stroke team provider and the therapy needs have been fully considered based off the presenting complaints and exam findings. The following therapy evaluations are needed: PT evaluate and treat, OT evaluate and treat, SLP evaluate and treat    Diagnostics ordered/pending: CT scan of head without contrast to asses brain parenchyma, CTA Head to assess vasculature , CTA Neck/Arch to assess vasculature, Lipid Profile to assess cholesterol levels, MRI head without contrast to assess brain parenchyma, TTE to assess cardiac function/status , TSH to assess thyroid function    VTE prophylaxis: Enoxaparin 40 mg SQ every 24 hours    BP parameters: Infarct: No intervention, SBP <220    -tele-neurology consulted     Primary osteoarthritis of  right knee  -reports he has been taking ibuprofen for a few weeks d/t increased pain in his right knee  -was scheduled to have right TKA on 7/15/24 with Dr. Villavicencio, will need to postpone for now  -tylenol PRN pain      Benign prostatic hyperplasia without lower urinary tract symptoms  -hold home Flomax for now, restart when appropriate    Primary hypertension  Chronic,  allow for permissive HTN with acute stroke for 24-48 hours . Latest blood pressure and vitals reviewed-     Temp:  [97 °F (36.1 °C)-98.2 °F (36.8 °C)]   Pulse:  [52-97]   Resp:  [14-20]   BP: ()/(60-95)   SpO2:  [95 %-99 %] .   Home meds for hypertension were reviewed and noted below.   Hypertension Medications               losartan (COZAAR) 100 MG tablet Take 1 tablet (100 mg total) by mouth every evening.          While in the hospital, will manage blood pressure as follows; restart losartan today at 50 mg     Will utilize p.r.n. blood pressure medication only if patient's blood pressure greater than 180/100 and he develops symptoms such as worsening chest pain or shortness of breath.      VTE Risk Mitigation (From admission, onward)           Ordered     enoxaparin injection 40 mg  Daily         07/01/24 1808     IP VTE HIGH RISK PATIENT  Once         07/01/24 1808     Place sequential compression device  Until discontinued         07/01/24 1808                    Discharge Planning   ALYSIA:      Code Status: Full Code   Is the patient medically ready for discharge?:     Reason for patient still in hospital (select all that apply): Patient trending condition, Laboratory test, Treatment, and PT / OT recommendations                 Radha Fernandes NP  Department of Hospital Medicine   [facility] - Telemetry ([facility])

## 2024-07-02 NOTE — PLAN OF CARE
AAOx4. Neuro checks q 4h. Mild Left sided weakness with mild numbness & tingling present. No slurred speech present this shift. NIH-6. Wife present & to remain at BS. Ambulates to bathroom independently. Bed alarm refused. Educated pt on importance of notifying staff to assist with ambulation, unsuccessful redirection & still refuses bed alarm. Bed in low & locked position with call light in reach. Will continue to monitor. 24h cc complete.      Problem: Adult Inpatient Plan of Care  Goal: Plan of Care Review  Outcome: Progressing  Goal: Patient-Specific Goal (Individualized)  Outcome: Progressing  Goal: Absence of Hospital-Acquired Illness or Injury  Outcome: Progressing  Goal: Optimal Comfort and Wellbeing  Outcome: Progressing  Goal: Readiness for Transition of Care  Outcome: Progressing     Problem: Infection  Goal: Absence of Infection Signs and Symptoms  Outcome: Progressing     Problem: Stroke, Ischemic (Includes Transient Ischemic Attack)  Goal: Optimal Coping  Outcome: Progressing  Goal: Effective Bowel Elimination  Outcome: Progressing  Goal: Optimal Cerebral Tissue Perfusion  Outcome: Progressing  Goal: Optimal Cognitive Function  Outcome: Progressing  Goal: Improved Communication Skills  Outcome: Progressing  Goal: Optimal Functional Ability  Outcome: Progressing  Goal: Optimal Nutrition Intake  Outcome: Progressing  Goal: Effective Oxygenation and Ventilation  Outcome: Progressing  Goal: Improved Sensorimotor Function  Outcome: Progressing  Goal: Safe and Effective Swallow  Outcome: Progressing  Goal: Effective Urinary Elimination  Outcome: Progressing     Problem: Fall Injury Risk  Goal: Absence of Fall and Fall-Related Injury  Outcome: Progressing

## 2024-07-02 NOTE — ASSESSMENT & PLAN NOTE
Chronic,  allow for permissive HTN with acute stroke for 24-48 hours . Latest blood pressure and vitals reviewed-     Temp:  [97 °F (36.1 °C)-98.2 °F (36.8 °C)]   Pulse:  [52-97]   Resp:  [14-20]   BP: ()/(60-95)   SpO2:  [95 %-99 %] .   Home meds for hypertension were reviewed and noted below.   Hypertension Medications               losartan (COZAAR) 100 MG tablet Take 1 tablet (100 mg total) by mouth every evening.          While in the hospital, will manage blood pressure as follows; restart losartan today at 50 mg     Will utilize p.r.n. blood pressure medication only if patient's blood pressure greater than 180/100 and he develops symptoms such as worsening chest pain or shortness of breath.

## 2024-07-02 NOTE — ACP (ADVANCE CARE PLANNING)
Patient states has HCPOA paperwork from  who did his will.  Advised to bring copy to have placed in his chart here or at PCP's office.

## 2024-07-02 NOTE — PLAN OF CARE
OT eval complete. Pt completed sup>sit with SBA and sit>stand with CGA using RW. Pt ambulated 50 ft x 2 with CGA. Pt completed bed>chair t/f with CGA. OT recommending low intensity intervention at d/c.

## 2024-07-02 NOTE — PLAN OF CARE
[facility] - Telemetry (Hospital)  Initial Discharge Assessment       Primary Care Provider: Orion Santiago MD    Admission Diagnosis: History of right MCA stroke [Z86.73]  Acute focal neurological deficit [R29.818]    Admission Date: 7/1/2024  Expected Discharge Date:     Transition of Care Barriers: (P) None    Payor: BCBS [place] MEDICARE / Plan: [employer]BS [employer] [place] / Product Type: Medicare Advantage /     Extended Emergency Contact Information  Primary Emergency Contact: ANGELA CHERY  Mobile Phone: [phone]  Relation: Spouse    Discharge Plan A: (P) Home with family, Other (Out patient therapy)         [id number] - [place], [place] - [address] [facility]  [address]  [facility] [address]  Phone: [phone] Fax: [phone]      Initial Assessment (most recent)       Adult Discharge Assessment - 07/02/24 1628          Discharge Assessment    Assessment Type Discharge Planning Assessment (P)      Confirmed/corrected address, phone number and insurance Yes (P)      Confirmed Demographics Correct on Facesheet (P)      Source of Information patient (P)      When was your last doctors appointment? 07/01/24 (P)      Communicated ALYSIA with patient/caregiver Yes (P)      Reason For Admission CVA (P)      People in Home spouse (P)      Facility Arrived From: doctor's office (P)      Do you expect to return to your current living situation? Yes (P)      Do you have help at home or someone to help you manage your care at home? Yes (P)      Who are your caregiver(s) and their phone number(s)? spouse (P)      Prior to hospitilization cognitive status: Alert/Oriented (P)      Current cognitive status: Alert/Oriented (P)      Walking or Climbing Stairs Difficulty no (P)      Dressing/Bathing Difficulty no (P)      Equipment Currently Used at Home none (P)      Readmission within 30 days? No (P)      Patient currently being followed by outpatient case  management? No (P)      Do you currently have service(s) that help you manage your care at home? No (P)      Do you take prescription medications? Yes (P)      Do you have prescription coverage? Yes (P)      Coverage [employer] (P)      Do you have any problems affording any of your prescribed medications? No (P)      Who is going to help you get home at discharge? spouse (P)      How do you get to doctors appointments? car, drives self (P)      Are you on dialysis? No (P)      Do you take coumadin? No (P)      Discharge Plan A Home with family;Other (P)    Out patient therapy    DME Needed Upon Discharge  none (P)      Discharge Plan discussed with: Patient (P)      Transition of Care Barriers None (P)         Housing Stability    In the last 12 months, was there a time when you were not able to pay the mortgage or rent on time? No (P)      At any time in the past 12 months, were you homeless or living in a shelter (including now)? No (P)         Transportation Needs    Has the lack of transportation kept you from medical appointments, meetings, work or from getting things needed for daily living? No (P)         Food Insecurity    Within the past 12 months, you worried that your food would run out before you got the money to buy more. Never true (P)      Within the past 12 months, the food you bought just didn't last and you didn't have money to get more. Never true (P)                    Patient lives with spouse who can be his help at home.  Discharge plan is home with out patient therapy.

## 2024-07-02 NOTE — ASSESSMENT & PLAN NOTE
Patient presented with left hemiparesis, left sided numbness/tingling, left facial numbness, unsteady gait, and slurred speech    Antithrombotics for secondary stroke prevention:   Antiplatelets: loaded with  mg and Plavix 30 mg x 1; cont ASA 81 mg and Plavix 75 mg daily    Statins for secondary stroke prevention and hyperlipidemia, if present:   Statins: Atorvastatin- 40 mg daily    Aggressive risk factor modification: HTN, Diet, Exercise     Rehab efforts: The patient has been evaluated by a stroke team provider and the therapy needs have been fully considered based off the presenting complaints and exam findings. The following therapy evaluations are needed: PT evaluate and treat, OT evaluate and treat, SLP evaluate and treat    Diagnostics ordered/pending: CT scan of head without contrast to asses brain parenchyma, CTA Head to assess vasculature , CTA Neck/Arch to assess vasculature, Lipid Profile to assess cholesterol levels, MRI head without contrast to assess brain parenchyma, TTE to assess cardiac function/status , TSH to assess thyroid function    VTE prophylaxis: Enoxaparin 40 mg SQ every 24 hours    BP parameters: Infarct: No intervention, SBP <220    -tele-neurology consulted

## 2024-07-02 NOTE — PLAN OF CARE
PT evaluation complete. SBA for bed mobility; CGA for transfers; ambulated 50'x2 CGA with RW. Pt presents with L side weakness. PT d/c rec: low intensity therapy

## 2024-07-02 NOTE — PLAN OF CARE
Updated patient on plan of care. [facility] 4/42. Numbness on left side. A/o x 4. Instructed patient to use call light for assistance, call light in reach. Hourly rounding performed. Vitals q4 hours. Education provided, questions answered/encouraged. Chart check complete.     Problem: Adult Inpatient Plan of Care  Goal: Plan of Care Review  Outcome: Progressing

## 2024-07-02 NOTE — PT/OT/SLP EVAL
Occupational Therapy Evaluation and Treatment    Name: Tae Nelson Jr.  MRN: [id number]  Admitting Diagnosis: Cerebrovascular accident (CVA) due to thrombosis of right middle cerebral artery  Recent Surgery: * No surgery found *      Recommendations:     Discharge Recommendations: Low Intensity Therapy  Level of Assistance Recommended: 24 hours light assistance  Discharge Equipment Recommendations: shower chair, walker, rolling  Barriers to discharge: None    Assessment:     Tae Nelson Jr. is a 68 y.o. male with a medical diagnosis of Cerebrovascular accident (CVA) due to thrombosis of right middle cerebral artery. He presents with performance deficits affecting function including weakness, impaired endurance, impaired cognition, impaired self care skills, decreased upper extremity function, impaired functional mobility, decreased lower extremity function, gait instability, decreased safety awareness, impaired balance.     Rehab Prognosis: Good; patient would benefit from acute OT services to address these deficits and reach maximum level of function.    Plan:     Patient to be seen 2 x/week to address the above listed problems via self-care/home management, therapeutic activities, therapeutic exercises  Plan of Care Expires: 07/16/24  Plan of Care Reviewed with: patient, spouse    Subjective     Chief Complaint: Pt with no complaints of pain  Patient Comments/Goals: none reported  Pain/Comfort:  Pain Rating 1: 0/10    Social History:  Living Environment: Patient lives with their spouse in a single story home with number of outside stair(s): 0, tub-shower combo, and grab bars  Prior Level of Function: Prior to admission, patient was independent with all ADLs and household distance ambulation.  Roles and Routines: Patient was driving and retired prior to admission.  Equipment Used at Home: none  DME owned (not currently used):  crutches  Assistance Upon Discharge: family    Objective:     Communicated with  his nurse, Jennifer, prior to session. Patient found supine with telemetry, peripheral IV upon OT entry to room.    General Precautions: Standard, fall   Orthopedic Precautions: N/A   Braces: N/A    Respiratory Status: Room air    Occupational Performance    Gait belt applied - Yes    Bed Mobility:   Scooting anteriorly to EOB to have both feet planted on floor: stand by assistance  Supine to sit from left side of bed with stand by assistance    Functional Mobility/Transfers:  Sit <> Stand Transfer with contact guard assistance with rolling walker  Bed <> Chair Transfer using Step Transfer technique with contact guard assistance with rolling walker  Functional Mobility: Pt ambulated 50 ft x 2 with CGA while using RW for stability. Pt completed this task to increase endurance and dynamic standing balance required for ADL completion.    Activities of Daily Living:  Upper Body Dressing: stand by assistance    Cognitive/Visual Perceptual:  Cognitive/Psychosocial Skills:    -     Oriented to: Person, Place, Time, Situation  -     Follows Commands/attention: Easily distracted and Follows one-step commands  -     Safety awareness/insight to disability: impaired  -     Mood/Affect/Coping skills/emotional control: Blunted, Pleasant, and impulsive  Visual/Perceptual:    -     Intact    Physical Exam:  Balance:    -     Sitting: stand by assistance  -     Standing: contact guard assistance  Sensation:    -       Intact  Upper Extremity Range of Motion:     -       Right Upper Extremity: WFL  -       Left Upper Extremity: WFL  Upper Extremity Strength:    -       Right Upper Extremity: Pt with MM grade of 4+/5 for shoulder flexion, elbow flexion, and elbow extension.   -       Left Upper Extremity: Pt with MM grade of 4-/5 for shoulder flexion, elbow flexion, and elbow extension.    Strength:    -       Right Upper Extremity: WFL  -       Left Upper Extremity: Deficits: Pt with weaker composite grasp on L side.     [facility] 6  Click ADL:  AMPAC Total Score: 21    Treatment & Education:  Therapist provided facilitation and instruction of proper body mechanics, energy conservation, and fall prevention strategies during tasks listed above  Patient educated on role of OT, POC, and goals for therapy  Patient educated on importance of OOB activities with staff member assistance and sitting OOB majority of the day  Pt educated on the importance of incorporating LUE into daily tasks as much as possible to increase LUE performance and prevent learned disuse.       Patient left up in chair with all lines intact, call button in reach, chair alarm on, and spouse and family present.    GOALS:   Multidisciplinary Problems       Occupational Therapy Goals          Problem: Occupational Therapy    Goal Priority Disciplines Outcome Interventions   Occupational Therapy Goal     OT, PT/OT     Description: Goals to be met by: 7/16/24     Patient will increase functional independence with ADLs by performing:    UE Dressing with Set-up Assistance.  Supine to sit with Supervision.  Increased functional strength to 4/5 for BUE.                         History:     Past Medical History:   Diagnosis Date    Arthritis     BPH (benign prostatic hyperplasia)     Hypertension          Past Surgical History:   Procedure Laterality Date    SHOULDER SURGERY Right        Time Tracking:     OT Date of Treatment: 07/02/24  OT Start Time: 0845  OT Stop Time: 0910  OT Total Time (min): 25 min    Billable Minutes: Evaluation 10 and Therapeutic Activity 15    DEONNA Cao  7/2/2024

## 2024-07-02 NOTE — PT/OT/SLP EVAL
Physical Therapy Evaluation and Treatment    Patient Name: Tae Nelson Jr.   MRN: [id number]  Recent Surgery: * No surgery found *      Recommendations:     Discharge Recommendations: Low Intensity Therapy   Discharge Equipment Recommendations: shower chair, walker, rolling   Barriers to discharge: None    Assessment:     Tae Nelson Jr. is a 68 y.o. male admitted with a medical diagnosis of Cerebrovascular accident (CVA) due to thrombosis of right middle cerebral artery. He presents with the following impairments/functional limitations: weakness, impaired balance, decreased safety awareness, impaired endurance, impaired self care skills, impaired functional mobility, gait instability, decreased lower extremity function, decreased upper extremity function.    Rehab Prognosis: Good; patient would benefit from acute PT services to address these deficits and reach maximum level of function.    Plan:     During this hospitalization, patient to be seen 3 x/week to address the above listed problems via gait training, therapeutic activities, therapeutic exercises, neuromuscular re-education    Plan of Care Expires: 07/16/24    Subjective     Chief Complaint: c/o pain in NICHOL knees with walking  Patient Comments/Goals: pt reports that his symptoms have almost completely resolved; pt willing to participate  Pain/Comfort:  Pain Rating 1: 0/10 at rest    Social History:  Living Environment: Patient lives with their spouse in a single story home with number of outside stair(s): 0  Prior Level of Function: Prior to admission, patient was independent, driving and retired, and ambulated household distances using no AD  Equipment Used at Home: none  DME owned (not currently used): none  Assistance Upon Discharge: significant other    Objective:     Communicated with nurse Rodriguez prior to session. Patient found supine with telemetry, peripheral IV upon PT entry to room.    General Precautions: Standard, fall   Orthopedic  "Precautions: N/A   Braces: N/A    Respiratory Status: Room air    Exams:  Cognitive Exam: Patient is oriented to Person, Place, Time, Situation; pt is impulsive and requires redirection to follow commands  RLE ROM: WFL  RLE Strength: WFL, grossly 4/5; c/o pain at knee with testing  LLE ROM: WFL  LLE Strength:  grossly 3/5; L side weakness compared to R  Sensation: Intact light touch to BLEs  -     Intact pt reports no deficits; pt said numbness in L hand has resolved  Coordination: No deficits noted during functional mobility tasks  Posture: posterior lean in sitting    Functional Mobility:  Gait belt applied - Yes  Bed Mobility  Scooting: stand by assistance  Supine to Sit: stand by assistance   Transfers  Sit to Stand: contact guard assistance with rolling walker  Bed to Chair: contact guard assistance with rolling walker and with cues for hand placement using Step Transfer  Gait  Patient ambulated 50'x2 with rolling walker and contact guard assistance. Patient demonstrates steady gait. Pt denies LOB, SOB, and dizziness. Pt c/o pain in NICHOL knees with walking. All lines remained intact throughout ambulation trail.  Balance:  Sitting: SBA with posterior lean; pt cued to sit upright and shift weight anteriorly  Standing: CGA    Therapeutic Activities and Exercises:  Patient educated on role of acute care PT and PT POC and safety while in hospital including calling nurse for mobility.  Patient educated on the following seated exercises: ankle pumps, long arc quads, marches, and knee flexion for bilateral. Patient required skilled PT for instruction of exercises and appropriate cues to perform exercises safely and appropriately.  Pt educated on increasing mobility and therex with LLE.  Educated about importance of OOB mobility and remaining up in chair most of the day.  Educated about pursed lip breathing technique and cued for use with mobility.  Pt educated on "call don't fall".    AM-PAC 6 CLICK MOBILITY  Turning " over in bed (including adjusting bedclothes, sheets and blankets)?: 3  Sitting down on and standing up from a chair with arms (e.g., wheelchair, bedside commode, etc.): 3  Moving from lying on back to sitting on the side of the bed?: 3  Moving to and from a bed to a chair (including a wheelchair)?: 3  Need to walk in hospital room?: 3  Climbing 3-5 steps with a railing?: 1  Basic Mobility Total Score: 16     Patient left up in chair with all lines intact, call button in reach, RN notified, chair alarm on, and spouse and daughter present.    GOALS:   Multidisciplinary Problems       Physical Therapy Goals          Problem: Physical Therapy    Goal Priority Disciplines Outcome Goal Variances Interventions   Physical Therapy Goal     PT, PT/OT      Description: LTG goals to be met in 14 days: 7/16/24  Pt will complete bed mobility INDEP.  Pt will complete STS INDEP.  Pt will ambulate 200' RAJI with RW.  Pt will increase AMPAC score by 2 to progress gross functional mobility.                       History:     Past Medical History:   Diagnosis Date    Arthritis     BPH (benign prostatic hyperplasia)     Hypertension        Past Surgical History:   Procedure Laterality Date    SHOULDER SURGERY Right        Time Tracking:     PT Received On: 07/02/24  PT Start Time: 0820  PT Stop Time: 0900  PT Total Time (min): 40 min     Billable Minutes: Evaluation 15 and Therapeutic Activity 25    7/2/2024

## 2024-07-02 NOTE — PT/OT/SLP EVAL
Speech Language Pathology Evaluation  Cognitive/Bedside Swallow    Patient Name:  Tae Nelson Jr.   MRN:  [id number]  Admitting Diagnosis: Cerebrovascular accident (CVA) due to thrombosis of right middle cerebral artery    Recommendations:                  General Recommendations:  Follow-up not indicated  Diet recommendations:  Regular Diet - IDDSI Level 7, Thin liquids - IDDSI Level 0   Aspiration Precautions: Frequent oral care, HOB to 90 degrees, and Standard aspiration precautions   General Precautions: Standard,    Communication strategies:  none    History:     Past Medical History:   Diagnosis Date    Arthritis     BPH (benign prostatic hyperplasia)     Hypertension        Past Surgical History:   Procedure Laterality Date    SHOULDER SURGERY Right        Social History: Patient lives with his wife in [place], [place].    Prior Intubation HX:  NA this admission    Modified Barium Swallow: NA per chart review    MRI BRAIN WITHOUT CONTRAST on 07/01/2024:     FINDINGS:  A focal restricted diffusion in the right periventricular region posteriorly consistent with small acute lacunar infarct.  No evidence for other infarct.  Moderate subcortical and periventricular FLAIR hyperintensities likely related to chronic microvascular ischemic changes.  No evidence for sinusitis.  Midline structures are symmetric.  No evidence for sinusitis.  No evidence for hemorrhage.     Impression:  As above     Electronically signed by:Jose Juan Schwartz  Date:                                            07/01/2024  Time:                                           22:32    CT HEAD WITHOUT CONTRAST on 07/01/2024:    FINDINGS:  Intracranial compartment:  Atrophy and chronic white matter changes without evidence of hydrocephalus. No extra-axial blood or fluid collections.  Old right thalamic lacunar infarct.  No parenchymal mass, hemorrhage, edema or major vascular distribution infarct.  Skull/extracranial contents (limited evaluation):  Old right-sided nasal bone deformity suspected.  Mastoid air cells and paranasal sinuses are essentially clear.  Old     Impression:  No acute abnormality.  Atrophy and chronic white matter changes.  Old right thalamic lacunar infarct.  No evidence for territorial infarct.     Electronically signed by:Jose Juan Schwartz  Date:                                            07/01/2024  Time:                                           16:20    CTA HEAD AND NECK (XPD) on 07/01/2024:     FINDINGS:  Intracranial Compartment:  Ventricles and sulci are normal in size for age without evidence of hydrocephalus. No extra-axial blood or fluid collections.  Mild hypoattenuation again noted within the cerebral white matter.  Remote appearing right thalamic lacunar infarct also again seen.  No parenchymal mass, hemorrhage, edema, or major vascular distribution infarct.    Skull/Extracranial Contents (limited evaluation): No fracture. Orbits and globes are within normal limits.  Paranasal sinuses and mastoid air cells are essentially clear.     Non-Vascular Structures of the Neck/Thoracic Inlet (limited evaluation): Nonvascular soft tissues of the neck are unremarkable.  No acute or aggressive bony abnormality.  Degenerative changes noted in the spine.  Visualized upper lungs are clear aside from mild bilateral dependent subsegmental atelectasis.     Aorta: There is a common origin of the brachiocephalic left common carotid arteries which is a normal variant.  Mild aortic arch atherosclerosis without origin stenosis of the great vessels.  Scattered atherosclerosis in the subclavian arteries without flow-limiting stenosis.  Small intimal flap noted in the proximal right subclavian artery.     Extracranial carotid circulation: No hemodynamically significant stenosis, aneurysmal dilatation, or dissection.  Atherosclerosis in the distal common carotid arteries, carotid bifurcations, and carotid bulbs with less than 50% stenosis bilaterally.      Extracranial vertebral circulation: Hypoplastic right vertebral artery and dominant left vertebral artery.  Atherosclerosis at the origin of the left vertebral artery with associated mild stenosis.  No other hemodynamically significant stenosis, aneurysmal dilatation, or dissection.     Intracranial Arteries: Moderate to severe stenosis along the proximal M2 segment of the right middle cerebral artery.  No other hemodynamically significant stenosis, occlusion, or aneurysm.     Venous structures (limited evaluation): Normal.     Impression:  Moderate to severe stenosis along the proximal M2 segment of the right MCA.  Mild origin stenosis of the left vertebral artery.  Small intimal flap in the proximal right subclavian artery.  No other significant stenosis, occlusion, aneurysm, or vascular malformation seen in the head or neck.  No new evidence of acute intracranial abnormality or abnormal enhancement.  Nonspecific white matter changes likely related to chronic microvascular ischemia.  Remote appearing right thalamic lacunar infarct again noted.     Electronically signed by:Josef Early  Date:                                            07/01/2024    Prior diet: Regular diet.    Occupation/hobbies/homemaking: Pt reports he is independent with ADLs. He reports he manages the meals in his household, but that his wife manages his medications for him. He is still currently driving and is retired.    Subjective     Pt seen bedside for [facility] with daughter and wife present throughout  Patient goals: to go home     Pain/Comfort:  Pain Rating 1: 0/10  Pain Rating Post-Intervention 1: 0/10  Pain Rating 2: 0/10  Pain Rating Post-Intervention 2: 0/10    Respiratory Status: Room air    Objective:     Cognitive Status:    Pt oriented with functional memory recall and reasoning.     Receptive Language:   Comprehension:   WFL during conversation    Pragmatics:    WFL    Expressive Language:  Verbal:    WFL during  conversation    Motor Speech:  WFL    Voice:   WFL    Oral Musculature Evaluation  Oral Musculature: WFL  Dentition: present and adequate  Secretion Management: adequate  Mucosal Quality: coated tongue  Mandibular Strength and Mobility: WFL  Oral Labial Strength and Mobility: WFL  Lingual Strength and Mobility: WFL  Velar Elevation: WFL  Buccal Strength and Mobility: WFL  Volitional Cough: present  Volitional Swallow: present  Voice Prior to PO Intake: clear    Bedside Swallow Eval:     [place] Swallow Protocol:  [place] Swallow Protocol dictates patient remain NPO if fail screener (Alexr et al. 2014).  The [place] Swallow Protocol was administered. The patient was alert and provided the instructions prior to the beginning of the protocol. The patient consumed 3 oz before putting the cup down. Patient drank with consecutive swallows. Pt without overt s/s of aspiration.    Clinical Swallow Examination:   Of note, patient self-fed throughout evaluation. Patient presented with:     CONSISTENCY  NOTES   THIN (IDDSI 0) 3 oz water challenge    No overt s/s of dysphagia/aspiration   PUREE (IDDSI 4/Extremely Thick)   TSP/TBSP bites of pudding No overt s/s of dysphagia/aspiration   SOLID (IDDSI 7/Regular) Bite of Lisa Doone cookie   No overt s/s of dysphagia/aspiration     Thickened liquids were not used in this assessment. OSherita (2018) reported that thickened liquids have no sound evidence at reducing the risk of pneumonia in patients with dysphagia and can cause harm by increasing their risk of dehydration. It also presents an increased risk of UTI, electrolyte imbalance, constipation, fecal impaction, cognitive impairment, functional decline and even death (Langmore, 2002; [place], 2016).  Thickened liquids are associated with risks including dehydration, increased pharyngeal residue, potential interference with medication absorption, and decreased quality of life (Cristin, 2013). Thickened liquids are also more likely to be  silently aspirated than thin liquids (Ollie et al., 2018). This supports the assertion that we should confirm a patient requires thickened liquids with an instrumental swallow study prior to recommending them.    References:   Cristin ELIZABETH (2013). Thickening agents used for dysphagia management: Effect on bioavailability of water, medication and feelings of satiety. Nutrition Journal, 12, 54. https://doi.org/10.1186/7549-2196-92-54    GLENN Levin, ALLISON Key, TERI Bullock, & GLENN Valiente (2018). Cough response to aspiration in thin and thick fluids during FEES in hospitalized inpatients. International journal of language & communication disorders, 53(5), 909-918. https://doi.org/10.1111/3906-7969.41941    INTERPRETATION AND RISK ASSESSMENT:  Clinical swallow evaluation (CSE) revealed oral phase characterized by lingual, labial, and buccal strength and range of motion functional for lip closure, bolus preparation and propulsion. The patient had no anterior loss of the bolus with complete closure of the lips around the utensils. No residue remained in the oral cavity following the swallow. Patient without overt clinical signs/symptoms of aspiration on any PO trials given. Pt at increased risk of silent aspiration given hx of stroke.    Compensatory Strategies  Standard aspiration precautions    Assessment:     Tae Nelson Jr. is a 68 y.o. male with a PMHx significant for BPH and Hypertension presented to the ER 7/1/24 with suspected stroke. He was sent by his PCP after he presented there with complaints of left sided weakness, off balance, left sided numbness/tingling and left facial numbness. MRI brain confirmed small lacunar infarct in right periventricular region posteriorly. He presents with functional OM and cognitive-linguistic ability to meet functional communicative needs. Pt noted that his slurred speech has resolved and ST discussed w pt SOS strategies to increase intelligibility (Speak up,  Overarticulate, Slow down). No overt s/s of dysphagia present during bedside CSE, and pt recommended for IDDSI 7-regular solids with IDDSI 0-thin liquids, following standard aspiration precautions.  No further acute SLP intervention indicated at this time. MD to re-consult if needed.    Goals:   Multidisciplinary Problems       SLP Goals       Not on file                    Plan:     Patient to be seen:      Plan of Care expires:     Plan of Care reviewed with:  patient, spouse, daughter   SLP Follow-Up:  No       Discharge recommendations:  Therapy Intensity Recommendations at Discharge: No Therapy Indicated   Barriers to Discharge:  None    Time Tracking:     SLP Treatment Date:   07/02/24  Speech Start Time:  1310  Speech Stop Time:  1335     Speech Total Time (min):  25 min    Billable Minutes: Eval 10  and Eval Swallow and Oral Function 15    Cindy Robertson MA, PL-SLP, CF-SLP  Speech Language Pathologist  07/02/2024

## 2024-07-03 VITALS
WEIGHT: 190 LBS | SYSTOLIC BLOOD PRESSURE: 136 MMHG | HEIGHT: 72 IN | RESPIRATION RATE: 18 BRPM | DIASTOLIC BLOOD PRESSURE: 68 MMHG | OXYGEN SATURATION: 97 % | TEMPERATURE: 98 F | BODY MASS INDEX: 25.73 KG/M2 | HEART RATE: 65 BPM

## 2024-07-03 PROBLEM — N30.00 ACUTE CYSTITIS WITHOUT HEMATURIA: Status: ACTIVE | Noted: 2024-07-03

## 2024-07-03 LAB
ALBUMIN SERPL BCP-MCNC: 3.5 G/DL (ref 3.5–5.2)
ALP SERPL-CCNC: 67 U/L (ref 55–135)
ALT SERPL W/O P-5'-P-CCNC: 17 U/L (ref 10–44)
ANION GAP SERPL CALC-SCNC: 10 MMOL/L (ref 8–16)
AST SERPL-CCNC: 17 U/L (ref 10–40)
BACTERIA UR CULT: ABNORMAL
BASOPHILS # BLD AUTO: 0.04 K/UL (ref 0–0.2)
BASOPHILS NFR BLD: 0.9 % (ref 0–1.9)
BILIRUB SERPL-MCNC: 0.5 MG/DL (ref 0.1–1)
BUN SERPL-MCNC: 12 MG/DL (ref 8–23)
CALCIUM SERPL-MCNC: 8.8 MG/DL (ref 8.7–10.5)
CHLORIDE SERPL-SCNC: 106 MMOL/L (ref 95–110)
CO2 SERPL-SCNC: 22 MMOL/L (ref 23–29)
CREAT SERPL-MCNC: 1 MG/DL (ref 0.5–1.4)
DIFFERENTIAL METHOD BLD: ABNORMAL
EOSINOPHIL # BLD AUTO: 0.2 K/UL (ref 0–0.5)
EOSINOPHIL NFR BLD: 3.9 % (ref 0–8)
ERYTHROCYTE [DISTWIDTH] IN BLOOD BY AUTOMATED COUNT: 11.9 % (ref 11.5–14.5)
EST. GFR  (NO RACE VARIABLE): >60 ML/MIN/1.73 M^2
GLUCOSE SERPL-MCNC: 82 MG/DL (ref 70–110)
HCT VFR BLD AUTO: 42.6 % (ref 40–54)
HGB BLD-MCNC: 14.6 G/DL (ref 14–18)
IMM GRANULOCYTES # BLD AUTO: 0.01 K/UL (ref 0–0.04)
IMM GRANULOCYTES NFR BLD AUTO: 0.2 % (ref 0–0.5)
LYMPHOCYTES # BLD AUTO: 1.7 K/UL (ref 1–4.8)
LYMPHOCYTES NFR BLD: 38.9 % (ref 18–48)
MCH RBC QN AUTO: 33.6 PG (ref 27–31)
MCHC RBC AUTO-ENTMCNC: 34.3 G/DL (ref 32–36)
MCV RBC AUTO: 98 FL (ref 82–98)
MONOCYTES # BLD AUTO: 0.4 K/UL (ref 0.3–1)
MONOCYTES NFR BLD: 9.7 % (ref 4–15)
NEUTROPHILS # BLD AUTO: 2 K/UL (ref 1.8–7.7)
NEUTROPHILS NFR BLD: 46.4 % (ref 38–73)
NRBC BLD-RTO: 0 /100 WBC
PLATELET # BLD AUTO: 175 K/UL (ref 150–450)
PMV BLD AUTO: 10.1 FL (ref 9.2–12.9)
POTASSIUM SERPL-SCNC: 3.6 MMOL/L (ref 3.5–5.1)
PROT SERPL-MCNC: 7 G/DL (ref 6–8.4)
RBC # BLD AUTO: 4.34 M/UL (ref 4.6–6.2)
SODIUM SERPL-SCNC: 138 MMOL/L (ref 136–145)
WBC # BLD AUTO: 4.32 K/UL (ref 3.9–12.7)

## 2024-07-03 PROCEDURE — 25000003 PHARM REV CODE 250: Performed by: NURSE PRACTITIONER

## 2024-07-03 PROCEDURE — 97116 GAIT TRAINING THERAPY: CPT

## 2024-07-03 PROCEDURE — 85025 COMPLETE CBC W/AUTO DIFF WBC: CPT | Performed by: NURSE PRACTITIONER

## 2024-07-03 PROCEDURE — 97530 THERAPEUTIC ACTIVITIES: CPT

## 2024-07-03 PROCEDURE — 80053 COMPREHEN METABOLIC PANEL: CPT | Performed by: NURSE PRACTITIONER

## 2024-07-03 PROCEDURE — 63600175 PHARM REV CODE 636 W HCPCS: Performed by: NURSE PRACTITIONER

## 2024-07-03 PROCEDURE — 97535 SELF CARE MNGMENT TRAINING: CPT

## 2024-07-03 PROCEDURE — 36415 COLL VENOUS BLD VENIPUNCTURE: CPT | Performed by: NURSE PRACTITIONER

## 2024-07-03 RX ORDER — CEFDINIR 300 MG/1
300 CAPSULE ORAL 2 TIMES DAILY
Qty: 14 CAPSULE | Refills: 0 | Status: SHIPPED | OUTPATIENT
Start: 2024-07-03 | End: 2024-07-10

## 2024-07-03 RX ORDER — ASPIRIN 81 MG/1
81 TABLET ORAL DAILY
Qty: 90 TABLET | Refills: 0 | Status: SHIPPED | OUTPATIENT
Start: 2024-07-04 | End: 2025-07-04

## 2024-07-03 RX ORDER — CLOPIDOGREL BISULFATE 75 MG/1
75 TABLET ORAL DAILY
Qty: 21 TABLET | Refills: 0 | Status: SHIPPED | OUTPATIENT
Start: 2024-07-04 | End: 2024-07-25

## 2024-07-03 RX ORDER — ATORVASTATIN CALCIUM 40 MG/1
40 TABLET, FILM COATED ORAL DAILY
Qty: 90 TABLET | Refills: 0 | Status: SHIPPED | OUTPATIENT
Start: 2024-07-04 | End: 2025-07-04

## 2024-07-03 RX ADMIN — CLOPIDOGREL BISULFATE 75 MG: 75 TABLET ORAL at 09:07

## 2024-07-03 RX ADMIN — ATORVASTATIN CALCIUM 40 MG: 40 TABLET, FILM COATED ORAL at 09:07

## 2024-07-03 RX ADMIN — ASPIRIN 81 MG: 81 TABLET, COATED ORAL at 09:07

## 2024-07-03 RX ADMIN — CEFTRIAXONE 1 G: 1 INJECTION, POWDER, FOR SOLUTION INTRAMUSCULAR; INTRAVENOUS at 09:07

## 2024-07-03 NOTE — DISCHARGE SUMMARY
[facility] Medicine  Discharge Summary      Patient Name: Tae Nelson Jr.  MRN: [id number]  OSCAR: [id number]  Patient Class: IP- Inpatient  Admission Date: 7/1/2024  Hospital Length of Stay: 2 days  Discharge Date and Time: 7/3/2024 12:32 PM  Attending Physician: Alyse Collier MD  Discharging Provider: Radha Fernandes NP  Primary Care Provider: Orion Santiago MD    Primary Care Team: Networked reference to record PCT     HPI:   68 year-old male with PMH of BPH and Hypertension presented to the ER 7/1/24 with suspected stroke. He was sent by his PCP after he presented there with complaints of left sided weakness, off balance, left sided numbness/tingling and left facial numbness. He reports his symptoms began on Saturday afternoon and when he tried to get out of the vehicle and he felt he was off balance, but he didn't think much of it. He also noticed some numbness in his left arm. On Sunday, he woke up and his arm felt weaker and he also had tongue numbness with slurred speech. His wife convinced him to go to his PCP where he was sent her for stroke evaluation. He denies any n/v/d, fever, chills, palpitations. But he does report some associated shortness of breathe.     In ER, tele-stroke was consulted, initial CT head no acute finding; atrophy and chronic white matter changes, old right thalamic lacunar infarct seen. CTA h/n with moderate to severe stenosis along the proximal M2 segment of the right MCA. His symptoms have waxed and waned in the ER. Neurology recommended DAPT and to further work up and treat here unless his symptoms worsen then he may need to transfer to main [facility] for higher level of care.   On exam, patient with some mild slurred speech, AAO, wife and daughter at bedside. NIHSS 5 on exam. [facility] consulted for hospital admission for ischemic stroke workup.     * No surgery found *      Hospital Course:   67 y/o male admitted with CVA, MRI brain confirmed  small lacunar infarct in right periventricular region posteriorly. He was loaded on ASA/Plavix and continued maintenance dose daily. Started on statin daily.   Neurology consulted in ER.   TTE LVEF 55-60%, normal systolic/diastolic function, PA pressure 15 mmHg, negative bubble study. EKG Sinus bradycardia with 1st degree AV block.   PT/OT recommended low intensity therapy and rolling walker. RW delivered to bedside prior to discharge.     Educated patient on low sodium/heart healthy diet, printed educational material and given to patient on discharge.   Continue taking ASA/Plavix x 21 days, then monotherapy with ASA. Cont statin therapy.   All prescriptions sent to his pharmacy on file.   UA shows UTI, culture growing GNR. Started on Rocephin IV inpatient, will send out on PO cefdinir x 7 days.     Referral sent to neurology for post op follow up for stroke.   Referral to outpatient therapy for PT/OT.   Follow up with PCP in 3-5 days for hospital follow up.     Patient seen and examined on the day of discharge.  All questions and concerns were addressed prior to discharge.    Face to face encounter with patient: 32 minutes     Goals of Care Treatment Preferences:  Code Status: Full Code      Consults:   Consults (From admission, onward)          Status Ordering Provider     Inpatient consult to Social Work  Once        Provider:  (Not yet assigned)    Completed RAISSA RILEY     IP consult to case management/social work  Once        Provider:  (Not yet assigned)    Completed RAISSA RILEY     Consult to Telemedicine - Acute Stroke  Once        Provider:  Orion Cerrato MD    Completed RAISSA RILEY            Neuro  * Cerebrovascular accident (CVA) due to thrombosis of right middle cerebral artery  Patient presented with left hemiparesis, left sided numbness/tingling, left facial numbness, unsteady gait, and slurred speech    Antithrombotics for secondary stroke prevention:   Antiplatelets:  loaded with  mg and Plavix 30 mg x 1; cont ASA 81 mg and Plavix 75 mg daily    Statins for secondary stroke prevention and hyperlipidemia, if present:   Statins: Atorvastatin- 40 mg daily    Aggressive risk factor modification: HTN, Diet, Exercise     Rehab efforts: The patient has been evaluated by a stroke team provider and the therapy needs have been fully considered based off the presenting complaints and exam findings. The following therapy evaluations are needed: PT evaluate and treat, OT evaluate and treat, SLP evaluate and treat    Diagnostics ordered/pending: CT scan of head without contrast to asses brain parenchyma, CTA Head to assess vasculature , CTA Neck/Arch to assess vasculature, Lipid Profile to assess cholesterol levels, MRI head without contrast to assess brain parenchyma, TTE to assess cardiac function/status , TSH to assess thyroid function    VTE prophylaxis: Enoxaparin 40 mg SQ every 24 hours    BP parameters: Infarct: No intervention, SBP <220    -tele-neurology consulted     Cardiac/Vascular  Primary hypertension  Chronic,  allow for permissive HTN with acute stroke for 24-48 hours . Latest blood pressure and vitals reviewed-     Temp:  [96.6 °F (35.9 °C)-98.2 °F (36.8 °C)]   Pulse:  [50-65]   Resp:  [16-18]   BP: (123-179)/(67-92)   SpO2:  [93 %-97 %] .   Home meds for hypertension were reviewed and noted below.   Hypertension Medications               losartan (COZAAR) 100 MG tablet Take 1 tablet (100 mg total) by mouth every evening.          While in the hospital, will manage blood pressure as follows; restart losartan today at 50 mg     Will utilize p.r.n. blood pressure medication only if patient's blood pressure greater than 180/100 and he develops symptoms such as worsening chest pain or shortness of breath.    Renal/  Acute cystitis without hematuria  -UA with UTI, culture growing GNR  -started on Rocephin IV  -d/c on cefdinir PO x 7 days    Benign prostatic hyperplasia  "without lower urinary tract symptoms  -hold home Flomax for now, restart when appropriate    Orthopedic  Primary osteoarthritis of right knee  -reports he has been taking ibuprofen for a few weeks d/t increased pain in his right knee  -was scheduled to have right TKA on 7/15/24 with Dr. Villavicencio, will need to postpone for now  -tylenol PRN pain      Final Active Diagnoses:    Diagnosis Date Noted POA    PRINCIPAL PROBLEM:  Cerebrovascular accident (CVA) due to thrombosis of right middle cerebral artery [I63.311] 07/01/2024 Yes    Acute cystitis without hematuria [N30.00] 07/03/2024 Yes    Primary hypertension [I10] 07/01/2024 Yes    Benign prostatic hyperplasia without lower urinary tract symptoms [N40.0] 07/01/2024 Yes    Primary osteoarthritis of right knee [M17.11] 07/01/2024 Yes      Problems Resolved During this Admission:       Discharged Condition: good    Disposition: Home or Self Care    Follow Up:   Follow-up Information       Orion Santiago MD. Schedule an appointment as soon as possible for a visit in 3 day(s).    Specialty: Family Medicine  Why: call office and schedule a hospital follow up in 3-5 days  Contact information:  [address] [place] [id number]  [phone]                           Patient Instructions:      WALKER FOR HOME USE     Order Specific Question Answer Comments   Type of Walker: Adult (5'4"-6'6")    With wheels? Yes    Height: 6' (1.829 m)    Weight: 86.2 kg (190 lb)    Length of need (1-99 months): 99    Does patient have medical equipment at home? none    Please check all that apply: Patient's condition impairs ambulation.    Please check all that apply: Walker will be used for gait training.    Please check all that apply: Patient needs help to get in and out of chair.    Please check all that apply: Patient is unable to safely ambulate without equipment.      Ambulatory referral/consult to Physical/Occupational Therapy   Standing Status: Future   Referral " Priority: Routine Referral Type: Physical Medicine   Referral Reason: Specialty Services Required   Requested Specialty: Physical Therapy   Number of Visits Requested: 1     Ambulatory referral/consult to Neurology   Standing Status: Future   Referral Priority: Routine Referral Type: Consultation   Referral Reason: Specialty Services Required   Requested Specialty: Neurology   Number of Visits Requested: 1     Diet Cardiac   Order Comments: 2 gram sodium diet     Activity as tolerated       Significant Diagnostic Studies: Labs: Dictation #1  MRN:[id number]  CSN:[id number] CMP   Recent Labs   Lab 07/02/24  0421 07/03/24  0336    138   K 3.8 3.6    106   CO2 22* 22*   GLU 81 82   BUN 13 12   CREATININE 1.1 1.0   CALCIUM 8.9 8.8   PROT 7.0 7.0   ALBUMIN 3.5 3.5   BILITOT 0.5 0.5   ALKPHOS 65 67   AST 16 17   ALT 16 17   ANIONGAP 10 10    and CBC   Recent Labs   Lab 07/02/24 0421 07/03/24  0336   WBC 4.92 4.32   HGB 14.3 14.6   HCT 42.6 42.6    175       Pending Diagnostic Studies:       None           Medications:  Reconciled Home Medications:      Medication List        START taking these medications      aspirin 81 MG EC tablet  Commonly known as: ECOTRIN  Take 1 tablet (81 mg total) by mouth once daily.  Start taking on: July 4, 2024     atorvastatin 40 MG tablet  Commonly known as: LIPITOR  Take 1 tablet (40 mg total) by mouth once daily.  Start taking on: July 4, 2024     cefdinir 300 MG capsule  Commonly known as: OMNICEF  Take 1 capsule (300 mg total) by mouth 2 (two) times daily. for 7 days     clopidogreL 75 mg tablet  Commonly known as: PLAVIX  Take 1 tablet (75 mg total) by mouth once daily. for 21 days  Start taking on: July 4, 2024            CONTINUE taking these medications      losartan 100 MG tablet  Commonly known as: COZAAR  Take 1 tablet (100 mg total) by mouth every evening.     tamsulosin 0.4 mg Cap  Commonly known as: FLOMAX  Take 1 capsule (0.4 mg total) by mouth once daily.               Indwelling Lines/Drains at time of discharge:   Lines/Drains/Airways       None                   Time spent on the discharge of patient: 40 minutes         Radha Fernandes NP  Department of Hospital Medicine  [facility] - Telemetry ([facility])

## 2024-07-03 NOTE — PLAN OF CARE
Pt is progressing well. SBA for bed mobility and transfers; ambulated 220' SBA with RW; ambulated to bathroom and back SBA with no AD; stood 8 minutes SBA in bathroom at sink to perform ADLs. PT d/c rec: low intensity therapy

## 2024-07-03 NOTE — PT/OT/SLP PROGRESS
"Physical Therapy Treatment    Patient Name:  Tae Nelson Jr.   MRN:  [id number]    Recommendations:     Discharge Recommendations: Low Intensity Therapy  Discharge Equipment Recommendations: shower chair, walker, rolling  Barriers to discharge: None    Assessment:     Tae Nelson Jr. is a 68 y.o. male admitted with a medical diagnosis of Cerebrovascular accident (CVA) due to thrombosis of right middle cerebral artery.  He presents with the following impairments/functional limitations: weakness, impaired endurance, impaired self care skills, impaired functional mobility, gait instability, impaired balance, decreased safety awareness, decreased upper extremity function, decreased lower extremity function .    Rehab Prognosis: Good; patient would benefit from acute skilled PT services to address these deficits and reach maximum level of function.    Recent Surgery: * No surgery found *      Plan:     During this hospitalization, patient to be seen 3 x/week to address the identified rehab impairments via gait training, therapeutic activities, therapeutic exercises, neuromuscular re-education and progress toward the following goals:    Plan of Care Expires:  07/16/24    Subjective     Chief Complaint: c/o being tired  Patient/Family Comments/goals: "I'm doing good"; pt reports being ready to go home  Pain/Comfort:  Pain Rating 1: 5/10  Location 1: head (c/o headache)      Objective:     Communicated with nurse Bloom prior to session.  Patient found right sidelying with peripheral IV, telemetry upon PT entry to room.     General Precautions: Standard, fall  Orthopedic Precautions: N/A  Braces: N/A  Respiratory Status: Room air     Functional Mobility:  Bed Mobility:     Rolling Left:  stand by assistance  Scooting: stand by assistance  Supine to Sit: stand by assistance  Transfers:     Sit to Stand:  stand by assistance with no AD  Bed to Chair: stand by assistance with  no AD  using  Step Transfer  Gait: pt " "ambulated 220' SBA with RW. Pt ambulated another 15' to and from chair>bathroom SBA with no AD. Pt demonstrates steady gait with no LOB, SOB, or dizziness.  Balance: sitting and standing: SBA  Pt stood for 8 min at the sink with SBA to perform ADLs.      AM-PAC 6 CLICK MOBILITY  Turning over in bed (including adjusting bedclothes, sheets and blankets)?: 4  Sitting down on and standing up from a chair with arms (e.g., wheelchair, bedside commode, etc.): 3  Moving from lying on back to sitting on the side of the bed?: 4  Moving to and from a bed to a chair (including a wheelchair)?: 3  Need to walk in hospital room?: 3  Climbing 3-5 steps with a railing?: 1  Basic Mobility Total Score: 18       Treatment & Education:  Pt educated on role of PT and POC.  Reviewed therex with pt and educated on doing every hour.  Pt educated on increasing time OOB in chair.  Pt and spouse educated on importance of increasing LUE use.  Pt educated on "call don't fall".    Patient left up in chair with all lines intact, call button in reach, chair alarm on, nurse notified, and wife present..    GOALS:   Multidisciplinary Problems       Physical Therapy Goals          Problem: Physical Therapy    Goal Priority Disciplines Outcome Goal Variances Interventions   Physical Therapy Goal     PT, PT/OT      Description: LTG goals to be met in 14 days: 7/16/24  Pt will complete bed mobility INDEP.  Pt will complete STS INDEP.  Pt will ambulate 200' RAJI with RW.  Pt will increase AMPAC score by 2 to progress gross functional mobility.                       Time Tracking:     PT Received On: 07/03/24  PT Start Time: 0810     PT Stop Time: 0840  PT Total Time (min): 30 min     Billable Minutes: Gait Training 15 and Therapeutic Activity 15    Treatment Type: Treatment  PT/PTA: PT     Number of PTA visits since last PT visit: 0     07/03/2024  "

## 2024-07-03 NOTE — ASSESSMENT & PLAN NOTE
Chronic,  allow for permissive HTN with acute stroke for 24-48 hours . Latest blood pressure and vitals reviewed-     Temp:  [96.6 °F (35.9 °C)-98.2 °F (36.8 °C)]   Pulse:  [50-65]   Resp:  [16-18]   BP: (123-179)/(67-92)   SpO2:  [93 %-97 %] .   Home meds for hypertension were reviewed and noted below.   Hypertension Medications               losartan (COZAAR) 100 MG tablet Take 1 tablet (100 mg total) by mouth every evening.          While in the hospital, will manage blood pressure as follows; restart losartan today at 50 mg     Will utilize p.r.n. blood pressure medication only if patient's blood pressure greater than 180/100 and he develops symptoms such as worsening chest pain or shortness of breath.

## 2024-07-03 NOTE — PLAN OF CARE
07/03/24 1136   Post-Acute Status   Post-Acute Authorization HME   HME Status Set-up Complete/Auth obtained   Discharge Plan   Discharge Plan A Home with family     Patient declined Shower chair.  Agreed to Rolling Walker.  Approved by [facility] and delivered to patient's room.  Nurse Mccall notified.

## 2024-07-03 NOTE — PT/OT/SLP PROGRESS
Occupational Therapy   Treatment    Name: Tae Nelson Jr.  MRN: [id number]  Admitting Diagnosis:  Cerebrovascular accident (CVA) due to thrombosis of right middle cerebral artery       Recommendations:     Discharge Recommendations: Low Intensity Therapy  Discharge Equipment Recommendations:  walker, rolling, shower chair  Barriers to discharge:  None    Assessment:     Tae Nelson Jr. is a 68 y.o. male with a medical diagnosis of Cerebrovascular accident (CVA) due to thrombosis of right middle cerebral artery.  He presents with the following performance deficits affecting function are weakness, impaired endurance, impaired cognition, impaired sensation, decreased coordination, impaired self care skills, impaired functional mobility, decreased lower extremity function, decreased upper extremity function, decreased safety awareness, impaired balance.     Rehab Prognosis:  Good; patient would benefit from acute skilled OT services to address these deficits and reach maximum level of function.       Plan:     Patient to be seen 2 x/week to address the above listed problems via self-care/home management, therapeutic activities, therapeutic exercises  Plan of Care Expires: 07/16/24  Plan of Care Reviewed with: patient, spouse    Subjective     Chief Complaint: Pt c/o headache  Patient/Family Comments/goals: none reported  Pain/Comfort:  Pain Rating 1: 5/10  Location 1: head (Pt with complaints of headache)  Pain Addressed 1: Reposition, Distraction    Objective:     Communicated with: his nurse, Merle, prior to session.  Patient found supine with telemetry, peripheral IV upon OT entry to room.    General Precautions: Standard, fall    Orthopedic Precautions:N/A  Braces: N/A  Respiratory Status: Room air     Occupational Performance:     Bed Mobility:    Patient completed Supine to Sit with stand by assistance     Functional Mobility/Transfers:  Patient completed Sit <> Stand Transfer with stand by assistance  with   rolling walker   Patient completed Bed <> Chair Transfer using Step Transfer technique with stand by assistance with rolling walker  Functional Mobility: Pt ambulated 220 ft with SBA while using RW for balance. Pt completed this activity to increase endurance and dynamic standing balance required for ADL completion.     Activities of Daily Living:  Grooming: stand by assistance Pt completed oral hygiene and face washing while standing at the sink with SBA.   Lower Body Dressing: stand by assistance Pt donned/doffed socks with SBA while seated EOB.      [facility] 6 Click ADL: 21    Treatment & Education:  Pt educated on importance of sitting upright as much as possible to increase upright tolerance. Pt educated on importance of using LUE as much as possible in functional tasks to increase LUE performance and to prevent learned disuse. Pt educated on using call button for assist during all OOB activities. Pt agreed to and expressed understanding of the above education.     Patient left up in chair with all lines intact, call button in reach, chair alarm on, and wife present    GOALS:   Multidisciplinary Problems       Occupational Therapy Goals          Problem: Occupational Therapy    Goal Priority Disciplines Outcome Interventions   Occupational Therapy Goal     OT, PT/OT Progressing    Description: Goals to be met by: 7/16/24     Patient will increase functional independence with ADLs by performing:    UE Dressing with Set-up Assistance.  Supine to sit with Supervision.  Increased functional strength to 4/5 for BUE.                         Time Tracking:     OT Date of Treatment: 07/03/24  OT Start Time: 0820  OT Stop Time: 0845  OT Total Time (min): 25 min    Billable Minutes:Self Care/Home Management 10  Therapeutic Activity 15             DEONNA Cao  7/3/2024

## 2024-07-03 NOTE — PLAN OF CARE
Pt tolerated session well, with increased participation and improved performance throughout. Pt completed sup>sit with SBA and sit>stand with SBA. Pt ambulated 220 ft with SBA while using RW. Pt stood at sink to perform self care for ~8 minutes with SBA. Pt completed LBD with SBA while seated EOB. Pt completed bed>chair t/f with SBA. OT still recommending low intensity intervention at d/c.

## 2024-07-03 NOTE — PLAN OF CARE
Went over discharge instructions with patient at bedside.  Stressed the importance of making and keeping all appointments.  Prescriptions delivered to bedside. Walked delivered to bedside.  Pt verbalized understanding.   Had all questions regarding discharge answered to satisfaction.  IV removed without complications.  Tele box removed and returned to monitor tech.  Discharged patient via wheelchair to personal transportation.

## 2024-07-03 NOTE — PLAN OF CARE
"No complaints or changes this shift. Pt reports that numbness & tingling in LUE & LLE less prominent with "feeling" returning. Bed in low & locked position with call light in reach. Wife at BS. Refuses bed alarm. Will continue to monitor. 24h cc complete.      Problem: Adult Inpatient Plan of Care  Goal: Plan of Care Review  Outcome: Progressing  Goal: Patient-Specific Goal (Individualized)  Outcome: Progressing  Goal: Absence of Hospital-Acquired Illness or Injury  Outcome: Progressing  Goal: Optimal Comfort and Wellbeing  Outcome: Progressing  Goal: Readiness for Transition of Care  Outcome: Progressing     Problem: Infection  Goal: Absence of Infection Signs and Symptoms  Outcome: Progressing     Problem: Stroke, Ischemic (Includes Transient Ischemic Attack)  Goal: Optimal Coping  Outcome: Progressing  Goal: Effective Bowel Elimination  Outcome: Progressing  Goal: Optimal Cerebral Tissue Perfusion  Outcome: Progressing  Goal: Optimal Cognitive Function  Outcome: Progressing  Goal: Improved Communication Skills  Outcome: Progressing  Goal: Optimal Functional Ability  Outcome: Progressing  Goal: Optimal Nutrition Intake  Outcome: Progressing  Goal: Effective Oxygenation and Ventilation  Outcome: Progressing  Goal: Improved Sensorimotor Function  Outcome: Progressing  Goal: Safe and Effective Swallow  Outcome: Progressing  Goal: Effective Urinary Elimination  Outcome: Progressing     Problem: Fall Injury Risk  Goal: Absence of Fall and Fall-Related Injury  Outcome: Progressing     "

## 2024-07-03 NOTE — PLAN OF CARE
[facility] - Telemetry (Hospital)  Discharge Final Note    Primary Care Provider: Orion Santiago MD    Expected Discharge Date: 7/3/2024    Final Discharge Note (most recent)       Final Note - 07/03/24 1138          Final Note    Assessment Type Final Discharge Note (P)      Anticipated Discharge Disposition Home or Self Care (P)      Hospital Resources/Appts/Education Provided Appointments scheduled and added to AVS (P)         Post-Acute Status    Post-Acute Authorization HME (P)      HME Status Set-up Complete/Auth obtained (P)    Rolling Walker [facility]    Discharge Delays None known at this time (P)                      Important Message from Medicare             Contact Info       Orion Santiago MD   Specialty: Family Medicine   Relationship: PCP - General    [address] [place] [id number]   Phone: [phone]       Next Steps: Schedule an appointment as soon as possible for a visit in 3 day(s)    Instructions: call office and schedule a hospital follow up in 3-5 days          Patient aware that [facility] will call and schedule out patient services.

## 2024-07-07 PROBLEM — I70.0 AORTIC ATHEROSCLEROSIS: Status: ACTIVE | Noted: 2024-07-07

## 2024-07-08 ENCOUNTER — TELEPHONE (OUTPATIENT)
Dept: PHYSICAL MEDICINE AND REHAB | Facility: CLINIC | Age: 69
End: 2024-07-08
Payer: MEDICARE

## 2024-07-08 ENCOUNTER — OFFICE VISIT (OUTPATIENT)
Dept: FAMILY MEDICINE | Facility: CLINIC | Age: 69
End: 2024-07-08
Payer: MEDICARE

## 2024-07-08 ENCOUNTER — TELEPHONE (OUTPATIENT)
Dept: NEUROLOGY | Facility: CLINIC | Age: 69
End: 2024-07-08
Payer: MEDICARE

## 2024-07-08 VITALS
BODY MASS INDEX: 26.22 KG/M2 | TEMPERATURE: 96 F | DIASTOLIC BLOOD PRESSURE: 84 MMHG | WEIGHT: 193.56 LBS | OXYGEN SATURATION: 97 % | SYSTOLIC BLOOD PRESSURE: 122 MMHG | HEART RATE: 62 BPM | HEIGHT: 72 IN

## 2024-07-08 DIAGNOSIS — I70.0 AORTIC ATHEROSCLEROSIS: ICD-10-CM

## 2024-07-08 DIAGNOSIS — I63.311 CEREBROVASCULAR ACCIDENT (CVA) DUE TO THROMBOSIS OF RIGHT MIDDLE CEREBRAL ARTERY: Primary | ICD-10-CM

## 2024-07-08 DIAGNOSIS — N30.00 ACUTE CYSTITIS WITHOUT HEMATURIA: ICD-10-CM

## 2024-07-08 DIAGNOSIS — M17.11 ARTHRITIS OF RIGHT KNEE: ICD-10-CM

## 2024-07-08 DIAGNOSIS — I10 PRIMARY HYPERTENSION: ICD-10-CM

## 2024-07-08 PROCEDURE — 3074F SYST BP LT 130 MM HG: CPT | Mod: CPTII,S$GLB,, | Performed by: FAMILY MEDICINE

## 2024-07-08 PROCEDURE — 3044F HG A1C LEVEL LT 7.0%: CPT | Mod: CPTII,S$GLB,, | Performed by: FAMILY MEDICINE

## 2024-07-08 PROCEDURE — 1101F PT FALLS ASSESS-DOCD LE1/YR: CPT | Mod: CPTII,S$GLB,, | Performed by: FAMILY MEDICINE

## 2024-07-08 PROCEDURE — 3008F BODY MASS INDEX DOCD: CPT | Mod: CPTII,S$GLB,, | Performed by: FAMILY MEDICINE

## 2024-07-08 PROCEDURE — 1126F AMNT PAIN NOTED NONE PRSNT: CPT | Mod: CPTII,S$GLB,, | Performed by: FAMILY MEDICINE

## 2024-07-08 PROCEDURE — 1111F DSCHRG MED/CURRENT MED MERGE: CPT | Mod: CPTII,S$GLB,, | Performed by: FAMILY MEDICINE

## 2024-07-08 PROCEDURE — G2211 COMPLEX E/M VISIT ADD ON: HCPCS | Mod: S$GLB,,, | Performed by: FAMILY MEDICINE

## 2024-07-08 PROCEDURE — 3288F FALL RISK ASSESSMENT DOCD: CPT | Mod: CPTII,S$GLB,, | Performed by: FAMILY MEDICINE

## 2024-07-08 PROCEDURE — 1159F MED LIST DOCD IN RCRD: CPT | Mod: CPTII,S$GLB,, | Performed by: FAMILY MEDICINE

## 2024-07-08 PROCEDURE — 3079F DIAST BP 80-89 MM HG: CPT | Mod: CPTII,S$GLB,, | Performed by: FAMILY MEDICINE

## 2024-07-08 PROCEDURE — 99999 PR PBB SHADOW E&M-EST. PATIENT-LVL III: CPT | Mod: PBBFAC,,, | Performed by: FAMILY MEDICINE

## 2024-07-08 PROCEDURE — 99214 OFFICE O/P EST MOD 30 MIN: CPT | Mod: S$GLB,,, | Performed by: FAMILY MEDICINE

## 2024-07-08 PROCEDURE — 4010F ACE/ARB THERAPY RXD/TAKEN: CPT | Mod: CPTII,S$GLB,, | Performed by: FAMILY MEDICINE

## 2024-07-08 NOTE — TELEPHONE ENCOUNTER
Attempted to reach patient from the Stroke Core measures report. No answer, LVM with my contact information. Noted that patient does have PT/OT ordered.  RD

## 2024-07-08 NOTE — TELEPHONE ENCOUNTER
Called patients wife to let her know they have an appt with dr spencer and that dr dawkins doesn't have any opening. She did not answer the phone. Left lópez.

## 2024-07-08 NOTE — PROGRESS NOTES
"Subjective:       Patient ID: Tae Nelson Jr. is a 68 y.o. male.    Chief Complaint: Follow-up and Heat Exposure      HPI Comments:       Current Outpatient Medications:     aspirin (ECOTRIN) 81 MG EC tablet, Take 1 tablet (81 mg total) by mouth once daily., Disp: 90 tablet, Rfl: 0    atorvastatin (LIPITOR) 40 MG tablet, Take 1 tablet (40 mg total) by mouth once daily., Disp: 90 tablet, Rfl: 0    cefdinir (OMNICEF) 300 MG capsule, Take 1 capsule (300 mg total) by mouth 2 (two) times daily. for 7 days, Disp: 14 capsule, Rfl: 0    clopidogreL (PLAVIX) 75 mg tablet, Take 1 tablet (75 mg total) by mouth once daily. for 21 days, Disp: 21 tablet, Rfl: 0    losartan (COZAAR) 100 MG tablet, Take 1 tablet (100 mg total) by mouth every evening., Disp: 90 tablet, Rfl: 1    tamsulosin (FLOMAX) 0.4 mg Cap, Take 1 capsule (0.4 mg total) by mouth once daily., Disp: 30 capsule, Rfl: 5      Transitional Care Note    Family and/or Caretaker present at visit?  Yes.  Diagnostic tests reviewed/disposition: No diagnosic tests pending after this hospitalization.  Disease/illness education: CVA  Home health/community services discussion/referrals: Patient does not have home health established from hospital visit.  They do not need home health.  If needed, we will set up home health for the patient.   Establishment or re-establishment of referral orders for community resources:   Discussion with other health care providers:  Neurology consult moved to this week             "68 year-old male with PMH of BPH and Hypertension presented to the ER 7/1/24 with suspected stroke. He was sent by his PCP after he presented there with complaints of left sided weakness, off balance, left sided numbness/tingling and left facial numbness. He reports his symptoms began on Saturday afternoon and when he tried to get out of the vehicle and he felt he was off balance, but he didn't think much of it. He also noticed some numbness in his left arm. On " Sunday, he woke up and his arm felt weaker and he also had tongue numbness with slurred speech. His wife convinced him to go to his PCP where he was sent her for stroke evaluation. He denies any n/v/d, fever, chills, palpitations. But he does report some associated shortness of breathe.      In ER, tele-stroke was consulted, initial CT head no acute finding; atrophy and chronic white matter changes, old right thalamic lacunar infarct seen. CTA h/n with moderate to severe stenosis along the proximal M2 segment of the right MCA. His symptoms have waxed and waned in the ER. Neurology recommended DAPT and to further work up and treat here unless his symptoms worsen then he may need to transfer to main campus for higher level of care.   On exam, patient with some mild slurred speech, AAO, wife and daughter at bedside. NIHSS 5 on exam. [facility] consulted for hospital admission for ischemic stroke workup.         Hospital Course:   69 y/o male admitted with CVA, MRI brain confirmed small lacunar infarct in right periventricular region posteriorly. He was loaded on ASA/Plavix and continued maintenance dose daily. Started on statin daily.   Neurology consulted in ER.   TTE LVEF 55-60%, normal systolic/diastolic function, PA pressure 15 mmHg, negative bubble study. EKG Sinus bradycardia with 1st degree AV block.   PT/OT recommended low intensity therapy and rolling walker. RW delivered to bedside prior to discharge.      Educated patient on low sodium/heart healthy diet, printed educational material and given to patient on discharge.   Continue taking ASA/Plavix x 21 days, then monotherapy with ASA. Cont statin therapy.   All prescriptions sent to his pharmacy on file.   UA shows UTI, culture growing GNR. Started on Rocephin IV inpatient, will send out on PO cefdinir x 7 days.      Referral sent to neurology for post op follow up for stroke.   Referral to outpatient therapy for PT/OT.   Follow up with PCP in 3-5 days for hospital  "follow up."    Has done well since discharge.  Starts physical therapy tomorrow.  Already has improved strength in his left arm.  Left side of face feels better as well.    Now on aspirin and Plavix, statin    Neurology consult was able to be moved from next January to later this week    Completing antibiotics for UTI        Review of Systems   Constitutional:  Negative for activity change, appetite change and fever.   HENT:  Negative for sore throat.    Respiratory:  Negative for cough and shortness of breath.    Cardiovascular:  Negative for chest pain.   Gastrointestinal:  Negative for abdominal pain, diarrhea and nausea.   Genitourinary:  Negative for difficulty urinating.   Musculoskeletal:  Negative for arthralgias and myalgias.   Neurological:  Positive for weakness. Negative for dizziness and headaches.       Objective:      Vitals:    07/08/24 0940   BP: 122/84   Pulse: 62   Temp: 96.4 °F (35.8 °C)   TempSrc: Tympanic   SpO2: 97%   Weight: 87.8 kg (193 lb 9 oz)   Height: 6' (1.829 m)   PainSc: 0-No pain     Physical Exam  Vitals and nursing note reviewed.   Constitutional:       General: He is not in acute distress.     Appearance: He is well-developed. He is not diaphoretic.   HENT:      Head: Normocephalic.      Mouth/Throat:      Pharynx: No oropharyngeal exudate.   Neck:      Thyroid: No thyromegaly.   Cardiovascular:      Rate and Rhythm: Normal rate and regular rhythm.      Heart sounds: Normal heart sounds. No murmur heard.  Pulmonary:      Effort: Pulmonary effort is normal.      Breath sounds: Normal breath sounds. No wheezing or rales.   Abdominal:      General: There is no distension.      Palpations: Abdomen is soft.   Musculoskeletal:      Cervical back: Neck supple.   Lymphadenopathy:      Cervical: No cervical adenopathy.   Skin:     General: Skin is warm and dry.   Neurological:      Mental Status: He is alert and oriented to person, place, and time.      Cranial Nerves: Facial asymmetry " present.      Motor: Weakness and pronator drift present.      Comments: Very slight drift on left arm  Very slight facial asymmetry   Psychiatric:         Behavior: Behavior normal.         Thought Content: Thought content normal.         Judgment: Judgment normal.         Assessment:       1. Cerebrovascular accident (CVA) due to thrombosis of right middle cerebral artery    2. Aortic atherosclerosis    3. Primary hypertension    4. Acute cystitis without hematuria    5. Arthritis of right knee        Plan:   Cerebrovascular accident (CVA) due to thrombosis of right middle cerebral artery  Comments:  Minimal neurologic deficits.  Neurology consult this week.  Physical therapy.  Aspirin and Plavix and statin  Orders:  -     Ambulatory referral/consult to Neurology; Future; Expected date: 07/15/2024    Aortic atherosclerosis  Comments:  On aspirin, Plavix, statin    Primary hypertension  Comments:  Controlled.  Follow-up 3 months    Acute cystitis without hematuria  Comments:  Completing antibiotics    Arthritis of right knee  Comments:  Recommend postponing planned surgery 6 weeks.

## 2024-07-08 NOTE — TELEPHONE ENCOUNTER
----- Message from Carol Rai sent at 7/8/2024 12:33 PM CDT -----  Contact: wife Crow   Wife Crow would sarina a call back to schedule an appt for Tae with Dr Easley

## 2024-07-09 ENCOUNTER — CLINICAL SUPPORT (OUTPATIENT)
Dept: REHABILITATION | Facility: HOSPITAL | Age: 69
End: 2024-07-09
Payer: MEDICARE

## 2024-07-09 ENCOUNTER — PATIENT OUTREACH (OUTPATIENT)
Dept: ADMINISTRATIVE | Facility: CLINIC | Age: 69
End: 2024-07-09
Payer: MEDICARE

## 2024-07-09 DIAGNOSIS — M17.11 PRIMARY OSTEOARTHRITIS OF RIGHT KNEE: ICD-10-CM

## 2024-07-09 DIAGNOSIS — I10 PRIMARY HYPERTENSION: ICD-10-CM

## 2024-07-09 DIAGNOSIS — I63.311 CEREBROVASCULAR ACCIDENT (CVA) DUE TO THROMBOSIS OF RIGHT MIDDLE CEREBRAL ARTERY: ICD-10-CM

## 2024-07-09 PROCEDURE — 97161 PT EVAL LOW COMPLEX 20 MIN: CPT | Mod: PN

## 2024-07-09 NOTE — PROGRESS NOTES
[facility]   Physical Therapy Initial Evaluation      Date: 7/9/2024     Name: Tae Nelson Jr.  Clinic Number: [id number]  Therapy Diagnosis: Left side weakness  Physician: Radha Fernandes NP      Physician Orders: PT Eval and Treat  Medical Diagnosis from Referral:   I63.311 (ICD-10-CM) - Cerebrovascular accident (CVA) due to thrombosis of right middle cerebral artery   I10 (ICD-10-CM) - Primary hypertension   M17.11 (ICD-10-CM) - Primary osteoarthritis of right knee     Evaluation Date: 7/9/2024  Authorization Period Expiration: 07/01/2025  Plan of Care Expiration: 09/07/2024    Progress Update: 08/08/2024   Visit # / Visits authorized: 1 / 1   FOTO: 7/9/2024 - Scored: 1 / 3       PRECAUTIONS: Standard Precautions and Cardiac/Vascular: CVA Jul1, 2024       Time In: 0730  Time Out: 0810  Total Appointment Time (timed & untimed codes): 40 minutes    SUBJECTIVE     Date of onset: 7/1/2024  Chief Complaint: right CVA    History of current condition - Tae is a 68 y.o. male who presents to physical therapy reporting he initially had increased weakness and little control over the left hand.      Falls: [x] No  [] Yes:       Imaging: [] Xray [] MRI [] CT: Reviewed    Prior Therapy:  [x] No  [] Yes:   Social History: Pt lives with their spouse [x] House [] Apartment/Condo []other  Stairs: [x] No  [] Yes:   Occupation: None  Prior Level of Function: Independent with all activities of daily living  Current Level of Function: The patient is performing at near at near PLOF    Pain:  Current 0/10    Pts goals: Pt reported goals are to improve pain and function    _______________________________________________________  Medical History:   Past Medical History:   Diagnosis Date    Arthritis     BPH (benign prostatic hyperplasia)     Hypertension        Surgical History:   Tae Nelson Jr.  has a past surgical history that includes Shoulder surgery (Right).    Medications:   Tae  has a current medication list which includes the following prescription(s): aspirin, atorvastatin, cefdinir, clopidogrel, losartan, and tamsulosin.    Allergies:   Review of patient's allergies indicates:  No Known Allergies       OBJECTIVE     Sensation:  Sensation is [x] Intact [] Impaired-- to light touch    Upper extremity AROM is WNL/WFL bilaterally    Upper and Lower extremity strength is grossly equal bilaterally.  He has slight decrease in  strength:  105 left and 120 right with hand dynometer.    Gait is independent without a device    Transfers are independent    Balance  mCTSIB 4/4     Evaluation Reassessment Reassessment   Timed Up and Go NT     Self Selected Walking Speed NT     30 Second Chair Rise   15           Table: Population Norms for TUG    Age  Average TUG    60 - 69 years  8.1 seconds    70 - 79 years  9.2 seconds    80 - 99 years  11.3 seconds      Table: Normative Data 30 Second Chair Rise (by gender & age)      ASSESSMENT     The patient has return of function after CVA and reports of continued improvement.  He performed well with balance assessment and is independent in gait and transfers.  He has no observable gait deviation and does not require an assistive device.  The patient does not require PT this time.    Plan of care discussed with patient: Yes  Pt's spiritual, cultural and educational needs considered and patient is agreeable to the plan of care and goals as stated below:     Anticipated Barriers for therapy: none    Medical Necessity is demonstrated by the following:     History  Co-morbidities and personal factors that may impact the plan of care [x] LOW: no personal factors / co-morbidities  [] MODERATE: 1-2 personal factors / co-morbidities  [] HIGH: 3+ personal factors / co-morbidities    Moderate / High Support Documentation:   Co-morbidities affecting plan of care:   Past Medical History:   Diagnosis Date    Arthritis     BPH (benign prostatic hyperplasia)      Hypertension        Personal Factors:   no deficits     Examination  Body Structures and Functions, activity limitations and participation restrictions that may impact the plan of care [x] LOW: addressing 1-2 elements  [] MODERATE: 3+ elements  [] HIGH: 4+ elements (please support below)    Moderate / High Support Documentation:   [] Head / Neck  [] Spine  [] Upper Quarter  [] Lower Quarter  [] Range of motion Deficits  [] Gross Symmetry Deficits  [] Strength Deficits  [] Balance Deficits  [] Gait Deficits  [] Unable to participate in daily activities  [] Unable to perform functional tasks  [] Unable to Care for Self or others  [] Community/ Social Life changes due to impairments     Clinical Presentation [x] LOW: stable  [] MODERATE: Evolving  [] HIGH: Unstable     Decision Making/ Complexity Score: low           PLAN   Discharge Physical Therapy    Thank you for this referral.    Mich Buchanan, PT

## 2024-07-09 NOTE — PROGRESS NOTES
C3 nurse attempted to contact Tae Nelson Jr. for a TCC post hospital discharge follow up call. No answer. Left voicemail with callback information. The patient has a scheduled HOSFU appointment with Orion Santiago MD on 07/08/24 @ 2235 - completed.

## 2024-07-10 ENCOUNTER — PATIENT MESSAGE (OUTPATIENT)
Dept: ADMINISTRATIVE | Facility: CLINIC | Age: 69
End: 2024-07-10
Payer: MEDICARE

## 2024-07-10 NOTE — PROGRESS NOTES
2nd Attempt made to reach patient for TCC call. The patient is unable to conduct the call @ this time. The answerer requested a callback.

## 2024-07-10 NOTE — PROGRESS NOTES
3rd Attempt made to reach patient for TCC call. The patient is unable to conduct the call @ this time. T    Portal message sent, pt josh

## 2024-07-19 RX ORDER — TAMSULOSIN HYDROCHLORIDE 0.4 MG/1
1 CAPSULE ORAL
Qty: 30 CAPSULE | Refills: 0 | Status: SHIPPED | OUTPATIENT
Start: 2024-07-19 | End: 2024-07-22 | Stop reason: SDUPTHER

## 2024-07-19 RX ORDER — LOSARTAN POTASSIUM 100 MG/1
100 TABLET ORAL NIGHTLY
Qty: 90 TABLET | Refills: 3 | Status: SHIPPED | OUTPATIENT
Start: 2024-07-19

## 2024-07-19 NOTE — TELEPHONE ENCOUNTER
No care due was identified.  [facility] Embedded Care Due Messages. Reference number: [id number].   7/19/2024 11:21:23 AM CDT

## 2024-07-19 NOTE — TELEPHONE ENCOUNTER
Refill Decision Note   Tae Nelson  is requesting a refill authorization.  Brief Assessment and Rationale for Refill:  Approve     Medication Therapy Plan:         Comments:     Note composed:12:14 PM 07/19/2024

## 2024-07-22 ENCOUNTER — LAB VISIT (OUTPATIENT)
Dept: LAB | Facility: HOSPITAL | Age: 69
End: 2024-07-22
Attending: UROLOGY
Payer: MEDICARE

## 2024-07-22 ENCOUNTER — OFFICE VISIT (OUTPATIENT)
Dept: UROLOGY | Facility: CLINIC | Age: 69
End: 2024-07-22
Payer: MEDICARE

## 2024-07-22 VITALS
SYSTOLIC BLOOD PRESSURE: 135 MMHG | WEIGHT: 182 LBS | BODY MASS INDEX: 24.65 KG/M2 | HEART RATE: 56 BPM | HEIGHT: 72 IN | DIASTOLIC BLOOD PRESSURE: 81 MMHG

## 2024-07-22 DIAGNOSIS — R97.20 ELEVATED PSA: ICD-10-CM

## 2024-07-22 DIAGNOSIS — R35.1 BENIGN PROSTATIC HYPERPLASIA WITH NOCTURIA: ICD-10-CM

## 2024-07-22 DIAGNOSIS — R97.20 ELEVATED PSA: Primary | ICD-10-CM

## 2024-07-22 DIAGNOSIS — N40.1 BENIGN PROSTATIC HYPERPLASIA WITH NOCTURIA: ICD-10-CM

## 2024-07-22 LAB — COMPLEXED PSA SERPL-MCNC: 4.6 NG/ML (ref 0–4)

## 2024-07-22 PROCEDURE — 3075F SYST BP GE 130 - 139MM HG: CPT | Mod: CPTII,S$GLB,, | Performed by: UROLOGY

## 2024-07-22 PROCEDURE — 3044F HG A1C LEVEL LT 7.0%: CPT | Mod: CPTII,S$GLB,, | Performed by: UROLOGY

## 2024-07-22 PROCEDURE — 84153 ASSAY OF PSA TOTAL: CPT | Performed by: UROLOGY

## 2024-07-22 PROCEDURE — 3079F DIAST BP 80-89 MM HG: CPT | Mod: CPTII,S$GLB,, | Performed by: UROLOGY

## 2024-07-22 PROCEDURE — 4010F ACE/ARB THERAPY RXD/TAKEN: CPT | Mod: CPTII,S$GLB,, | Performed by: UROLOGY

## 2024-07-22 PROCEDURE — 3288F FALL RISK ASSESSMENT DOCD: CPT | Mod: CPTII,S$GLB,, | Performed by: UROLOGY

## 2024-07-22 PROCEDURE — 36415 COLL VENOUS BLD VENIPUNCTURE: CPT | Performed by: UROLOGY

## 2024-07-22 PROCEDURE — 3008F BODY MASS INDEX DOCD: CPT | Mod: CPTII,S$GLB,, | Performed by: UROLOGY

## 2024-07-22 PROCEDURE — 1126F AMNT PAIN NOTED NONE PRSNT: CPT | Mod: CPTII,S$GLB,, | Performed by: UROLOGY

## 2024-07-22 PROCEDURE — 1101F PT FALLS ASSESS-DOCD LE1/YR: CPT | Mod: CPTII,S$GLB,, | Performed by: UROLOGY

## 2024-07-22 PROCEDURE — 99999 PR PBB SHADOW E&M-EST. PATIENT-LVL III: CPT | Mod: PBBFAC,,, | Performed by: UROLOGY

## 2024-07-22 PROCEDURE — 1159F MED LIST DOCD IN RCRD: CPT | Mod: CPTII,S$GLB,, | Performed by: UROLOGY

## 2024-07-22 PROCEDURE — 99214 OFFICE O/P EST MOD 30 MIN: CPT | Mod: S$GLB,,, | Performed by: UROLOGY

## 2024-07-22 RX ORDER — TAMSULOSIN HYDROCHLORIDE 0.4 MG/1
0.4 CAPSULE ORAL DAILY
Qty: 30 CAPSULE | Refills: 5 | Status: SHIPPED | OUTPATIENT
Start: 2024-07-22

## 2024-07-22 RX ORDER — MIRABEGRON 50 MG/1
50 TABLET, EXTENDED RELEASE ORAL DAILY
Qty: 30 TABLET | Refills: 5 | Status: SHIPPED | OUTPATIENT
Start: 2024-07-22 | End: 2025-07-22

## 2024-07-22 RX ORDER — FINASTERIDE 5 MG/1
5 TABLET, FILM COATED ORAL DAILY
Qty: 30 TABLET | Refills: 5 | Status: SHIPPED | OUTPATIENT
Start: 2024-07-22 | End: 2025-01-18

## 2024-07-22 NOTE — PROGRESS NOTES
Chief Complaint:  Follow up elevated PSA    HPI:  SILVIANO Nelson Jr. edmond 68 y.o. male who presents with follow up to elevated PSA.  He does have a history of prostate biopsy in the past.  He also has a history of PI-RADS 2 80 g gland by MRI.  His PSA has been stable but elevated.  He was on tamsulosin for lower urinary tract symptoms.  He has noticed more urinary urgency and urge incontinence recently.  He has also had another UTI that was treated recently in July.  He did have a mild stroke in July as well.  He is now on Plavix.    History:  Social History     Tobacco Use    Smoking status: Never     Passive exposure: Never    Smokeless tobacco: Never    Tobacco comments:     Smokes weed   Substance Use Topics    Alcohol use: Never    Drug use: Yes     Types: Marijuana     Past Medical History:   Diagnosis Date    Arthritis     BPH (benign prostatic hyperplasia)     Hypertension      Past Surgical History:   Procedure Laterality Date    SHOULDER SURGERY Right      Family History   Problem Relation Name Age of Onset    Prostate cancer Father      Cancer Mother      No Known Problems Maternal Aunt      No Known Problems Maternal Uncle      No Known Problems Paternal Aunt      No Known Problems Paternal Uncle      No Known Problems Paternal Grandmother      No Known Problems Paternal Grandfather      No Known Problems Maternal Grandmother      No Known Problems Maternal Grandfather      No Known Problems Other         Current Outpatient Medications on File Prior to Visit   Medication Sig Dispense Refill    aspirin (ECOTRIN) 81 MG EC tablet Take 1 tablet (81 mg total) by mouth once daily. 90 tablet 0    atorvastatin (LIPITOR) 40 MG tablet Take 1 tablet (40 mg total) by mouth once daily. 90 tablet 0    clopidogreL (PLAVIX) 75 mg tablet Take 1 tablet (75 mg total) by mouth once daily. for 21 days 21 tablet 0    losartan (COZAAR) 100 MG tablet Take 1 tablet (100 mg total) by mouth every evening. 90 tablet 3     [DISCONTINUED] tamsulosin (FLOMAX) 0.4 mg Cap TAKE 1 CAPSULE BY MOUTH EVERY DAY 30 capsule 0     No current facility-administered medications on file prior to visit.        Objective:     Vitals:    07/22/24 1109   BP: 135/81   BP Location: Left arm   Patient Position: Sitting   Pulse: (!) 56   Weight: 82.6 kg (182 lb)   Height: 6' (1.829 m)      BMI Readings from Last 1 Encounters:   07/22/24 24.68 kg/m²          Physical Exam  No acute distress alert and oriented   Respirations even unlabored   Abdomen is soft nontender    Lab Results   Component Value Date    PSA 4.6 (H) 12/12/2023        Lab Results   Component Value Date    CREATININE 1.0 07/03/2024      Assessment:       1. Elevated PSA    2. Benign prostatic hyperplasia with nocturia        Plan:     1. Elevated PSA    2. Benign prostatic hyperplasia with nocturia       Orders Placed This Encounter    Prostate Specific Antigen, Diagnostic    mirabegron (MYRBETRIQ) 50 mg Tb24    tamsulosin (FLOMAX) 0.4 mg Cap    finasteride (PROSCAR) 5 mg tablet      Elevated PSA.  We will rechecked today.  Reassured that PSA is enlarged due to large prostate size.  His prostate health index in the past was low.  Recommend starting finasteride due to recurrent UTIs and now need for Plavix.  This will hopefully prevent problems in the future.  Continue tamsulosin.  Patient was also having urgency and urge incontinence.  We will start him on mirabegron 50 mg.  Discussed side effects.  Re-evaluate in 6 months.

## 2024-08-08 ENCOUNTER — TELEPHONE (OUTPATIENT)
Dept: NEUROLOGY | Facility: CLINIC | Age: 69
End: 2024-08-08
Payer: MEDICARE

## 2024-08-14 ENCOUNTER — HOSPITAL ENCOUNTER (OUTPATIENT)
Dept: RADIOLOGY | Facility: HOSPITAL | Age: 69
Discharge: HOME | End: 2024-08-14
Attending: FAMILY MEDICINE
Payer: MEDICARE

## 2024-08-14 ENCOUNTER — OFFICE VISIT (OUTPATIENT)
Dept: FAMILY MEDICINE | Facility: CLINIC | Age: 69
End: 2024-08-14
Payer: MEDICARE

## 2024-08-14 VITALS
OXYGEN SATURATION: 96 % | HEART RATE: 71 BPM | TEMPERATURE: 97 F | DIASTOLIC BLOOD PRESSURE: 64 MMHG | BODY MASS INDEX: 25.96 KG/M2 | SYSTOLIC BLOOD PRESSURE: 112 MMHG | WEIGHT: 191.69 LBS | HEIGHT: 72 IN

## 2024-08-14 DIAGNOSIS — G89.29 CHRONIC NECK PAIN: Primary | ICD-10-CM

## 2024-08-14 DIAGNOSIS — M17.11 PRIMARY OSTEOARTHRITIS OF RIGHT KNEE: ICD-10-CM

## 2024-08-14 DIAGNOSIS — I10 PRIMARY HYPERTENSION: ICD-10-CM

## 2024-08-14 DIAGNOSIS — I63.311 CEREBROVASCULAR ACCIDENT (CVA) DUE TO THROMBOSIS OF RIGHT MIDDLE CEREBRAL ARTERY: ICD-10-CM

## 2024-08-14 DIAGNOSIS — R97.20 ELEVATED PSA: ICD-10-CM

## 2024-08-14 DIAGNOSIS — N40.0 BENIGN PROSTATIC HYPERPLASIA WITHOUT LOWER URINARY TRACT SYMPTOMS: ICD-10-CM

## 2024-08-14 DIAGNOSIS — G89.29 CHRONIC NECK PAIN: ICD-10-CM

## 2024-08-14 DIAGNOSIS — M54.2 CHRONIC NECK PAIN: ICD-10-CM

## 2024-08-14 DIAGNOSIS — M54.2 CHRONIC NECK PAIN: Primary | ICD-10-CM

## 2024-08-14 PROCEDURE — 4010F ACE/ARB THERAPY RXD/TAKEN: CPT | Mod: CPTII,S$GLB,, | Performed by: FAMILY MEDICINE

## 2024-08-14 PROCEDURE — 99999 PR PBB SHADOW E&M-EST. PATIENT-LVL IV: CPT | Mod: PBBFAC,,, | Performed by: FAMILY MEDICINE

## 2024-08-14 PROCEDURE — 3288F FALL RISK ASSESSMENT DOCD: CPT | Mod: CPTII,S$GLB,, | Performed by: FAMILY MEDICINE

## 2024-08-14 PROCEDURE — 99214 OFFICE O/P EST MOD 30 MIN: CPT | Mod: S$GLB,,, | Performed by: FAMILY MEDICINE

## 2024-08-14 PROCEDURE — 72040 X-RAY EXAM NECK SPINE 2-3 VW: CPT | Mod: TC,PO

## 2024-08-14 PROCEDURE — 3008F BODY MASS INDEX DOCD: CPT | Mod: CPTII,S$GLB,, | Performed by: FAMILY MEDICINE

## 2024-08-14 PROCEDURE — 3044F HG A1C LEVEL LT 7.0%: CPT | Mod: CPTII,S$GLB,, | Performed by: FAMILY MEDICINE

## 2024-08-14 PROCEDURE — G2211 COMPLEX E/M VISIT ADD ON: HCPCS | Mod: S$GLB,,, | Performed by: FAMILY MEDICINE

## 2024-08-14 PROCEDURE — 1126F AMNT PAIN NOTED NONE PRSNT: CPT | Mod: CPTII,S$GLB,, | Performed by: FAMILY MEDICINE

## 2024-08-14 PROCEDURE — 1159F MED LIST DOCD IN RCRD: CPT | Mod: CPTII,S$GLB,, | Performed by: FAMILY MEDICINE

## 2024-08-14 PROCEDURE — 3078F DIAST BP <80 MM HG: CPT | Mod: CPTII,S$GLB,, | Performed by: FAMILY MEDICINE

## 2024-08-14 PROCEDURE — 3074F SYST BP LT 130 MM HG: CPT | Mod: CPTII,S$GLB,, | Performed by: FAMILY MEDICINE

## 2024-08-14 PROCEDURE — 1101F PT FALLS ASSESS-DOCD LE1/YR: CPT | Mod: CPTII,S$GLB,, | Performed by: FAMILY MEDICINE

## 2024-08-14 PROCEDURE — 72040 X-RAY EXAM NECK SPINE 2-3 VW: CPT | Mod: 26,,, | Performed by: RADIOLOGY

## 2024-08-14 NOTE — PROGRESS NOTES
Subjective:       Patient ID: Tae Nelson Jr. is a 68 y.o. male.    Chief Complaint: Spasms and Medication Refill      HPI Comments:       Current Outpatient Medications:     aspirin (ECOTRIN) 81 MG EC tablet, Take 1 tablet (81 mg total) by mouth once daily., Disp: 90 tablet, Rfl: 0    atorvastatin (LIPITOR) 40 MG tablet, Take 1 tablet (40 mg total) by mouth once daily., Disp: 90 tablet, Rfl: 0    finasteride (PROSCAR) 5 mg tablet, Take 1 tablet (5 mg total) by mouth once daily., Disp: 30 tablet, Rfl: 5    losartan (COZAAR) 100 MG tablet, Take 1 tablet (100 mg total) by mouth every evening., Disp: 90 tablet, Rfl: 3    mirabegron (MYRBETRIQ) 50 mg Tb24, Take 1 tablet (50 mg total) by mouth Daily., Disp: 30 tablet, Rfl: 5    tamsulosin (FLOMAX) 0.4 mg Cap, Take 1 capsule (0.4 mg total) by mouth once daily., Disp: 30 capsule, Rfl: 5    clopidogreL (PLAVIX) 75 mg tablet, Take 1 tablet (75 mg total) by mouth once daily. for 21 days, Disp: 21 tablet, Rfl: 0      One-month follow-up.      Complains of a three-week history of occipital neck pain.  Left-sided.  Very small area.  Mainly bothers him when he looks up or to the left.  Some relief with heat and pain patches.  No injury.  No previous problems here.    Sees neurology tomorrow.  Completed his physical therapy for the stroke    No plans yet for postponed knee surgery.      Review of Systems   Constitutional:  Negative for activity change, appetite change and fever.   HENT:  Negative for sore throat.    Respiratory:  Negative for cough and shortness of breath.    Cardiovascular:  Negative for chest pain.   Gastrointestinal:  Negative for abdominal pain, diarrhea and nausea.   Genitourinary:  Negative for difficulty urinating.   Musculoskeletal:  Positive for neck pain. Negative for arthralgias and myalgias.   Neurological:  Positive for speech difficulty. Negative for dizziness and headaches.       Objective:      Vitals:    08/14/24 1319   BP: 112/64   Pulse: 71    Temp: 97.2 °F (36.2 °C)   TempSrc: Tympanic   SpO2: 96%   Weight: 87 kg (191 lb 11 oz)   Height: 6' (1.829 m)   PainSc: 0-No pain     Physical Exam  Vitals and nursing note reviewed.   Constitutional:       General: He is not in acute distress.     Appearance: He is well-developed. He is not diaphoretic.   HENT:      Head: Normocephalic.   Neck:      Thyroid: No thyromegaly.        Comments: Area of complaint and tenderness.  Cardiovascular:      Rate and Rhythm: Normal rate and regular rhythm.      Heart sounds: Normal heart sounds. No murmur heard.  Pulmonary:      Effort: Pulmonary effort is normal.      Breath sounds: Normal breath sounds. No wheezing or rales.   Abdominal:      General: There is no distension.      Palpations: Abdomen is soft.   Musculoskeletal:      Cervical back: Neck supple. No rigidity. Pain with movement and muscular tenderness present. No spinous process tenderness. Decreased range of motion.   Lymphadenopathy:      Cervical: No cervical adenopathy.   Skin:     General: Skin is warm and dry.   Neurological:      Mental Status: He is alert and oriented to person, place, and time.   Psychiatric:         Behavior: Behavior normal.         Thought Content: Thought content normal.         Judgment: Judgment normal.         Assessment:       1. Chronic neck pain    2. Cerebrovascular accident (CVA) due to thrombosis of right middle cerebral artery    3. Primary hypertension    4. Benign prostatic hyperplasia without lower urinary tract symptoms    5. Elevated PSA    6. Primary osteoarthritis of right knee        Plan:   Chronic neck pain  Comments:  X-ray today.  Physical therapy.  Orders:  -     X-Ray Cervical Spine AP And Lateral; Future; Expected date: 08/14/2024  -     Ambulatory referral/consult to Physical/Occupational Therapy; Future; Expected date: 08/21/2024    Cerebrovascular accident (CVA) due to thrombosis of right middle cerebral artery  Comments:  Good response physical therapy.   Still some speech difficulty from time to time    Primary hypertension  Comments:  Controlled.  Follow-up October as planned    Benign prostatic hyperplasia without lower urinary tract symptoms  Comments:  Now on Flomax and Proscar    Elevated PSA  Comments:  Followed by urology.  Now on Myrbetriq, Flomax, Proscar    Primary osteoarthritis of right knee  Comments:  Plans to have knee replacement soon

## 2024-08-15 ENCOUNTER — OFFICE VISIT (OUTPATIENT)
Dept: NEUROLOGY | Facility: CLINIC | Age: 69
End: 2024-08-15
Payer: MEDICARE

## 2024-08-15 VITALS
BODY MASS INDEX: 26.19 KG/M2 | RESPIRATION RATE: 16 BRPM | DIASTOLIC BLOOD PRESSURE: 78 MMHG | SYSTOLIC BLOOD PRESSURE: 157 MMHG | HEART RATE: 75 BPM | WEIGHT: 193.13 LBS

## 2024-08-15 DIAGNOSIS — I10 PRIMARY HYPERTENSION: ICD-10-CM

## 2024-08-15 DIAGNOSIS — M17.11 PRIMARY OSTEOARTHRITIS OF RIGHT KNEE: ICD-10-CM

## 2024-08-15 DIAGNOSIS — I63.311 CEREBROVASCULAR ACCIDENT (CVA) DUE TO THROMBOSIS OF RIGHT MIDDLE CEREBRAL ARTERY: ICD-10-CM

## 2024-08-15 PROBLEM — N30.00 ACUTE CYSTITIS WITHOUT HEMATURIA: Status: RESOLVED | Noted: 2024-07-03 | Resolved: 2024-08-15

## 2024-08-15 PROCEDURE — 99999 PR PBB SHADOW E&M-EST. PATIENT-LVL IV: CPT | Mod: PBBFAC,,, | Performed by: NURSE PRACTITIONER

## 2024-08-15 RX ORDER — CLOPIDOGREL BISULFATE 75 MG/1
75 TABLET ORAL DAILY
Qty: 90 TABLET | Refills: 0 | Status: SHIPPED | OUTPATIENT
Start: 2024-08-15 | End: 2024-11-13

## 2024-08-15 RX ORDER — ASPIRIN 81 MG/1
81 TABLET ORAL DAILY
Qty: 90 TABLET | Refills: 3 | Status: SHIPPED | OUTPATIENT
Start: 2024-08-15 | End: 2025-08-15

## 2024-08-15 NOTE — PATIENT INSTRUCTIONS
"Given the stroke and the focal areas of narrowing in your blood vessels in the brain, we will change your medications to prevent stroke a bit.     You will stay on aspirin 81 mg daily for life.   We will resume the clopidogrel or Plavix 75 mg daily for a period of 90 days --- when this runs out, you can stop it and just use the aspirin     You should continue taking the cholesterol medication (atrovastatin / Lipitor). Your LDL cholesterol was 111. The goal now is for it to be < 70. Your PCP can monitor this for you.     Try to improve your diet as discussed -- less fried / processed foods -- the MIND diet is a good lifestyle change to look into     We want to see the BP in normal range, less than 130/80.     Any time that antiplatelets or blood thinning agents are held after a stroke, there is a risk for recurrence. There is strong evidence to suggest that the highest risk for recurrence of stroke or TIA is within the first 9 months after the initial event. Any non-urgent procedure should, therefore, be postponed until at least 9 months after a neurological event for the best outcomes. If the procedure is felt to be urgent, the risk to benefit ratio should be carefully considered between the patient and provider performing the procedure. Finally, if antiplatelet or blood thinning agents are to be held, it should be for the shortest duration possible.         STROKE EDUCATION:    During a stroke, blood stops flowing to part of the brain. This can damage areas in the brain that control the rest of the body. Symptoms after a stroke depend on which part of the brain has been affected. A TIA is often called a "mini stroke" and can be a warning sign for future strokes.     Stroke Risk Factors:  - Previous stroke  - High blood pressure  - High cholesterol  - Cigarette/cigar smoking  - Diabetes  - Carotid or other artery disease  - Atrial fibrillation/flutter  - Obesity  - Blood clotting disorders  - Excessive alcohol " use  - Street drug use  - Family history of stroke    Call 911 right away if you have any of the following symptoms of stroke:  Weakness, tingling, or loss of feeling on one side of your face or body  Sudden double vision or trouble seeing in one or both eyes  Sudden trouble talking or slurred speech  Trouble understanding others  Sudden, severe headache  Dizziness, loss of balance, or a sense of falling  Blackouts or seizures     F.A.S.T. is an easy way to remember the signs of stroke. When you see these signs, you know that you need to call 911 FAST!   F is for face drooping. One side of the face is drooping or numb. When the person smiles, the smile is uneven.   A is for arm weakness. One arm is weak or numb. When the person lifts both arms at the same time, one arm may drift downward.   S is for speech difficulty. You may notice slurred speech or trouble speaking. The person can't repeat a simple sentence correctly when asked.   T is for time to call 911. If someone shows any of these symptoms, even if they go away, call 911 immediately. Make note of the time the symptoms first appeared.      Follow up with me in about 6 months  Please call our clinic at [phone] or send a message on the [facility] portal if there are any changes to the plan discussed today. For example, if you are not contacted for the requested tests, referral(s) within one week, if you are unable to receive the medications prescribed, or if you feel you need to change the treatment course for any reason.

## 2024-08-15 NOTE — PROGRESS NOTES
NEUROLOGY  Outpatient Consultation Visit     [facility]  [address] [facility], [place] [address]  [phone] (office) / [phone] (fax)    Patient Name:  Tae Nelson Jr.  :  1955  MR #:  [id number]  Acct #:  [id number]    Date of  Visit: 08/15/2024    Other Physicians:  Orion Santiago MD (Primary Care Physician)      CHIEF COMPLAINT: Cerebrovascular Accident        HISTORY OF PRESENT ILLNESS:  Tae Nelson Jr. is a 68 y.o. R-handed male seen in hospital follow up for CVA per Radha Fernandes NP    Medical history is significant for BPH, HTN, R knee arthritis     Here with his wife.     Presented to ED on  with L sided numbness and weakness over the course of 2 days. He also noted facial weakness and slurred speech.     Non smoker. No ETOH use. /66 per ED note. Imaging showed area of DR in the R PV region. CTA significant for moderate to severe R M2 stenosis, mild L vertebral stenosis. Treated with DAPT, statin.     Still notes some dysarthria, but very mild. Wife notes that he talks like he has food in his mouth, but overall much better. Some residual weakness in the L arm. Went to outpatient PT for evaluation and now doing some home exercises. Wants to get back into the gym. No issues in the leg. No residual sensory change. No recurrent symptoms.     Presently not taking any AP agents. Ran out of the Plavix. Not sure why he isn't taking aspirin.     No prior hx of CVA or TIA. Was not on any AP agents at baseline. Has been very healthy overall. No hx of DM. No prior hx of HLD. Now on a statin, LDL was 111 at the time of the stroke. Does admit that he eats a good bit of fried chicken. He also eats a lot of sausage, but mostly venison.     Having some pain in the L neck just behind the ear. Worse when he extends his neck, takes a big bite of something or turns head to the L. PCP took neck xray yesterday, told him that he has arthritis and ordered PT.     Was  scheduled for knee replacement on 7/15, now planning for this in September.     Allergies:  Review of patient's allergies indicates:  No Known Allergies    Current Medications:  Current Outpatient Medications   Medication Sig Dispense Refill    atorvastatin (LIPITOR) 40 MG tablet Take 1 tablet (40 mg total) by mouth once daily. 90 tablet 0    finasteride (PROSCAR) 5 mg tablet Take 1 tablet (5 mg total) by mouth once daily. 30 tablet 5    losartan (COZAAR) 100 MG tablet Take 1 tablet (100 mg total) by mouth every evening. 90 tablet 3    mirabegron (MYRBETRIQ) 50 mg Tb24 Take 1 tablet (50 mg total) by mouth Daily. 30 tablet 5    tamsulosin (FLOMAX) 0.4 mg Cap Take 1 capsule (0.4 mg total) by mouth once daily. 30 capsule 5    aspirin (ECOTRIN) 81 MG EC tablet Take 1 tablet (81 mg total) by mouth once daily. 90 tablet 3    clopidogreL (PLAVIX) 75 mg tablet Take 1 tablet (75 mg total) by mouth once daily. 90 tablet 0     No current facility-administered medications for this visit.       Past Medical History:  Past Medical History:   Diagnosis Date    Arthritis     BPH (benign prostatic hyperplasia)     Hypertension        Past Surgical History:  Past Surgical History:   Procedure Laterality Date    SHOULDER SURGERY Right        Family History:  family history includes Cancer in his mother; No Known Problems in his maternal aunt, maternal grandfather, maternal grandmother, maternal uncle, paternal aunt, paternal grandfather, paternal grandmother, paternal uncle, and another family member; Prostate cancer in his father.    Social History:   reports that he has never smoked. He has never been exposed to tobacco smoke. He has never used smokeless tobacco. He reports current drug use. Drug: Marijuana. He reports that he does not drink alcohol.      REVIEW OF SYSTEMS:  As per HPI    PHYSICAL EXAM:  BP (!) 157/78 (BP Location: Right arm, Patient Position: Sitting)   Pulse 75   Resp 16   Wt 87.6 kg (193 lb 2 oz)   BMI 26.19  kg/m²     General: Well groomed. No acute distress.  Pulmonary: Normal effort and rate.   Musculoskeletal: No obvious joint deformities, moves all extremities well.  Extremities: No clubbing, cyanosis. R knee edema.     Neurological exam:  Mental status: Awake and alert.  Oriented to person, place, time and situation. Recent and remote memory appear to be intact.  Fund of knowledge normal. Normal attention and concentration.   Speech/Language: Fluent and appropriate. No dysarthria or aphasia on conversation. Able to follow complex commands.   Cranial nerves (II-XII): Visual fields full. Pupils equal round and reactive to light, extraocular movements intact, no ptosis, no nystagmus. Facial sensation and symmetry intact bilaterally. Hearing grossly intact. Palate mobile and midline. Shoulder shrug normal bilaterally. Normal tongue protrusion.   Motor: 5 out of 5 strength throughout the upper and lower extremities bilaterally except for antalgic weakness in the R IP. Normal bulk and tone. No abnormal movements noted. No drift appreciated.   Sensation: Intact to light touch and vibration bilaterally.   DTR: 2+ at the biceps, deferred at the knees due to pain.   Coordination: Finger-nose-finger testing intact bilaterally. STACEY normal bilaterally. No tremor.   Gait: antalgic, guards the R knee.     DIAGNOSTIC DATA:  I have personally reviewed provider notes, labs and imaging made available to me today.     Imaging:  MRI brain wo 7/1/24:      FINDINGS:  A focal restricted diffusion in the right periventricular region posteriorly consistent with small acute lacunar infarct.  No evidence for other infarct.  Moderate subcortical and periventricular FLAIR hyperintensities likely related to chronic microvascular ischemic changes.  No evidence for sinusitis.  Midline structures are symmetric.  No evidence for sinusitis.  No evidence for hemorrhage.     Impression:     As above    CTA H&N 7/1/24:  FINDINGS:  Intracranial  Compartment:     Ventricles and sulci are normal in size for age without evidence of hydrocephalus. No extra-axial blood or fluid collections.     Mild hypoattenuation again noted within the cerebral white matter.  Remote appearing right thalamic lacunar infarct also again seen.  No parenchymal mass, hemorrhage, edema, or major vascular distribution infarct.     Skull/Extracranial Contents (limited evaluation): No fracture. Orbits and globes are within normal limits.  Paranasal sinuses and mastoid air cells are essentially clear.     Non-Vascular Structures of the Neck/Thoracic Inlet (limited evaluation): Nonvascular soft tissues of the neck are unremarkable.  No acute or aggressive bony abnormality.  Degenerative changes noted in the spine.  Visualized upper lungs are clear aside from mild bilateral dependent subsegmental atelectasis.     Aorta: There is a common origin of the brachiocephalic left common carotid arteries which is a normal variant.  Mild aortic arch atherosclerosis without origin stenosis of the great vessels.  Scattered atherosclerosis in the subclavian arteries without flow-limiting stenosis.  Small intimal flap noted in the proximal right subclavian artery.     Extracranial carotid circulation: No hemodynamically significant stenosis, aneurysmal dilatation, or dissection.  Atherosclerosis in the distal common carotid arteries, carotid bifurcations, and carotid bulbs with less than 50% stenosis bilaterally.     Extracranial vertebral circulation: Hypoplastic right vertebral artery and dominant left vertebral artery.  Atherosclerosis at the origin of the left vertebral artery with associated mild stenosis.  No other hemodynamically significant stenosis, aneurysmal dilatation, or dissection.     Intracranial Arteries: Moderate to severe stenosis along the proximal M2 segment of the right middle cerebral artery.  No other hemodynamically significant stenosis, occlusion, or aneurysm.     Venous  structures (limited evaluation): Normal.     Impression:  Moderate to severe stenosis along the proximal M2 segment of the right MCA.     Mild origin stenosis of the left vertebral artery.     Small intimal flap in the proximal right subclavian artery.     No other significant stenosis, occlusion, aneurysm, or vascular malformation seen in the head or neck.     No new evidence of acute intracranial abnormality or abnormal enhancement.     Nonspecific white matter changes likely related to chronic microvascular ischemia.  Remote appearing right thalamic lacunar infarct again noted.    Cardiac:  EKG 7/2024:  Sinus bradycardia with 1st degree A-V block     TTE 7/2/24:    Left Ventricle: The left ventricle is normal in size. Normal wall thickness. There is mild concentric hypertrophy. There is normal systolic function with a visually estimated ejection fraction of 55 - 60%. There is normal diastolic function.    Right Ventricle: Normal right ventricular cavity size. Systolic function is normal.    Pulmonary Artery: The estimated pulmonary artery systolic pressure is 15 mmHg.    IVC/SVC: Normal venous pressure at 3 mmHg.    Negative bubble study.  Normal LA size      Labs:  Lab Results   Component Value Date    WBC 4.32 07/03/2024    HGB 14.6 07/03/2024    HCT 42.6 07/03/2024     07/03/2024    MCV 98 07/03/2024    RDW 11.9 07/03/2024     Lab Results   Component Value Date     07/03/2024    K 3.6 07/03/2024     07/03/2024    CO2 22 (L) 07/03/2024    BUN 12 07/03/2024    CREATININE 1.0 07/03/2024    GLU 82 07/03/2024    CALCIUM 8.8 07/03/2024    MG 1.9 07/02/2024    PHOS 3.8 07/02/2024     Lab Results   Component Value Date    PROT 7.0 07/03/2024    ALBUMIN 3.5 07/03/2024    BILITOT 0.5 07/03/2024    AST 17 07/03/2024    ALKPHOS 67 07/03/2024    ALT 17 07/03/2024     Lab Results   Component Value Date    INR 0.9 07/02/2024     Lab Results   Component Value Date    CHOL 163 07/02/2024    HDL 38 (L)  "07/02/2024    LDLCALC 111.2 07/02/2024    TRIG 69 07/02/2024    CHOLHDL 23.3 07/02/2024     Lab Results   Component Value Date    HGBA1C 5.4 07/01/2024      No results found for: "WUSRLRQR32"  No results found for: "FOLATE"  Lab Results   Component Value Date    TSH 1.588 07/01/2024           ASSESSMENT & PLAN:  Tae Nelson Jr. is a 68 y.o. R-handed male seen in hospital follow up for CVA.     Problem List Items Addressed This Visit          Neuro    Cerebrovascular accident (CVA) due to thrombosis of right middle cerebral artery    Overview     7/2024 - no TNK         Current Assessment & Plan     Diagnostic testing from recent hospitalization reviewed with patient and family  - MRI brain shows with acute lacune in the R posterior PV region in the setting of moderately severe chronic microangiopathy  - Vascular imaging significant for moderately severe proximal R M2 stenosis, as well as mild L vertebral stenosis  - TTE with pEF. No intracardiac shunting, thrombus or LAE.   - EKG Sb with 1st deg AVB  - A1C NL,      Reviewed vascular RF: HTN     Not currently on any AP agents. In light of vascular findings, recommend DAPT for 90 days, then ASA monotherapy  Continue HI statin for goal LDL < 70.   BP goals discussed.   Dietary / lifestyle changes discussed - info on Mediterranean diet provided   CVA education / ED precautions discussed     We also discussed that he is high risk for recurrent vascular event with regards to holding AP agents for planned knee replacement in September. Recommend deferring any elective procedure for at least 9 months post CVA for best outcomes.                  Cardiac/Vascular    Primary hypertension       Orthopedic    Primary osteoarthritis of right knee       Follow up: 6 months, ok for VV     I spent a total of 70 minutes on the day of the visit.    This includes face to face time with the patient, as well as non-face to face time preparing for and completing the visit " (review of prior diagnostic testing and clinical notes, obtaining or reviewing history, documenting clinical information in the EMR, independently interpreting and communicating results to the patient/family and coordinating ongoing care).       I appreciate the opportunity to participate in the care of this patient. Please feel free to contact me with any concerns or questions.       Steffanie Skinner, [facility]  [facility]  [address] [place] [address]

## 2024-08-15 NOTE — ASSESSMENT & PLAN NOTE
Diagnostic testing from recent hospitalization reviewed with patient and family  - MRI brain shows with acute lacune in the R posterior PV region in the setting of moderately severe chronic microangiopathy  - Vascular imaging significant for moderately severe proximal R M2 stenosis, as well as mild L vertebral stenosis  - TTE with pEF. No intracardiac shunting, thrombus or LAE.   - EKG Sb with 1st deg AVB  - A1C NL,      Reviewed vascular RF: HTN     Not currently on any AP agents. In light of vascular findings, recommend DAPT for 90 days, then ASA monotherapy  Continue HI statin for goal LDL < 70.   BP goals discussed.   Dietary / lifestyle changes discussed - info on Mediterranean diet provided   CVA education / ED precautions discussed     We also discussed that he is high risk for recurrent vascular event with regards to holding AP agents for planned knee replacement in September. Recommend deferring any elective procedure for at least 9 months post CVA for best outcomes.

## 2024-08-19 ENCOUNTER — TELEPHONE (OUTPATIENT)
Dept: UROLOGY | Facility: CLINIC | Age: 69
End: 2024-08-19
Payer: MEDICARE

## 2024-08-19 NOTE — TELEPHONE ENCOUNTER
I called patient and was unable to reach him. I left a detailed message to give the office a call back.     ----- Message from Hossein Velázquez RN sent at 8/19/2024  4:35 PM CDT -----  Regarding: FW: pt appt    ----- Message -----  From: Mark Anthony Roman MD  Sent: 8/19/2024   4:15 PM CDT  To: Hossein Velázquez RN  Subject: pt appt                                          Pt with complaints of possible uti  Will need appt for marcellaal  Okay to see marvel.     I accidentally signed your note to me and couldn't get back to it.

## 2024-08-20 ENCOUNTER — CLINICAL SUPPORT (OUTPATIENT)
Dept: REHABILITATION | Facility: HOSPITAL | Age: 69
End: 2024-08-20
Attending: FAMILY MEDICINE
Payer: MEDICARE

## 2024-08-20 DIAGNOSIS — G89.29 CHRONIC NECK PAIN: ICD-10-CM

## 2024-08-20 DIAGNOSIS — M54.2 NECK PAIN: Chronic | ICD-10-CM

## 2024-08-20 DIAGNOSIS — M54.2 CHRONIC NECK PAIN: ICD-10-CM

## 2024-08-20 PROCEDURE — 97112 NEUROMUSCULAR REEDUCATION: CPT | Mod: PN

## 2024-08-20 PROCEDURE — 97161 PT EVAL LOW COMPLEX 20 MIN: CPT | Mod: PN

## 2024-08-21 PROBLEM — M54.2 NECK PAIN: Chronic | Status: ACTIVE | Noted: 2024-08-21

## 2024-08-22 NOTE — PLAN OF CARE
[facility]   Physical Therapy Initial Evaluation      Date: 8/20/2024     Name: Tae Nelson Jr.  Clinic Number: [id number]  Therapy Diagnosis:   Encounter Diagnoses   Name Primary?    Chronic neck pain     Neck pain       Physician: Orion Santiago MD      Physician Orders: PT Eval and Treat  Medical Diagnosis from Referral: Chronic neck pain  Evaluation Date: 8/20/2024  Authorization Period Expiration: 8/15/2026  Plan of Care Expiration: 10/19/2024    Progress Update: 9/19/2024   Visit # / Visits authorized: 1 / 1   FOTO: 8/20/2024 - Scored: 1 / 3       PRECAUTIONS: Standard Precautions       Time In: 0635  Time Out: 0720  Total Appointment Time (timed & untimed codes): 45 minutes    SUBJECTIVE     Date of onset: 8/14/2024  Chief Complaint: neck pain    History of current condition - Tae is a 68 y.o. male who presents to physical therapy reporting left sided neck pain which started a couple of days after CVA July 1st.  Worse yesterday when front was coming through.  States had a hard time looking left previously due to mass.    Falls: [x] No  [] Yes:     Prior Therapy:  [] No  [x] Yes: CVA  Social History: Pt lives with their spouse [x] House [] Apartment/Condo []other  Stairs: [] No  [] Yes:   Occupation: None  Prior Level of Function: Independent with all activities of daily living  Current Level of Function: independent with neck pain interfering with activity     Pain:  Current 8/10, worst 9/10, best 0 /10   Location: [] Right [x] Left [] Bilateral: Neck  Description: Burning ground meat grinding  Aggravating Factors: looking up  Easing Factors: activity avoidance, rest, medication    Pts goals: Pt reported goals are to improve pain and function    _______________________________________________________  Medical History:   Past Medical History:   Diagnosis Date    Arthritis     BPH (benign prostatic hyperplasia)     Hypertension        Surgical History:   Tae Omar  Jr.  has a past surgical history that includes Shoulder surgery (Right).    Medications:   Tae has a current medication list which includes the following prescription(s): aspirin, atorvastatin, clopidogrel, finasteride, losartan, mirabegron, and tamsulosin.    Allergies:   Review of patient's allergies indicates:  No Known Allergies       OBJECTIVE     Posture:  Pt presents with postural abnormalities which include:    [x] Forward Head   [] Increased Lumbar Lordosis   [x] Rounded Shoulder   [] Flat Back Posture   [] Increased Thoracic Kyphosis [] Inominate Rotation   [] Increased Trunk Sway  [] Genu Recurvatum   [] Increased Trunk Rotation  [] Pes Planus   [] Other:     Sensation:  Sensation is [x] Intact [] Impaired-- to light touch      CERVICAL-   Cervical  Range of Motion Right   (spine) Left    Function & Pain Goal   Cervical Flexion 80%  []Functional  []Dysfunctional  [x]Painful  []Non-Painful 100%  Functional   Nonpainful   Cervical Extension 50%  []Functional  []Dysfunctional  [x]Painful  []Non-Painful 80%  Functional   Nonpainful   Cervical Side Bending  40% 40% []Functional  []Dysfunctional  [x]Painful  []Non-Painful 70%  Functional   Nonpainful   Cervical Rotation  40% 40% []Functional  []Dysfunctional  [x]Painful  []Non-Painful 70%  Functional   Nonpainful      SHOULDER-   SHOULDER   Range of Motion Right  Active     Passive Left  Active     Passive Goal   Forward Flexion (180º) 140  140  170º   Abduction (180º) 140  140  160º   (*) pain and/or dysfunction) pain and/or dysfunction        UE STRENGTH -   Upper Extremity  Strength Right   Goal   Shoulder Flexion []1  []2  []3  []4  [x]5                []+ []- 5/5 B   Shoulder Abduction []1  []2  []3  []4  [x]5                []+ []- 5/5 B   Shoulder IR []1  []2  []3  [x]4  []5                []+ []- 5/5 B   Shoulder ER []1  []2  []3  [x]4  []5                []+ []- 5/5 B   Elbow Flexion  []1  []2  []3  []4  [x]5                []+ []- 5/5 B   Elbow  Extension []1  []2  []3  [x]4  []5                []+ []- 5/5 B     Upper Extremity  Strength Left   Goal   Shoulder Flexion []1  []2  []3  []4  [x]5                []+ []- 5/5 B   Shoulder Abduction []1  []2  []3  []4  [x]5                []+ []- 5/5 B   Shoulder IR []1  []2  []3  [x]4  []5                []+ []- 5/5 B   Shoulder ER []1  []2  []3  [x]4  []5                []+ []- 5/5 B   Elbow Flexion  []1  []2  []3  []4  [x]5                []+ []- 5/5 B   Elbow Extension []1  []2  []3  [x]4  []5                []+ []- 5/5 B      FUNCTION:     Intake Outcome Measure for FOTO Neck Survey    Therapist reviewed FOTO scores for Tae Nelson Jr. on 8/20/2024.   FOTO documents entered into EPIC - see Media section.    Intake Score: 69%         TREATMENT     Total Treatment time separate from Evaluation: (23) minutes    Tae received the following interventions:       [x] CPT Interventions Duration / Resistance   x  Upper trap stretch    x  Levator stretch    x  Resisted shrugs    x  Shoulder flexion wall slides    x  Bilateral shoulder ER                                  CPT Codes available for Billing:   (00) minutes of Manual therapy (MT) to improve pain and ROM.  (00) minutes of Therapeutic Exercise (TE) to develop strength, endurance, range of motion, and flexibility.  (23) minutes of Neuromuscular Re-Education (NMR)  to improve: Balance, Coordination, Kinesthetic, Sense, Proprioception, and Posture.  (00) minutes of Therapeutic Activities (TA) to improve functional performance.  Unattended Electrical Stimulation (ES) for muscle performance or pain modulation.  Vasopneumatic Device Therapy () for management of swelling/edema. (22124)    See EMR under MEDIA for written consent provided for Dry Needling- DATE   Palpation Assessment to determine the necessity for Functional Dry Needling    PATIENT EDUCATION AND HOME EXERCISES     Education/Self-Care provided:  (included in treatment) minutes   Patient educated  on the impairments noted above and the effects of physical therapy intervention to improve overall condition and Quality of Life  Patient was educated on all the above exercise prior/during/after for proper posture, positioning, and execution for safe performance with home exercise program.     Written Home Exercises Provided: yes. Prefers: [x] Printed [x] Electronic  Exercises were reviewed and Tae was able to demonstrate them prior to the end of the session.  Tae demonstrated good understanding of the education provided. See EMR under Patient Instructions for exercises provided during therapy sessions.      ASSESSMENT     Tae is a 68 y.o. male referred to outpatient Physical Therapy with a medical diagnosis of   Encounter Diagnoses   Name Primary?    Chronic neck pain     Neck pain    .    Physical exam supports cervical impairments including: decreased range of motion, decreased muscular strength, decreased endurance, decreased muscular length/flexibility, impaired joint mobility, and impaired functional mobility.     The above impairments will be addressed through manual therapy techniques, therapeutic exercises, functional training, and modalities as necessary. Patient was treated and educated on exercises for home program, progression of therapy, and benefits of therapy to achieve full functional mobility.       Pt prognosis is Good.   Pt will benefit from skilled outpatient Physical Therapy to address the deficits stated above and in the chart below, provide pt/family education, and to maximize pt's level of independence.     Plan of care discussed with patient: Yes  Pt's spiritual, cultural and educational needs considered and patient is agreeable to the plan of care and goals as stated below:     Anticipated Barriers for therapy: none    Medical Necessity is demonstrated by the following:     History  Co-morbidities and personal factors that may impact the plan of care [x] LOW: no personal  factors / co-morbidities  [] MODERATE: 1-2 personal factors / co-morbidities  [] HIGH: 3+ personal factors / co-morbidities    Moderate / High Support Documentation:   Co-morbidities affecting plan of care:   Past Medical History:   Diagnosis Date    Arthritis     BPH (benign prostatic hyperplasia)     Hypertension        Personal Factors:   no deficits     Examination  Body Structures and Functions, activity limitations and participation restrictions that may impact the plan of care [] LOW: addressing 1-2 elements  [] MODERATE: 3+ elements  [x] HIGH: 4+ elements (please support below)    Moderate / High Support Documentation:   [] Head / Neck  [x] Spine  [x] Upper Quarter  [] Lower Quarter  [] Range of motion Deficits  [] Gross Symmetry Deficits  [] Strength Deficits  [] Balance Deficits  [] Gait Deficits  [] Unable to participate in daily activities  [] Unable to perform functional tasks  [] Unable to Care for Self or others  [] Community/ Social Life changes due to impairments     Clinical Presentation [] LOW: stable  [x] MODERATE: Evolving  [] HIGH: Unstable     Decision Making/ Complexity Score: low         SHORT TERM GOALS:  4 weeks Progress Date Met   Recent signs and systems trend is improving in order to progress towards Long term goals.  [] Met  [] Not Met  [] Progressing    Patient will be independent with Home Exercise Program  in order to further progress and return to maximal function. [] Met  [] Not Met  [] Progressing    Pain rating at Worst: 5 /10 in order to progress towards increased independence with activity. [] Met  [] Not Met  [] Progressing    Patient will be able to correct postural deviations in sitting and standing, to decrease pain and promote postural awareness for injury prevention.  [] Met  [] Not Met  [] Progressing    Patient will improve functional outcome (FOTO) score: by 5% to increase self-worth & perceived functional ability towards long term goals [] Met  [] Not Met  []  Progressing      LONG TERM GOALS: 12 weeks  Progress Date Met   Patient will return to normal activites of daily living, recreational, and work related activities with less pain and limitation.  [] Met  [] Not Met  [] Progressing    Patient will improve range of motion  to stated goals in order to return to maximal functional potential.  [] Met  [] Not Met  [] Progressing    Patient will improve Strength to stated goals of appropriate musculature in order to improve functional independence.  [] Met  [] Not Met  [] Progressing    Pain Rating at Best: 1/10 to improve Quality of Life.  [] Met  [] Not Met  [] Progressing    Patient will meet predicted functional outcome (FOTO) score: 75% to increase self-worth & perceived functional ability. [] Met  [] Not Met  [] Progressing    Patient will have met/partially met personal goal of: improve pain and function  [] Met  [] Not Met  [] Progressing          PLAN   Plan of care Certification: 8/20/2024 to 10/19/2024    Outpatient Physical Therapy 2 times weekly for 10 weeks to include any combination of the following interventions: virtual visits, dry needling, modalities, electrical stimulation (IFC, Pre-Mod, Attended with Functional Dry Needling), Manual Therapy, Moist Heat/ Ice, Neuromuscular Re-ed, Patient Education, Self Care, Therapeutic Exercise, Functional Training, and Therapeutic Activites     Thank you for this referral.    Mich Buchanan, PT

## 2024-08-26 ENCOUNTER — CLINICAL SUPPORT (OUTPATIENT)
Dept: REHABILITATION | Facility: HOSPITAL | Age: 69
End: 2024-08-26
Attending: FAMILY MEDICINE
Payer: MEDICARE

## 2024-08-26 DIAGNOSIS — M54.2 NECK PAIN: Primary | Chronic | ICD-10-CM

## 2024-08-26 PROCEDURE — 97112 NEUROMUSCULAR REEDUCATION: CPT | Mod: PN

## 2024-08-26 PROCEDURE — 97110 THERAPEUTIC EXERCISES: CPT | Mod: PN

## 2024-08-26 PROCEDURE — 97140 MANUAL THERAPY 1/> REGIONS: CPT | Mod: PN

## 2024-08-29 NOTE — PROGRESS NOTES
[facility]   Physical Therapy Treatment Note      Name: Tae Nelson Jr.  Clinic Number: [id number]    Therapy Diagnosis:   Encounter Diagnosis   Name Primary?    Neck pain Yes     Physician: Orion Santiago MD    Visit Date: 8/26/2024    Physician Orders: PT Eval and Treat  Medical Diagnosis from Referral: Chronic neck pain  Evaluation Date: 8/20/2024  Authorization Period Expiration: 8/15/2026  Plan of Care Expiration: 10/19/2024                          Progress Update: 9/19/2024   Visit # / Visits authorized: 1 / 1          FOTO: 8/20/2024 - Scored: 1 / 3         PRECAUTIONS: Standard Precautions         Time In: 0635  Time Out: 0720  Total Appointment Time (timed & untimed codes): 45 minutes    PTA Visit #: 0/5       Subjective     Patient reports: Neck pain.  He was compliant with home exercise program.  Response to previous treatment: N/A  Functional change: N/A    Pain: 7/10  Location: left neck      Objective      Objective Measures updated at progress report unless specified.     Treatment     Tae received the treatments listed below:      CPT Intervention  Joint:   Focus Duration / Intensity      x TE UBE      x NR Rows 50 3x10    x NR Shrugs 6# 3x10    x NR Standing T RTB x10    x NR Cervical extension into ball standing X20    x TE Ball rolls on wall X20    x NR Supine T. Half round X20    x NR Supine Y. Half round X20    x NR Prone T 2x10    x NR Prone cervical retraction 2x10    x MT Manual cervical traction      x MT STM cervical paraspinals and upper traps                                                                   PLAN             CPT Codes available for Billing:   (15) minutes of Manual therapy (MT) to improve pain and ROM.  (10) minutes of Therapeutic Exercise (TE) to develop strength, endurance, range of motion, and flexibility.  (30) minutes of Neuromuscular Re-Education (NMR)  to improve: Balance, Coordination, Kinesthetic, Sense, Proprioception, and  Posture.  (00) minutes of Therapeutic Activities (TA) to improve functional performance.  Unattended Electrical Stimulation (ES) for muscle performance or pain modulation.  Vasopneumatic Device Therapy () for management of swelling/edema. (04365)  BFR: Blood flow restriction applied during exercise  NP or (-): Not Performed    Patient Education and Home Exercises       Education provided:   - Home program    Written Home Exercises Provided: Pt instructed to continue prior HEP. Exercises were reviewed and Tae was able to demonstrate them prior to the end of the session.  Tae demonstrated good  understanding of the education provided. See Electronic Medical Record under Patient Instructions for exercises provided during therapy sessions    Assessment     The patient was seen for first visit post evaluation.  He was instructed in and performed postural and upper extremity.   The patient had discomfort and difficulty with prone T.  Will continue to benefit from PT     Tae Is progressing well towards his goals.   Patient prognosis is Good.     Patient will continue to benefit from skilled outpatient physical therapy to address the deficits listed in the problem list box on initial evaluation, provide pt/family education and to maximize pt's level of independence in the home and community environment.     Patient's spiritual, cultural and educational needs considered and pt agreeable to plan of care and goals.     Anticipated barriers to physical therapy: None    SHORT TERM GOALS:  4 weeks Progress Date Met   Recent signs and systems trend is improving in order to progress towards Long term goals.  [] Met  [] Not Met  [] Progressing     Patient will be independent with Home Exercise Program  in order to further progress and return to maximal function. [] Met  [] Not Met  [] Progressing     Pain rating at Worst: 5 /10 in order to progress towards increased independence with activity. [] Met  [] Not Met  []  Progressing     Patient will be able to correct postural deviations in sitting and standing, to decrease pain and promote postural awareness for injury prevention.  [] Met  [] Not Met  [] Progressing     Patient will improve functional outcome (FOTO) score: by 5% to increase self-worth & perceived functional ability towards long term goals [] Met  [] Not Met  [] Progressing        LONG TERM GOALS: 12 weeks  Progress Date Met   Patient will return to normal activites of daily living, recreational, and work related activities with less pain and limitation.  [] Met  [] Not Met  [] Progressing     Patient will improve range of motion  to stated goals in order to return to maximal functional potential.  [] Met  [] Not Met  [] Progressing     Patient will improve Strength to stated goals of appropriate musculature in order to improve functional independence.  [] Met  [] Not Met  [] Progressing     Pain Rating at Best: 1/10 to improve Quality of Life.  [] Met  [] Not Met  [] Progressing     Patient will meet predicted functional outcome (FOTO) score: 75% to increase self-worth & perceived functional ability. [] Met  [] Not Met  [] Progressing     Patient will have met/partially met personal goal of: improve pain and function  [] Met  [] Not Met  [] Progressing              PLAN   Plan of care Certification: 8/20/2024 to 10/19/2024     Outpatient Physical Therapy 2 times weekly for 10 weeks to include any combination of the following interventions: virtual visits, dry needling, modalities, electrical stimulation (IFC, Pre-Mod, Attended with Functional Dry Needling), Manual Therapy, Moist Heat/ Ice, Neuromuscular Re-ed, Patient Education, Self Care, Therapeutic Exercise, Functional Training, and Therapeutic Activites     Mich Buchanan, PT

## 2024-10-04 NOTE — TELEPHONE ENCOUNTER
----- Message from Ava sent at 10/4/2024 12:55 PM CDT -----  Contact: [facility], [phone]  Requesting an RX refill or new RX.    Is this a refill or new RX: Refill    RX name and strength (copy/paste from chart):  atorvastatin (LIPITOR) 40 MG tablet    Is this a 30 day or 90 day RX: 90    Pharmacy name and phone # (copy/paste from chart):    [facility]/pharmacy #[id number] - [place] - [address] [facility] [address]  [address] [address]  Phone: [phone] Fax: [phone]       The doctors have asked that we provide their patients with the following 2 reminders -- prescription refills can take up to 72 hours, and a friendly reminder that in the future you can use your [facility] account to request refills: Yes

## 2024-10-04 NOTE — TELEPHONE ENCOUNTER
No care due was identified.  [facility] Embedded Care Due Messages. Reference number: [id number].   10/04/2024 1:30:31 PM CDT

## 2024-10-05 NOTE — TELEPHONE ENCOUNTER
Refill Routing Note   Medication(s) are not appropriate for processing by [facility] for the following reason(s):        No active prescription written by provider    [facility] action(s):  Defer      Medication Therapy Plan: The provider responsible for managing the medication isnt the PCP      Appointments  past 12m or future 3m with PCP    Date Provider   Last Visit   8/14/2024 Orion Santiago MD   Next Visit   Visit date not found Orion Santiago MD   ED visits in past 90 days: 0        Note composed:4:17 PM 10/05/2024

## 2024-10-10 RX ORDER — ATORVASTATIN CALCIUM 40 MG/1
40 TABLET, FILM COATED ORAL DAILY
Qty: 90 TABLET | Refills: 0 | Status: SHIPPED | OUTPATIENT
Start: 2024-10-10 | End: 2025-10-10

## 2024-11-10 DIAGNOSIS — I63.311 CEREBROVASCULAR ACCIDENT (CVA) DUE TO THROMBOSIS OF RIGHT MIDDLE CEREBRAL ARTERY: Primary | ICD-10-CM

## 2024-11-11 RX ORDER — CLOPIDOGREL BISULFATE 75 MG/1
75 TABLET ORAL
Qty: 90 TABLET | Refills: 0 | OUTPATIENT
Start: 2024-11-11

## 2024-11-21 NOTE — TELEPHONE ENCOUNTER
No care due was identified.  [facility] Embedded Care Due Messages. Reference number: [id number].   11/21/2024 9:30:19 AM CST

## 2024-11-22 RX ORDER — CLOPIDOGREL BISULFATE 75 MG/1
75 TABLET ORAL DAILY
Qty: 90 TABLET | Refills: 0 | Status: SHIPPED | OUTPATIENT
Start: 2024-11-22 | End: 2025-02-20

## 2024-12-03 RX ORDER — CLOPIDOGREL BISULFATE 75 MG/1
75 TABLET ORAL DAILY
Qty: 90 TABLET | Refills: 0 | OUTPATIENT
Start: 2024-12-03 | End: 2025-03-03

## 2024-12-03 NOTE — TELEPHONE ENCOUNTER
No care due was identified.  [facility] Embedded Care Due Messages. Reference number: [id number].   12/03/2024 11:54:06 AM CST

## 2024-12-04 NOTE — TELEPHONE ENCOUNTER
Refill Decision Note   Tae Nelson  is requesting a refill authorization.  Brief Assessment and Rationale for Refill:  Quick Discontinue     Medication Therapy Plan: duplicate Receipt confirmed by pharmacy (11/22/2024 11:43 AM CST)      Comments:     Note composed:11:07 PM 12/03/2024

## 2025-01-05 RX ORDER — ATORVASTATIN CALCIUM 40 MG/1
40 TABLET, FILM COATED ORAL DAILY
Qty: 90 TABLET | Refills: 1 | Status: SHIPPED | OUTPATIENT
Start: 2025-01-05

## 2025-01-05 NOTE — TELEPHONE ENCOUNTER
No care due was identified.  [facility] Embedded Care Due Messages. Reference number: [id number].   1/05/2025 12:27:52 AM CST

## 2025-01-06 ENCOUNTER — TELEPHONE (OUTPATIENT)
Dept: UROLOGY | Facility: CLINIC | Age: 70
End: 2025-01-06
Payer: MEDICARE

## 2025-01-06 NOTE — TELEPHONE ENCOUNTER
Tae Nelson  is requesting a refill authorization.  Brief Assessment and Rationale for Refill:  Approve     Medication Therapy Plan:         Comments:     Note composed:11:45 PM 01/05/2025

## 2025-01-06 NOTE — TELEPHONE ENCOUNTER
Returned pt call there was no answer       ----- Message from Kasie sent at 1/6/2025  2:35 PM CST -----  Contact: katie/wife  Katie is calling in regards to the patients mirabegron (MYRBETRIQ) 50 mg Tb24  RX   States [employer] (new insurance) doesn't cover it but they will need an autho to cover the medication for the patient    Please call her at

## 2025-01-14 ENCOUNTER — LAB VISIT (OUTPATIENT)
Dept: LAB | Facility: HOSPITAL | Age: 70
End: 2025-01-14
Attending: UROLOGY
Payer: MEDICARE

## 2025-01-14 ENCOUNTER — OFFICE VISIT (OUTPATIENT)
Dept: UROLOGY | Facility: CLINIC | Age: 70
End: 2025-01-14
Payer: MEDICARE

## 2025-01-14 VITALS
DIASTOLIC BLOOD PRESSURE: 64 MMHG | BODY MASS INDEX: 26.34 KG/M2 | HEIGHT: 72 IN | HEART RATE: 66 BPM | WEIGHT: 194.44 LBS | SYSTOLIC BLOOD PRESSURE: 96 MMHG | RESPIRATION RATE: 14 BRPM

## 2025-01-14 DIAGNOSIS — N40.1 BENIGN PROSTATIC HYPERPLASIA WITH NOCTURIA: ICD-10-CM

## 2025-01-14 DIAGNOSIS — R97.20 ELEVATED PSA: ICD-10-CM

## 2025-01-14 DIAGNOSIS — R35.1 BENIGN PROSTATIC HYPERPLASIA WITH NOCTURIA: ICD-10-CM

## 2025-01-14 DIAGNOSIS — R82.81 PYURIA: ICD-10-CM

## 2025-01-14 DIAGNOSIS — R97.20 ELEVATED PSA: Primary | ICD-10-CM

## 2025-01-14 LAB
BACTERIA #/AREA URNS AUTO: ABNORMAL /HPF
BILIRUB UR QL STRIP: NEGATIVE
COMPLEXED PSA SERPL-MCNC: 2.5 NG/ML (ref 0–4)
GLUCOSE UR QL STRIP: NEGATIVE
KETONES UR QL STRIP: NEGATIVE
LEUKOCYTE ESTERASE UR QL STRIP: POSITIVE
MICROSCOPIC COMMENT: ABNORMAL
PH, POC UA: 6
POC BLOOD, URINE: NEGATIVE
POC NITRATES, URINE: POSITIVE
PROT UR QL STRIP: NEGATIVE
RBC #/AREA URNS AUTO: 1 /HPF (ref 0–4)
SP GR UR STRIP: 1.03 (ref 1–1.03)
SQUAMOUS #/AREA URNS AUTO: 0 /HPF
UROBILINOGEN UR STRIP-ACNC: 0.2 (ref 0.3–2.2)
WBC #/AREA URNS AUTO: 24 /HPF (ref 0–5)
WBC CLUMPS UR QL AUTO: ABNORMAL

## 2025-01-14 PROCEDURE — 84153 ASSAY OF PSA TOTAL: CPT | Performed by: UROLOGY

## 2025-01-14 PROCEDURE — 1159F MED LIST DOCD IN RCRD: CPT | Mod: CPTII,S$GLB,, | Performed by: UROLOGY

## 2025-01-14 PROCEDURE — 81003 URINALYSIS AUTO W/O SCOPE: CPT | Mod: QW,S$GLB,, | Performed by: UROLOGY

## 2025-01-14 PROCEDURE — 1126F AMNT PAIN NOTED NONE PRSNT: CPT | Mod: CPTII,S$GLB,, | Performed by: UROLOGY

## 2025-01-14 PROCEDURE — 87086 URINE CULTURE/COLONY COUNT: CPT | Performed by: UROLOGY

## 2025-01-14 PROCEDURE — 87186 SC STD MICRODIL/AGAR DIL: CPT | Performed by: UROLOGY

## 2025-01-14 PROCEDURE — 87088 URINE BACTERIA CULTURE: CPT | Performed by: UROLOGY

## 2025-01-14 PROCEDURE — 99214 OFFICE O/P EST MOD 30 MIN: CPT | Mod: S$GLB,,, | Performed by: UROLOGY

## 2025-01-14 PROCEDURE — 36415 COLL VENOUS BLD VENIPUNCTURE: CPT | Performed by: UROLOGY

## 2025-01-14 PROCEDURE — 3074F SYST BP LT 130 MM HG: CPT | Mod: CPTII,S$GLB,, | Performed by: UROLOGY

## 2025-01-14 PROCEDURE — 99999 PR PBB SHADOW E&M-EST. PATIENT-LVL III: CPT | Mod: PBBFAC,,, | Performed by: UROLOGY

## 2025-01-14 PROCEDURE — 81001 URINALYSIS AUTO W/SCOPE: CPT | Performed by: UROLOGY

## 2025-01-14 PROCEDURE — 3008F BODY MASS INDEX DOCD: CPT | Mod: CPTII,S$GLB,, | Performed by: UROLOGY

## 2025-01-14 PROCEDURE — 3078F DIAST BP <80 MM HG: CPT | Mod: CPTII,S$GLB,, | Performed by: UROLOGY

## 2025-01-14 RX ORDER — FINASTERIDE 5 MG/1
5 TABLET, FILM COATED ORAL DAILY
Qty: 90 TABLET | Refills: 3 | Status: SHIPPED | OUTPATIENT
Start: 2025-01-14 | End: 2026-01-09

## 2025-01-14 RX ORDER — MIRABEGRON 50 MG/1
50 TABLET, FILM COATED, EXTENDED RELEASE ORAL DAILY
Qty: 30 TABLET | Refills: 5 | Status: SHIPPED | OUTPATIENT
Start: 2025-01-14 | End: 2026-01-14

## 2025-01-14 RX ORDER — TAMSULOSIN HYDROCHLORIDE 0.4 MG/1
0.4 CAPSULE ORAL DAILY
Qty: 90 CAPSULE | Refills: 3 | Status: SHIPPED | OUTPATIENT
Start: 2025-01-14

## 2025-01-14 NOTE — PROGRESS NOTES
Chief Complaint:   Encounter Diagnoses   Name Primary?    Elevated PSA Yes    Pyuria     Benign prostatic hyperplasia with nocturia        HPI:  HPI Tae Nelson Jr. edmond 69 y.o. male who presents with follow up to BPH and elevated PSA.  Patient has had negative biopsy in the past.  His PSA stable.  Prostate health index was low.  PI-RADS 2 on MRI showing 80 g gland.  He was started on finasteride last visit.  He is also started on Myrbetriq due to recent stroke overactive bladder and urinary urgency.  He has done well on this regimen.    History:  Social History     Tobacco Use    Smoking status: Never     Passive exposure: Never    Smokeless tobacco: Never    Tobacco comments:     Smokes weed   Substance Use Topics    Alcohol use: Never    Drug use: Yes     Types: Marijuana     Past Medical History:   Diagnosis Date    Arthritis     BPH (benign prostatic hyperplasia)     Hypertension      Past Surgical History:   Procedure Laterality Date    SHOULDER SURGERY Right      Family History   Problem Relation Name Age of Onset    Prostate cancer Father      Cancer Mother      No Known Problems Maternal Aunt      No Known Problems Maternal Uncle      No Known Problems Paternal Aunt      No Known Problems Paternal Uncle      No Known Problems Paternal Grandmother      No Known Problems Paternal Grandfather      No Known Problems Maternal Grandmother      No Known Problems Maternal Grandfather      No Known Problems Other         Current Outpatient Medications on File Prior to Visit   Medication Sig Dispense Refill    aspirin (ECOTRIN) 81 MG EC tablet Take 1 tablet (81 mg total) by mouth once daily. 90 tablet 3    atorvastatin (LIPITOR) 40 MG tablet Take 1 tablet (40 mg total) by mouth once daily. 90 tablet 1    clopidogreL (PLAVIX) 75 mg tablet Take 1 tablet (75 mg total) by mouth once daily. 90 tablet 0    losartan (COZAAR) 100 MG tablet Take 1 tablet (100 mg total) by mouth every evening. 90 tablet 3    [DISCONTINUED]  finasteride (PROSCAR) 5 mg tablet Take 1 tablet (5 mg total) by mouth once daily. 30 tablet 5    [DISCONTINUED] mirabegron (MYRBETRIQ) 50 mg Tb24 Take 1 tablet (50 mg total) by mouth Daily. 30 tablet 5    [DISCONTINUED] tamsulosin (FLOMAX) 0.4 mg Cap Take 1 capsule (0.4 mg total) by mouth once daily. 30 capsule 5     No current facility-administered medications on file prior to visit.        Objective:     Vitals:    01/14/25 1458   BP: 96/64   BP Location: Right arm   Patient Position: Sitting   Pulse: 66   Resp: 14   Weight: 88.2 kg (194 lb 7.1 oz)   Height: 6' (1.829 m)      BMI Readings from Last 1 Encounters:   01/14/25 26.37 kg/m²          Physical Exam  No acute distress alert and oriented   Respirations even unlabored   Abdomen is soft nontender    Lab Results   Component Value Date    CREATININE 1.0 07/03/2024      Assessment:       1. Elevated PSA    2. Pyuria    3. Benign prostatic hyperplasia with nocturia        Plan:     1. Elevated PSA    2. Pyuria    3. Benign prostatic hyperplasia with nocturia       Orders Placed This Encounter    Urine Culture High Risk    Urinalysis Microscopic    POCT Urinalysis, Dipstick, Automated, W/O Scope    tamsulosin (FLOMAX) 0.4 mg Cap    mirabegron (MYRBETRIQ) 50 mg Tb24    finasteride (PROSCAR) 5 mg tablet     Overactive bladder with BPH and elevated PSA.  Repeat PSA today.  Continue triple therapy.  Discussed need for Myrbetriq due to patient's urinary urgency associated with stroke.  Hesitant to use anticholinergic due to his significant BPH and risk of incomplete emptying and urinary retention.  We will re-evaluate him in six months.

## 2025-01-16 LAB — BACTERIA UR CULT: ABNORMAL

## 2025-01-17 ENCOUNTER — TELEPHONE (OUTPATIENT)
Dept: UROLOGY | Facility: CLINIC | Age: 70
End: 2025-01-17
Payer: MEDICARE

## 2025-01-17 NOTE — TELEPHONE ENCOUNTER
Formulary tier exception PA  for My[facility]betri[facility]  Awaiting decision.  Form in media.

## 2025-01-24 ENCOUNTER — TELEPHONE (OUTPATIENT)
Dept: UROLOGY | Facility: CLINIC | Age: 70
End: 2025-01-24
Payer: MEDICARE

## 2025-01-24 DIAGNOSIS — R35.1 BENIGN PROSTATIC HYPERPLASIA WITH NOCTURIA: ICD-10-CM

## 2025-01-24 DIAGNOSIS — N40.1 BENIGN PROSTATIC HYPERPLASIA WITH NOCTURIA: ICD-10-CM

## 2025-01-24 DIAGNOSIS — R82.81 PYURIA: Primary | ICD-10-CM

## 2025-01-24 DIAGNOSIS — R31.29 MICROHEMATURIA: ICD-10-CM

## 2025-01-24 RX ORDER — CIPROFLOXACIN 500 MG/1
500 TABLET ORAL EVERY 12 HOURS
Qty: 14 TABLET | Refills: 0 | Status: SHIPPED | OUTPATIENT
Start: 2025-01-24

## 2025-01-24 NOTE — TELEPHONE ENCOUNTER
Patient asking about lab results, I let patient know what  recommends. Patient requesting to speak to .      ----- Message from [email or web address] sent at 1/24/2025  1:19 PM CST -----  Contact: wilson - spouse  Type:  Test Results    Who Called: Tae Nelson Jr.  Name of Test (Lab/Mammo/Etc): blood work  Date of Test: last week  Ordering Provider: kalie  Where the test was performed: in office  Would the patient rather a call back or a response via [facility]? Call back  Best Call Back Number: [phone]  Additional Information:  n/a

## 2025-01-27 DIAGNOSIS — R82.81 PYURIA: Primary | ICD-10-CM

## 2025-02-05 ENCOUNTER — HOSPITAL ENCOUNTER (OUTPATIENT)
Dept: RADIOLOGY | Facility: HOSPITAL | Age: 70
Discharge: HOME | End: 2025-02-05
Attending: UROLOGY
Payer: MEDICARE

## 2025-02-05 DIAGNOSIS — N40.1 BENIGN PROSTATIC HYPERPLASIA WITH NOCTURIA: ICD-10-CM

## 2025-02-05 DIAGNOSIS — R82.81 PYURIA: ICD-10-CM

## 2025-02-05 DIAGNOSIS — R35.1 BENIGN PROSTATIC HYPERPLASIA WITH NOCTURIA: ICD-10-CM

## 2025-02-05 DIAGNOSIS — R31.29 MICROHEMATURIA: ICD-10-CM

## 2025-02-05 PROCEDURE — 74178 CT ABD&PLV WO CNTR FLWD CNTR: CPT | Mod: 26,HCNC,, | Performed by: RADIOLOGY

## 2025-02-05 PROCEDURE — 74178 CT ABD&PLV WO CNTR FLWD CNTR: CPT | Mod: TC,HCNC

## 2025-02-05 PROCEDURE — 25500020 PHARM REV CODE 255: Mod: HCNC | Performed by: UROLOGY

## 2025-02-05 RX ADMIN — IOHEXOL 100 ML: 350 INJECTION, SOLUTION INTRAVENOUS at 01:02

## 2025-02-12 ENCOUNTER — TELEPHONE (OUTPATIENT)
Dept: UROLOGY | Facility: CLINIC | Age: 70
End: 2025-02-12
Payer: MEDICARE

## 2025-02-12 NOTE — TELEPHONE ENCOUNTER
I called patient back and notified him of possible cystoscopy tomorrow with Dr. Roman. I informed patient that Dr. Roman will speak with him prior to preforming the procedure. Patient reports no further questions. Patient was reminded of upcoming appointment and location.     ----- Message from Amy sent at 2/12/2025  4:02 PM CST -----  Regarding: medical advice  Contact: Mrs. Marina/ Katie  .Type:  Needs Medical Advice    Who Called: katie   Symptoms (please be specific):    How long has patient had these symptoms:    Pharmacy name and phone #:    Would the patient rather a call back or a response via [facility]? call  Best Call Back Number: [phone]   Additional Information:  Katie is requesting a callback from the nurse today in regard to the knowing what type of procedure is the patient scheduled for tomorrow?

## 2025-02-13 ENCOUNTER — PROCEDURE VISIT (OUTPATIENT)
Dept: UROLOGY | Facility: CLINIC | Age: 70
End: 2025-02-13
Payer: MEDICARE

## 2025-02-13 DIAGNOSIS — R97.20 ELEVATED PSA: ICD-10-CM

## 2025-02-13 DIAGNOSIS — N40.1 BENIGN PROSTATIC HYPERPLASIA WITH NOCTURIA: Primary | ICD-10-CM

## 2025-02-13 DIAGNOSIS — N39.0 RECURRENT UTI: ICD-10-CM

## 2025-02-13 DIAGNOSIS — R35.1 BENIGN PROSTATIC HYPERPLASIA WITH NOCTURIA: Primary | ICD-10-CM

## 2025-02-13 LAB
BILIRUB UR QL STRIP: NEGATIVE
GLUCOSE UR QL STRIP: NEGATIVE
KETONES UR QL STRIP: NEGATIVE
LEUKOCYTE ESTERASE UR QL STRIP: NEGATIVE
PH, POC UA: 5.5
POC BLOOD, URINE: NEGATIVE
POC NITRATES, URINE: NEGATIVE
PROT UR QL STRIP: NEGATIVE
SP GR UR STRIP: 1.01 (ref 1–1.03)
UROBILINOGEN UR STRIP-ACNC: 0.2 (ref 0.3–2.2)

## 2025-02-13 PROCEDURE — 81003 URINALYSIS AUTO W/O SCOPE: CPT | Mod: QW,HCNC,S$GLB, | Performed by: UROLOGY

## 2025-02-13 PROCEDURE — 99214 OFFICE O/P EST MOD 30 MIN: CPT | Mod: HCNC,S$GLB,, | Performed by: UROLOGY

## 2025-02-13 NOTE — PROCEDURES
Chief Complaint:   Encounter Diagnoses   Name Primary?    Benign prostatic hyperplasia with nocturia Yes    Recurrent UTI     Elevated PSA        HPI:  HPI Tae Nelson Jr. edmond 69 y.o. male who presents with follow up from recurrent UTI.  He once again had a UTI last time he was seen.  He was treated with ciprofloxacin in today his urine is clear.  He is maintained on finasteride and tamsulosin.  He has been taking Myrbetriq but his insurance threatens to not cover this.  He was scheduled for cystoscopy today.    History:  Social History     Tobacco Use    Smoking status: Never     Passive exposure: Never    Smokeless tobacco: Never    Tobacco comments:     Smokes weed   Substance Use Topics    Alcohol use: Never    Drug use: Yes     Types: Marijuana     Past Medical History:   Diagnosis Date    Arthritis     BPH (benign prostatic hyperplasia)     Hypertension      Past Surgical History:   Procedure Laterality Date    SHOULDER SURGERY Right      Family History   Problem Relation Name Age of Onset    Prostate cancer Father      Cancer Mother      No Known Problems Maternal Aunt      No Known Problems Maternal Uncle      No Known Problems Paternal Aunt      No Known Problems Paternal Uncle      No Known Problems Paternal Grandmother      No Known Problems Paternal Grandfather      No Known Problems Maternal Grandmother      No Known Problems Maternal Grandfather      No Known Problems Other         Current Outpatient Medications on File Prior to Visit   Medication Sig Dispense Refill    aspirin (ECOTRIN) 81 MG EC tablet Take 1 tablet (81 mg total) by mouth once daily. 90 tablet 3    atorvastatin (LIPITOR) 40 MG tablet Take 1 tablet (40 mg total) by mouth once daily. 90 tablet 1    ciprofloxacin HCl (CIPRO) 500 MG tablet Take 1 tablet (500 mg total) by mouth every 12 (twelve) hours. 14 tablet 0    clopidogreL (PLAVIX) 75 mg tablet Take 1 tablet (75 mg total) by mouth once daily. 90 tablet 0    finasteride (PROSCAR)  5 mg tablet Take 1 tablet (5 mg total) by mouth once daily. 90 tablet 3    losartan (COZAAR) 100 MG tablet Take 1 tablet (100 mg total) by mouth every evening. 90 tablet 3    mirabegron (MYRBETRIQ) 50 mg Tb24 Take 1 tablet (50 mg total) by mouth Daily. 30 tablet 5    tamsulosin (FLOMAX) 0.4 mg Cap Take 1 capsule (0.4 mg total) by mouth once daily. 90 capsule 3     No current facility-administered medications on file prior to visit.        Objective:   There were no vitals filed for this visit.   BMI Readings from Last 1 Encounters:   01/14/25 26.37 kg/m²          Physical Exam  No acute distress alert and oriented   Respirations even unlabored   Abdomen is soft nontender     CT urogram was independently reviewed and reviewed with the patient.  Renal cyst noted, thickened bladder wall, large median lobe of prostate noted  Lab Results   Component Value Date    PSADIAG 2.5 01/14/2025    PSADIAG 4.6 (H) 07/22/2024    PSA 4.6 (H) 12/12/2023        Lab Results   Component Value Date    CREATININE 1.1 02/05/2025      Assessment:       1. Benign prostatic hyperplasia with nocturia    2. Recurrent UTI    3. Elevated PSA        Plan:     1. Benign prostatic hyperplasia with nocturia    2. Recurrent UTI    3. Elevated PSA         Discussed findings of recurrent UTI.  No upper tract abnormalities to explain this finding.  Suspect BPH related.  Recommended cystoscopy today.  Patient wishes to hold off on the scope.  We will re-evaluate him as scheduled in July.  We will need repeat PSA at that time.

## 2025-02-20 ENCOUNTER — HOSPITAL ENCOUNTER (EMERGENCY)
Facility: HOSPITAL | Age: 70
Discharge: HOME | End: 2025-02-20
Attending: EMERGENCY MEDICINE
Payer: MEDICARE

## 2025-02-20 VITALS
RESPIRATION RATE: 15 BRPM | SYSTOLIC BLOOD PRESSURE: 120 MMHG | OXYGEN SATURATION: 98 % | DIASTOLIC BLOOD PRESSURE: 69 MMHG | HEART RATE: 65 BPM | TEMPERATURE: 98 F

## 2025-02-20 DIAGNOSIS — R55 SYNCOPE, UNSPECIFIED SYNCOPE TYPE: Primary | ICD-10-CM

## 2025-02-20 DIAGNOSIS — T88.7XXA MEDICATION SIDE EFFECT: ICD-10-CM

## 2025-02-20 DIAGNOSIS — R55 SYNCOPE AND COLLAPSE: ICD-10-CM

## 2025-02-20 LAB
ALBUMIN SERPL BCP-MCNC: 3.6 G/DL (ref 3.5–5.2)
ALP SERPL-CCNC: 61 U/L (ref 40–150)
ALT SERPL W/O P-5'-P-CCNC: 32 U/L (ref 10–44)
ANION GAP SERPL CALC-SCNC: 9 MMOL/L (ref 8–16)
AST SERPL-CCNC: 21 U/L (ref 10–40)
BASOPHILS # BLD AUTO: 0.04 K/UL (ref 0–0.2)
BASOPHILS NFR BLD: 0.6 % (ref 0–1.9)
BILIRUB SERPL-MCNC: 0.7 MG/DL (ref 0.1–1)
BNP SERPL-MCNC: 12 PG/ML (ref 0–99)
BUN SERPL-MCNC: 14 MG/DL (ref 8–23)
CALCIUM SERPL-MCNC: 8.3 MG/DL (ref 8.7–10.5)
CHLORIDE SERPL-SCNC: 109 MMOL/L (ref 95–110)
CK SERPL-CCNC: 31 U/L (ref 20–200)
CO2 SERPL-SCNC: 18 MMOL/L (ref 23–29)
CREAT SERPL-MCNC: 1.1 MG/DL (ref 0.5–1.4)
DIFFERENTIAL METHOD BLD: ABNORMAL
EOSINOPHIL # BLD AUTO: 0 K/UL (ref 0–0.5)
EOSINOPHIL NFR BLD: 0.5 % (ref 0–8)
ERYTHROCYTE [DISTWIDTH] IN BLOOD BY AUTOMATED COUNT: 11.9 % (ref 11.5–14.5)
EST. GFR  (NO RACE VARIABLE): >60 ML/MIN/1.73 M^2
GLUCOSE SERPL-MCNC: 114 MG/DL (ref 70–110)
HCT VFR BLD AUTO: 40.2 % (ref 40–54)
HCV AB SERPL QL IA: NEGATIVE
HEP C VIRUS HOLD SPECIMEN: NORMAL
HGB BLD-MCNC: 13.8 G/DL (ref 14–18)
HIV 1+2 AB+HIV1 P24 AG SERPL QL IA: NEGATIVE
IMM GRANULOCYTES # BLD AUTO: 0.01 K/UL (ref 0–0.04)
IMM GRANULOCYTES NFR BLD AUTO: 0.2 % (ref 0–0.5)
LYMPHOCYTES # BLD AUTO: 0.7 K/UL (ref 1–4.8)
LYMPHOCYTES NFR BLD: 10.1 % (ref 18–48)
MAGNESIUM SERPL-MCNC: 1.7 MG/DL (ref 1.6–2.6)
MCH RBC QN AUTO: 34.1 PG (ref 27–31)
MCHC RBC AUTO-ENTMCNC: 34.3 G/DL (ref 32–36)
MCV RBC AUTO: 99 FL (ref 82–98)
MONOCYTES # BLD AUTO: 0.4 K/UL (ref 0.3–1)
MONOCYTES NFR BLD: 6.1 % (ref 4–15)
NEUTROPHILS # BLD AUTO: 5.4 K/UL (ref 1.8–7.7)
NEUTROPHILS NFR BLD: 82.5 % (ref 38–73)
NRBC BLD-RTO: 0 /100 WBC
PLATELET # BLD AUTO: 144 K/UL (ref 150–450)
PMV BLD AUTO: 10 FL (ref 9.2–12.9)
POCT GLUCOSE: 115 MG/DL (ref 70–110)
POTASSIUM SERPL-SCNC: 4 MMOL/L (ref 3.5–5.1)
PROT SERPL-MCNC: 6.9 G/DL (ref 6–8.4)
RBC # BLD AUTO: 4.05 M/UL (ref 4.6–6.2)
SODIUM SERPL-SCNC: 136 MMOL/L (ref 136–145)
TROPONIN I SERPL DL<=0.01 NG/ML-MCNC: 0.02 NG/ML (ref 0–0.03)
WBC # BLD AUTO: 6.54 K/UL (ref 3.9–12.7)

## 2025-02-20 PROCEDURE — 83735 ASSAY OF MAGNESIUM: CPT | Mod: HCNC | Performed by: EMERGENCY MEDICINE

## 2025-02-20 PROCEDURE — 84484 ASSAY OF TROPONIN QUANT: CPT | Mod: HCNC | Performed by: EMERGENCY MEDICINE

## 2025-02-20 PROCEDURE — 93010 ELECTROCARDIOGRAM REPORT: CPT | Mod: HCNC,,, | Performed by: INTERNAL MEDICINE

## 2025-02-20 PROCEDURE — 80053 COMPREHEN METABOLIC PANEL: CPT | Mod: HCNC | Performed by: EMERGENCY MEDICINE

## 2025-02-20 PROCEDURE — 87389 HIV-1 AG W/HIV-1&-2 AB AG IA: CPT | Mod: HCNC | Performed by: EMERGENCY MEDICINE

## 2025-02-20 PROCEDURE — 82550 ASSAY OF CK (CPK): CPT | Mod: HCNC | Performed by: EMERGENCY MEDICINE

## 2025-02-20 PROCEDURE — 85025 COMPLETE CBC W/AUTO DIFF WBC: CPT | Mod: HCNC | Performed by: EMERGENCY MEDICINE

## 2025-02-20 PROCEDURE — 83880 ASSAY OF NATRIURETIC PEPTIDE: CPT | Mod: HCNC | Performed by: EMERGENCY MEDICINE

## 2025-02-20 PROCEDURE — 86803 HEPATITIS C AB TEST: CPT | Mod: HCNC | Performed by: EMERGENCY MEDICINE

## 2025-02-20 PROCEDURE — 82962 GLUCOSE BLOOD TEST: CPT | Mod: HCNC

## 2025-02-20 PROCEDURE — 99284 EMERGENCY DEPT VISIT MOD MDM: CPT | Mod: 25,HCNC

## 2025-02-20 PROCEDURE — 93005 ELECTROCARDIOGRAM TRACING: CPT | Mod: HCNC

## 2025-02-21 ENCOUNTER — PATIENT OUTREACH (OUTPATIENT)
Facility: OTHER | Age: 70
End: 2025-02-21
Payer: MEDICARE

## 2025-02-21 LAB
OHS QRS DURATION: 92 MS
OHS QTC CALCULATION: 446 MS

## 2025-02-21 NOTE — ED PROVIDER NOTES
SCRIBE #1 NOTE: I, Mi Smith, am scribing for, and in the presence of, Shanika Martinez MD. I have scribed the entire note.       History     Chief Complaint   Patient presents with    Loss of Consciousness     Per [facility] pt was sitting outside, stood up to go inside and had syncopal episode. -blood thinners, - head trauma. +orthostatic. Original B/P on arrival was 90's/60's. Pt denies any complaints. . Received 1L NS prior to arrival      Review of patient's allergies indicates:  No Known Allergies      History of Present Illness     HPI    2/20/2025, 7:35 PM  History obtained from the patient, medical records, and wife      History of Present Illness: Tae Nelson Jr. is a 69 y.o. male patient with a PMHx of arthritis, BPH, and HTN who presents to the Emergency Department for evaluation of LOC which occurred earlier today. The patient's wife is at bedside and is assisting with providing history. She reports that her  had entered the house from outside when he suddenly passed out, falling to the floor from standing. Pt explains he had been seated outside for about 5 min before standing and walking indoors. He does affirm feeling light-headed prior to LOC. States he is only able to remember walking into house. Diaphoretic upon regaining consciousness. EMS called and found Pt hypotensive on scene. Pt explains his BP improved upon EMS fluid administration. No reported symptoms at this time. Patient reports only eating breakfast at 5 AM without any other food today, stating this is not untypical of his meal habits. Patient denies any headache, slurred speech, dyspnea, seizure activity, no n/v/d, fever, or chest pain. Current medications include Losartan for blood pressure, which is administered during evening. Also taking Flomax and finasteride. Pt reports taking Flomax this morning. Of note, the patient reports stroke in 7/2024. No further complaints or concerns at this time.         Arrival  mode: Ambulance Service    PCP: Orion Santiago MD        Past Medical History:  Past Medical History:   Diagnosis Date    Arthritis     BPH (benign prostatic hyperplasia)     Hypertension        Past Surgical History:  Past Surgical History:   Procedure Laterality Date    SHOULDER SURGERY Right          Family History:  Family History   Problem Relation Name Age of Onset    Prostate cancer Father      Cancer Mother      No Known Problems Maternal Aunt      No Known Problems Maternal Uncle      No Known Problems Paternal Aunt      No Known Problems Paternal Uncle      No Known Problems Paternal Grandmother      No Known Problems Paternal Grandfather      No Known Problems Maternal Grandmother      No Known Problems Maternal Grandfather      No Known Problems Other         Social History:  Social History     Tobacco Use    Smoking status: Never     Passive exposure: Never    Smokeless tobacco: Never    Tobacco comments:     Smokes weed   Substance and Sexual Activity    Alcohol use: Never    Drug use: Yes     Types: Marijuana    Sexual activity: Yes     Partners: Female        Review of Systems     Review of Systems   Constitutional:  Positive for diaphoresis (Post-syncopal episode upon regaining consciousness; resolved). Negative for fever.   HENT:  Negative for sore throat.    Respiratory:  Negative for shortness of breath.    Cardiovascular:  Negative for chest pain.   Gastrointestinal:  Negative for diarrhea, nausea and vomiting.   Genitourinary:  Negative for dysuria.   Musculoskeletal:  Negative for back pain.   Skin:  Negative for rash.   Neurological:  Positive for syncope (with LOC x1 PTA) and light-headedness (Prior to LOC; resolved.). Negative for weakness.   Hematological:  Does not bruise/bleed easily.   All other systems reviewed and are negative.     Physical Exam     Initial Vitals [02/20/25 1901]   BP Pulse Resp Temp SpO2   112/68 74 18 97.6 °F (36.4 °C) 98 %      MAP       --          Physical  Exam  Nursing Notes and Vital Signs Reviewed.  Constitutional: Patient is in no apparent distress. Well-developed and well-nourished.  Head: Atraumatic. Normocephalic.  Eyes: PERRL. EOM intact. Conjunctivae are not pale. No scleral icterus.  ENT: Mucous membranes are moist. Oropharynx is clear and symmetric.    Neck: Supple. Full ROM. No lymphadenopathy.  Cardiovascular: Regular rate. Regular rhythm. No murmurs, rubs, or gallops. Distal pulses are 2+ and symmetric.  Pulmonary/Chest: No respiratory distress. Clear to auscultation bilaterally. No wheezing or rales.  Abdominal: Soft and non-distended.  There is no tenderness.  No rebound, guarding, or rigidity. Good bowel sounds.  Musculoskeletal: Moves all extremities. No obvious deformities. No edema. No calf tenderness.  Skin: Warm and dry.  Neurological:  Alert, awake, and appropriate.  Normal speech.  No acute focal neurological deficits are appreciated.  Psychiatric: Normal affect. Good eye contact. Appropriate in content.     ED Course   Procedures  ED Vital Signs:  Vitals:    02/20/25 1901 02/20/25 1915 02/20/25 1917 02/20/25 1918   BP: 112/68 122/69 (!) 141/75 (!) 150/80   Pulse: 74 69 83 84   Resp: 18 (!) 21 (!) 24 (!) 22   Temp: 97.6 °F (36.4 °C)      TempSrc: Oral      SpO2: 98% 97%  99%       Abnormal Lab Results:  Labs Reviewed   CBC W/ AUTO DIFFERENTIAL - Abnormal       Result Value    WBC 6.54      RBC 4.05 (*)     Hemoglobin 13.8 (*)     Hematocrit 40.2      MCV 99 (*)     MCH 34.1 (*)     MCHC 34.3      RDW 11.9      Platelets 144 (*)     MPV 10.0      Immature Granulocytes 0.2      Gran # (ANC) 5.4      Immature Grans (Abs) 0.01      Lymph # 0.7 (*)     Mono # 0.4      Eos # 0.0      Baso # 0.04      nRBC 0      Gran % 82.5 (*)     Lymph % 10.1 (*)     Mono % 6.1      Eosinophil % 0.5      Basophil % 0.6      Differential Method Automated      Narrative:     Release to patient->Immediate   COMPREHENSIVE METABOLIC PANEL - Abnormal    Sodium 136       Potassium 4.0      Chloride 109      CO2 18 (*)     Glucose 114 (*)     BUN 14      Creatinine 1.1      Calcium 8.3 (*)     Total Protein 6.9      Albumin 3.6      Total Bilirubin 0.7      Alkaline Phosphatase 61      AST 21      ALT 32      eGFR >60      Anion Gap 9      Narrative:     Release to patient->Immediate   POCT GLUCOSE - Abnormal    POCT Glucose 115 (*)    HEPATITIS C ANTIBODY    Hepatitis C Ab Negative      Narrative:     Release to patient->Immediate   HEP C VIRUS HOLD SPECIMEN    HEP C Virus Hold Specimen Hold for HCV sendout      Narrative:     Release to patient->Immediate   HIV 1 / 2 ANTIBODY    HIV 1/2 Ag/Ab Negative      Narrative:     Release to patient->Immediate   TROPONIN I    Troponin I 0.016      Narrative:     Release to patient->Immediate   B-TYPE NATRIURETIC PEPTIDE    BNP 12      Narrative:     Release to patient->Immediate   MAGNESIUM    Magnesium 1.7      Narrative:     Release to patient->Immediate   CK    CPK 31      Narrative:     Release to patient->Immediate   POCT GLUCOSE MONITORING CONTINUOUS        All Lab Results:  Results for orders placed or performed during the hospital encounter of 02/20/25   POCT glucose    Collection Time: 02/20/25  7:21 PM   Result Value Ref Range    POCT Glucose 115 (H) 70 - 110 mg/dL   Hepatitis C Antibody    Collection Time: 02/20/25  7:25 PM   Result Value Ref Range    Hepatitis C Ab Negative Negative   HCV Virus Hold Specimen    Collection Time: 02/20/25  7:25 PM   Result Value Ref Range    HEP C Virus Hold Specimen Hold for HCV sendout    HIV 1/2 Ag/Ab (4th Gen)    Collection Time: 02/20/25  7:25 PM   Result Value Ref Range    HIV 1/2 Ag/Ab Negative Negative   CBC auto differential    Collection Time: 02/20/25  7:25 PM   Result Value Ref Range    WBC 6.54 3.90 - 12.70 K/uL    RBC 4.05 (L) 4.60 - 6.20 M/uL    Hemoglobin 13.8 (L) 14.0 - 18.0 g/dL    Hematocrit 40.2 40.0 - 54.0 %    MCV 99 (H) 82 - 98 fL    MCH 34.1 (H) 27.0 - 31.0 pg    MCHC 34.3  32.0 - 36.0 g/dL    RDW 11.9 11.5 - 14.5 %    Platelets 144 (L) 150 - 450 K/uL    MPV 10.0 9.2 - 12.9 fL    Immature Granulocytes 0.2 0.0 - 0.5 %    Gran # (ANC) 5.4 1.8 - 7.7 K/uL    Immature Grans (Abs) 0.01 0.00 - 0.04 K/uL    Lymph # 0.7 (L) 1.0 - 4.8 K/uL    Mono # 0.4 0.3 - 1.0 K/uL    Eos # 0.0 0.0 - 0.5 K/uL    Baso # 0.04 0.00 - 0.20 K/uL    nRBC 0 0 /100 WBC    Gran % 82.5 (H) 38.0 - 73.0 %    Lymph % 10.1 (L) 18.0 - 48.0 %    Mono % 6.1 4.0 - 15.0 %    Eosinophil % 0.5 0.0 - 8.0 %    Basophil % 0.6 0.0 - 1.9 %    Differential Method Automated    Comprehensive metabolic panel    Collection Time: 02/20/25  7:25 PM   Result Value Ref Range    Sodium 136 136 - 145 mmol/L    Potassium 4.0 3.5 - 5.1 mmol/L    Chloride 109 95 - 110 mmol/L    CO2 18 (L) 23 - 29 mmol/L    Glucose 114 (H) 70 - 110 mg/dL    BUN 14 8 - 23 mg/dL    Creatinine 1.1 0.5 - 1.4 mg/dL    Calcium 8.3 (L) 8.7 - 10.5 mg/dL    Total Protein 6.9 6.0 - 8.4 g/dL    Albumin 3.6 3.5 - 5.2 g/dL    Total Bilirubin 0.7 0.1 - 1.0 mg/dL    Alkaline Phosphatase 61 40 - 150 U/L    AST 21 10 - 40 U/L    ALT 32 10 - 44 U/L    eGFR >60 >60 mL/min/1.73 m^2    Anion Gap 9 8 - 16 mmol/L   Troponin I #1    Collection Time: 02/20/25  7:25 PM   Result Value Ref Range    Troponin I 0.016 0.000 - 0.026 ng/mL   BNP    Collection Time: 02/20/25  7:25 PM   Result Value Ref Range    BNP 12 0 - 99 pg/mL   Magnesium    Collection Time: 02/20/25  7:25 PM   Result Value Ref Range    Magnesium 1.7 1.6 - 2.6 mg/dL   CPK    Collection Time: 02/20/25  7:25 PM   Result Value Ref Range    CPK 31 20 - 200 U/L       Imaging Results:  Imaging Results    None          The EKG was ordered, reviewed, and independently interpreted by the ED provider.  Interpretation time: 19:06  Rate: 72 BPM  Rhythm: sinus arrhythmia with 1st degree AV block.  Interpretation: No STEMI.           The Emergency Provider reviewed the vital signs and test results, which are outlined above.     ED Discussion        8:29 PM: Reassessed pt at this time. Discussed with patient and/or family/caretaker all pertinent ED information and results. Discussed pt dx and plan of tx. Instructed patient to hold administration of Flomax and finasteride due to possible side effects of orthostatic hypotension. Follow up with PCP. Gave patient all f/u and return to the ED instructions. All questions and concerns were addressed at this time. Patient and/or family/caretaker expresses understanding of information and instructions, and is comfortable with plan to discharge. Pt is stable for discharge.     I discussed with patient and/or family/caretaker that evaluation in the ED does not suggest any emergent or life threatening medical conditions requiring immediate intervention beyond what was provided in the ED, and I believe patient is safe for discharge.  Regardless, an unremarkable evaluation in the ED does not preclude the development or presence of a serious of life threatening condition. As such, I instructed that the patient is to return immediately for any worsening or change in current symptoms.         Medical Decision Making  DDX: 1. Vasovagal event 2. Orthostatic hypotension 3. Dehydration 4. Arrhythmia     ECG reviewed and no acute ischemic changes, 1st degree block noted, lab work reviewed otherwise normal and stable, suspect combination of bp meds and flomax cause of symptoms, BP stable, patient non-ill appearing, also on fenofibrate advised holding for now, close pcp follow up    Amount and/or Complexity of Data Reviewed  Independent Historian: spouse     Details: The patient's wife is at bedside and is assisting with providing history.  External Data Reviewed: notes.     Details: Reviewed pt medical records via Epic. Pt was prescribed Flomax 0.4 mg on 1/14/2025.  Labs: ordered. Decision-making details documented in ED Course.  ECG/medicine tests: ordered and independent interpretation performed. Decision-making details  documented in ED Course.    Risk  Prescription drug management.                ED Medication(s):  Medications - No data to display    New Prescriptions    No medications on file        Follow-up Information       Orion Santiago MD. Schedule an appointment as soon as possible for a visit in 2 days.    Specialty: Family Medicine  Contact information:  [address] [facility] [place] [address]  [phone]               Mark Anthony Roman MD. Schedule an appointment as soon as possible for a visit in 2 days.    Specialty: Urology  Contact information:  [address] [place] [address] Dr Dandy BLAIR [id number] [phone]                                 Scribe Attestation:   Scribe #1: I performed the above scribed service and the documentation accurately describes the services I performed. I attest to the accuracy of the note.     Attending:   Physician Attestation Statement for Scribe #1: I, Shanika Martinez MD, personally performed the services described in this documentation, as scribed by Mi Smith, in my presence, and it is both accurate and complete.           Clinical Impression       ICD-10-CM ICD-9-CM   1. Syncope, unspecified syncope type  R55 780.2   2. Syncope and collapse  R55 780.2   3. Medication side effect  T88.7XXA 995.20       Disposition:   Disposition: Discharged  Condition: Stable       Shanika Martinez MD  02/26/25 0743

## 2025-02-21 NOTE — PROGRESS NOTES
Katie Montoya  ED Navigator  Emergency Department    [place] [facility] [place]  Role: Community Health Worker    Date: 02/21/2025  Patient Name: Tae Nelson Jr.  MRN: [id number]  PCP: Orion Santiago MD    Assessment:     Tae Nelson Jr. is a 69 y.o. male who has presented to ED for Loss of Consciousness. Patient has visited the ED 1 times in the past 3 months. Patient did not contact PCP.     ED Navigator Initial Assessment    ED Navigator Enrollment Documentation  Consent to Services  Does patient consent to completing the assessment?: Yes  Contact  Method of Initial Contact: Phone  Transportation  Insurance Coverage  Do you have coverage/adequate coverage?: Yes  Specialist Appointment  Did the patient come to the ED to see a specialist?: No  Does the patient have a pending specialist referral?: No  Does the patient have a specialist appointment made?: No  PCP Follow Up Appointment  Medications  Is patient able to afford medication?: Yes  Psychological  Food  Communication/Education  Other Financial Concerns  Other Social Barriers/Concerns  Primary Barrier  Plan: Provided information for [facility] 24/7 Nurse triage line, [phone] or [phone] ([phone])         Social History     Socioeconomic History    Marital status:    Tobacco Use    Smoking status: Never     Passive exposure: Never    Smokeless tobacco: Never    Tobacco comments:     Smokes weed   Substance and Sexual Activity    Alcohol use: Never    Drug use: Yes     Types: Marijuana    Sexual activity: Yes     Partners: Female     Social Drivers of Health     Financial Resource Strain: Low Risk  (2/21/2025)    Overall Financial Resource Strain (CARDIA)     Difficulty of Paying Living Expenses: Not hard at all   Food Insecurity: No Food Insecurity (2/21/2025)    Hunger Vital Sign     Worried About Running Out of Food in the Last Year: Never true     Ran Out of Food in the Last Year: Never true   Transportation Needs: No  Transportation Needs (2/21/2025)    PRAPARE - Transportation     Lack of Transportation (Medical): No     Lack of Transportation (Non-Medical): No   Stress: No Stress Concern Present (2/21/2025)    Citizen of [place] of Occupational Health - Occupational Stress Questionnaire     Feeling of Stress : Not at all   Housing Stability: Low Risk  (2/21/2025)    Housing Stability Vital Sign     Unable to Pay for Housing in the Last Year: No     Homeless in the Last Year: No       Plan:   Spoke with patient on the telephone for [facility] ED Navigation.  Patient was seen in the ED on 02/20/25 for Loss of Consciousness.  Patient reported he is feeling tired, but is otherwise doing ok.  Patient declined my offer to schedule a follow up appointment with his PCP.  He stated he thinks this health episode was related to medication he was prescribed by his Urologist.  Patient has a previously scheduled appointment with his urologist in July but would like to be seen sooner.  I attempted to schedule a visit for him in [facility] but was unable to do so.  A message was sent to the staff of Dr. Roman requesting scheduling assistance.  No additional needs or concerns were reported.  Information on  [facility] Nurse Triage line was provided to patient, and he was encouraged to contact me for help with scheduling/community resources, as needed.    Katie Montoya  ED Navigator

## 2025-02-21 NOTE — DISCHARGE INSTRUCTIONS
Both Finasteride and Tamulosin can cause a drop in blood pressure and then can cause you to pass out.  Please hold these medications until you talk to Dr. Roman.

## 2025-02-24 DIAGNOSIS — Z00.00 ENCOUNTER FOR MEDICARE ANNUAL WELLNESS EXAM: ICD-10-CM

## 2025-02-27 ENCOUNTER — PATIENT OUTREACH (OUTPATIENT)
Facility: OTHER | Age: 70
End: 2025-02-27
Payer: MEDICARE

## 2025-03-02 NOTE — TELEPHONE ENCOUNTER
No care due was identified.  Health [facility] Embedded Care Due Messages. Reference number: [id number].   3/02/2025 12:18:05 AM CST

## 2025-03-05 ENCOUNTER — TELEPHONE (OUTPATIENT)
Dept: UROLOGY | Facility: CLINIC | Age: 70
End: 2025-03-05
Payer: MEDICARE

## 2025-03-05 NOTE — TELEPHONE ENCOUNTER
Called and let patient know that his appointment will be at the [facility] location tomorrow. Patient expressed understanding confirmed time with patient and let him know to go to the second floor as well.      ----- Message from Meg sent at 3/5/2025  9:59 AM CST -----  Contact: [phone]  [email or web address] Patient is calling for Medical Advice regarding:appt for tomorrow How long has patient had these symptoms:I do not see the appt Pharmacy name and phone#:Patient wants a call back or thru [facility]:call back Comments:They are needing to know what location they have to go to for the appt tomorrow please advise Please advise patient replies from provider may take up to 48 hours.

## 2025-03-06 ENCOUNTER — OFFICE VISIT (OUTPATIENT)
Dept: UROLOGY | Facility: CLINIC | Age: 70
End: 2025-03-06
Payer: MEDICARE

## 2025-03-06 VITALS
BODY MASS INDEX: 26.28 KG/M2 | WEIGHT: 194 LBS | DIASTOLIC BLOOD PRESSURE: 83 MMHG | HEART RATE: 75 BPM | SYSTOLIC BLOOD PRESSURE: 120 MMHG | HEIGHT: 72 IN

## 2025-03-06 DIAGNOSIS — R35.1 BENIGN PROSTATIC HYPERPLASIA WITH NOCTURIA: Primary | ICD-10-CM

## 2025-03-06 DIAGNOSIS — N40.1 BENIGN PROSTATIC HYPERPLASIA WITH NOCTURIA: Primary | ICD-10-CM

## 2025-03-06 PROBLEM — M54.2 NECK PAIN: Chronic | Status: RESOLVED | Noted: 2024-08-21 | Resolved: 2025-03-06

## 2025-03-06 LAB
BILIRUB UR QL STRIP: NEGATIVE
GLUCOSE UR QL STRIP: NEGATIVE
KETONES UR QL STRIP: NEGATIVE
LEUKOCYTE ESTERASE UR QL STRIP: NEGATIVE
PH, POC UA: 6
POC BLOOD, URINE: NEGATIVE
POC NITRATES, URINE: NEGATIVE
POC RESIDUAL URINE VOLUME: 77 ML (ref 0–100)
PROT UR QL STRIP: NEGATIVE
SP GR UR STRIP: 1.02 (ref 1–1.03)
UROBILINOGEN UR STRIP-ACNC: 1 (ref 0.3–2.2)

## 2025-03-06 PROCEDURE — 99214 OFFICE O/P EST MOD 30 MIN: CPT | Mod: HCNC,S$GLB,, | Performed by: NURSE PRACTITIONER

## 2025-03-06 PROCEDURE — 3074F SYST BP LT 130 MM HG: CPT | Mod: HCNC,CPTII,S$GLB, | Performed by: NURSE PRACTITIONER

## 2025-03-06 PROCEDURE — 3288F FALL RISK ASSESSMENT DOCD: CPT | Mod: HCNC,CPTII,S$GLB, | Performed by: NURSE PRACTITIONER

## 2025-03-06 PROCEDURE — 1101F PT FALLS ASSESS-DOCD LE1/YR: CPT | Mod: HCNC,CPTII,S$GLB, | Performed by: NURSE PRACTITIONER

## 2025-03-06 PROCEDURE — 3008F BODY MASS INDEX DOCD: CPT | Mod: HCNC,CPTII,S$GLB, | Performed by: NURSE PRACTITIONER

## 2025-03-06 PROCEDURE — 81003 URINALYSIS AUTO W/O SCOPE: CPT | Mod: QW,HCNC,S$GLB, | Performed by: NURSE PRACTITIONER

## 2025-03-06 PROCEDURE — 51798 US URINE CAPACITY MEASURE: CPT | Mod: HCNC,S$GLB,, | Performed by: NURSE PRACTITIONER

## 2025-03-06 PROCEDURE — 1159F MED LIST DOCD IN RCRD: CPT | Mod: HCNC,CPTII,S$GLB, | Performed by: NURSE PRACTITIONER

## 2025-03-06 PROCEDURE — 3079F DIAST BP 80-89 MM HG: CPT | Mod: HCNC,CPTII,S$GLB, | Performed by: NURSE PRACTITIONER

## 2025-03-06 PROCEDURE — 99999 PR PBB SHADOW E&M-EST. PATIENT-LVL III: CPT | Mod: PBBFAC,HCNC,, | Performed by: NURSE PRACTITIONER

## 2025-03-06 PROCEDURE — 1126F AMNT PAIN NOTED NONE PRSNT: CPT | Mod: HCNC,CPTII,S$GLB, | Performed by: NURSE PRACTITIONER

## 2025-03-06 NOTE — PROGRESS NOTES
Chief Complaint:   BPH    HPI:   Patient was recently seen in the emergency department secondary to syncope.  Was told by ED physician that tamsulosin had cause his syncope, however, patient has been on tamsulosin for over 8 months.  Patient is currently on tamsulosin, Myrbetriq and finasteride.  Abel GARG  02/13/2025  Tae Nelson Jr. edmond 69 y.o. male who presents with follow up from recurrent UTI.  He once again had a UTI last time he was seen.  He was treated with ciprofloxacin in today his urine is clear.  He is maintained on finasteride and tamsulosin.  He has been taking Myrbetriq but his insurance threatens to not cover this.  He was scheduled for cystoscopy today.     Allergies:  Patient has no known allergies.    Medications:  has a current medication list which includes the following prescription(s): aspirin, atorvastatin, ciprofloxacin hcl, clopidogrel, finasteride, losartan, mirabegron, and tamsulosin.    Review of Systems:  General: No fever, chills, fatigability, or weight loss.  Skin: No rashes, itching, or changes in color or texture of skin.  Chest: Denies BLAKE, cyanosis, wheezing, cough, and sputum production.  Abdomen: Appetite fine. No weight loss. Denies diarrhea, abdominal pain, hematemesis, or blood in stool.  Musculoskeletal: No joint stiffness or swelling. Denies back pain.  : As above.  All other review of systems negative.    PMH:   has a past medical history of Arthritis, BPH (benign prostatic hyperplasia), and Hypertension.    PSH:   has a past surgical history that includes Shoulder surgery (Right).    FamHx: family history includes Cancer in his mother; No Known Problems in his maternal aunt, maternal grandfather, maternal grandmother, maternal uncle, paternal aunt, paternal grandfather, paternal grandmother, paternal uncle, and another family member; Prostate cancer in his father.    SocHx:  reports that he has never smoked. He has never been exposed to tobacco smoke. He has never  used smokeless tobacco. He reports current drug use. Drug: Marijuana. He reports that he does not drink alcohol.      Physical Exam:  General: A&Ox3, no apparent distress, no deformities  Neck: No masses, normal thyroid  Lungs: normal inspiration, no use of accessory muscles  Heart: normal pulse, no arrhythmias  Abdomen: Soft, NT, ND, no masses, no hernias, no hepatosplenomegaly  Lymphatic: Neck and groin nodes negative  Skin: The skin is warm and dry. No jaundice..    Impression/Plan:   I explained to patient and his wife that I do not believe that tamsulosin caused to syncope, however, will discontinue for 2 months, remain on Myrbetriq and finasteride.  Return to clinic for re-evaluation in 2 months.

## 2025-03-07 RX ORDER — CLOPIDOGREL BISULFATE 75 MG/1
75 TABLET ORAL
Qty: 90 TABLET | Refills: 0 | Status: SHIPPED | OUTPATIENT
Start: 2025-03-07

## 2025-05-15 ENCOUNTER — OFFICE VISIT (OUTPATIENT)
Dept: UROLOGY | Facility: CLINIC | Age: 70
End: 2025-05-15
Payer: MEDICARE

## 2025-05-15 VITALS — SYSTOLIC BLOOD PRESSURE: 131 MMHG | HEART RATE: 92 BPM | DIASTOLIC BLOOD PRESSURE: 81 MMHG

## 2025-05-15 DIAGNOSIS — R31.29 MICROHEMATURIA: Primary | ICD-10-CM

## 2025-05-15 LAB
BILIRUBIN, UA POC OHS: ABNORMAL
BLOOD, UA POC OHS: NEGATIVE
CLARITY, UA POC OHS: CLEAR
COLOR, UA POC OHS: YELLOW
GLUCOSE, UA POC OHS: NEGATIVE
KETONES, UA POC OHS: ABNORMAL
LEUKOCYTES, UA POC OHS: NEGATIVE
NITRITE, UA POC OHS: NEGATIVE
PH, UA POC OHS: 5.5
PROTEIN, UA POC OHS: ABNORMAL
SPECIFIC GRAVITY, UA POC OHS: 1.02
UROBILINOGEN, UA POC OHS: 0.2

## 2025-05-15 PROCEDURE — 81003 URINALYSIS AUTO W/O SCOPE: CPT | Mod: QW,HCNC,S$GLB, | Performed by: NURSE PRACTITIONER

## 2025-05-15 PROCEDURE — 3079F DIAST BP 80-89 MM HG: CPT | Mod: CPTII,HCNC,S$GLB, | Performed by: NURSE PRACTITIONER

## 2025-05-15 PROCEDURE — 3075F SYST BP GE 130 - 139MM HG: CPT | Mod: CPTII,HCNC,S$GLB, | Performed by: NURSE PRACTITIONER

## 2025-05-15 PROCEDURE — 4010F ACE/ARB THERAPY RXD/TAKEN: CPT | Mod: CPTII,HCNC,S$GLB, | Performed by: NURSE PRACTITIONER

## 2025-05-15 PROCEDURE — 99214 OFFICE O/P EST MOD 30 MIN: CPT | Mod: HCNC,S$GLB,, | Performed by: NURSE PRACTITIONER

## 2025-05-15 PROCEDURE — 1101F PT FALLS ASSESS-DOCD LE1/YR: CPT | Mod: CPTII,HCNC,S$GLB, | Performed by: NURSE PRACTITIONER

## 2025-05-15 PROCEDURE — 99999 PR PBB SHADOW E&M-EST. PATIENT-LVL III: CPT | Mod: PBBFAC,HCNC,, | Performed by: NURSE PRACTITIONER

## 2025-05-15 PROCEDURE — 1126F AMNT PAIN NOTED NONE PRSNT: CPT | Mod: CPTII,HCNC,S$GLB, | Performed by: NURSE PRACTITIONER

## 2025-05-15 PROCEDURE — 1159F MED LIST DOCD IN RCRD: CPT | Mod: CPTII,HCNC,S$GLB, | Performed by: NURSE PRACTITIONER

## 2025-05-15 PROCEDURE — 3288F FALL RISK ASSESSMENT DOCD: CPT | Mod: CPTII,HCNC,S$GLB, | Performed by: NURSE PRACTITIONER

## 2025-05-15 PROCEDURE — 1160F RVW MEDS BY RX/DR IN RCRD: CPT | Mod: CPTII,HCNC,S$GLB, | Performed by: NURSE PRACTITIONER

## 2025-05-15 NOTE — PROGRESS NOTES
Chief Complaint:   BPH    HPI:   Patient is presenting as a follow-up to discontinuation of tamsulosin.  Was seen in the ED secondary to syncope and stopped tamsulosin, concerned that it was causing the syncope.  Patient is currently on finasteride and Myrbetriq with a complete resolution of all symptoms, does not want to consider restarting tamsulosin.  Urine in clinic is negative, had a positive urine culture indicating E coli if beginning of the year.  PVR is 14 mL.  03/06/2025  Patient was recently seen in the emergency department secondary to syncope.  Was told by ED physician that tamsulosin had cause his syncope, however, patient has been on tamsulosin for over 8 months.  Patient is currently on tamsulosin, Myrbetriq and finasteride.  Abel GARG  02/13/2025  Tae Nelson Jr. Is a 69 y.o. male who presents with follow up from recurrent UTI.  He once again had a UTI last time he was seen.  He was treated with ciprofloxacin in today his urine is clear.  He is maintained on finasteride and tamsulosin.  He has been taking Myrbetriq but his insurance threatens to not cover this.  He was scheduled for cystoscopy today  Allergies:  Patient has no known allergies.    Medications:  has a current medication list which includes the following prescription(s): aspirin, atorvastatin, clopidogrel, finasteride, losartan, and mirabegron.    Review of Systems:  General: No fever, chills, fatigability, or weight loss.  Skin: No rashes, itching, or changes in color or texture of skin.  Chest: Denies BLAKE, cyanosis, wheezing, cough, and sputum production.  Abdomen: Appetite fine. No weight loss. Denies diarrhea, abdominal pain, hematemesis, or blood in stool.  Musculoskeletal: No joint stiffness or swelling. Denies back pain.  : As above.  All other review of systems negative.    PMH:   has a past medical history of Arthritis, BPH (benign prostatic hyperplasia), and Hypertension.    PSH:   has a past surgical history that  includes Shoulder surgery (Right).    FamHx: family history includes Cancer in his mother; No Known Problems in his maternal aunt, maternal grandfather, maternal grandmother, maternal uncle, paternal aunt, paternal grandfather, paternal grandmother, paternal uncle, and another family member; Prostate cancer in his father.    SocHx:  reports that he has never smoked. He has never been exposed to tobacco smoke. He has never used smokeless tobacco. He reports current drug use. Drug: Marijuana. He reports that he does not drink alcohol.      Physical Exam:  General: A&Ox3, no apparent distress, no deformities  Neck: No masses, normal thyroid  Lungs: normal inspiration, no use of accessory muscles  Heart: normal pulse, no arrhythmias  Abdomen: Soft, NT, ND, no masses, no hernias, no hepatosplenomegaly  Lymphatic: Neck and groin nodes negative  Skin: The skin is warm and dry. No jaundice.  Ext: No c/c/e.    Labs/Studies:    Latest Reference Range & Units 07/22/24 09:27 01/14/25 15:24   PSA Diagnostic 0.00 - 4.00 ng/mL 4.6 (H) 2.5   (H): Data is abnormally high  Impression/Plan:   Patient to remain on finasteride and Myrbetriq, has a complete resolution to all urinary symptoms without adverse side effects.  Will stay off of tamsulosin secondary to possible syncope.  Reiterated behavior modifications needed to decrease risk of E coli cystitis.  Return to clinic in 12 months for BOB and PSA.

## 2025-06-06 DIAGNOSIS — I63.311 CEREBROVASCULAR ACCIDENT (CVA) DUE TO THROMBOSIS OF RIGHT MIDDLE CEREBRAL ARTERY: Primary | ICD-10-CM

## 2025-06-07 NOTE — TELEPHONE ENCOUNTER
Care Due:                  Date            Visit Type   Department     Provider  --------------------------------------------------------------------------------                                EP -                              PRIMARY      [facility] INTERNAL  Last Visit: 08-      CARE ([facility])   MEDICINE       Orion Santiago  Next Visit: None Scheduled  None         None Found                                                            Last  Test          Frequency    Reason                     Performed    Due Date  --------------------------------------------------------------------------------    Lipid Panel.  12 months..  atorvastatin.............  07- 06-    [facility] Embedded Care Due Messages. Reference number: [id number].   6/06/2025 7:16:40 PM CDT

## 2025-06-09 RX ORDER — CLOPIDOGREL BISULFATE 75 MG/1
75 TABLET ORAL
Qty: 90 TABLET | Refills: 0 | Status: SHIPPED | OUTPATIENT
Start: 2025-06-09

## 2025-06-18 RX ORDER — ATORVASTATIN CALCIUM 40 MG/1
40 TABLET, FILM COATED ORAL
Qty: 90 TABLET | Refills: 0 | Status: SHIPPED | OUTPATIENT
Start: 2025-06-18

## 2025-06-18 NOTE — TELEPHONE ENCOUNTER
Refill Decision Note   Tae Nelson  is requesting a refill authorization.  Brief Assessment and Rationale for Refill:  Approve     Medication Therapy Plan:         Alert overridden per protocol: Yes   Comments:     Note composed:11:20 AM 06/18/2025

## 2025-06-20 DIAGNOSIS — I63.311 CEREBROVASCULAR ACCIDENT (CVA) DUE TO THROMBOSIS OF RIGHT MIDDLE CEREBRAL ARTERY: Primary | ICD-10-CM

## 2025-06-20 RX ORDER — ASPIRIN 81 MG/1
81 TABLET ORAL
Qty: 90 TABLET | Refills: 0 | Status: SHIPPED | OUTPATIENT
Start: 2025-06-20

## 2025-06-21 ENCOUNTER — PATIENT MESSAGE (OUTPATIENT)
Dept: ADMINISTRATIVE | Facility: CLINIC | Age: 70
End: 2025-06-21
Payer: MEDICARE

## 2025-07-14 ENCOUNTER — OFFICE VISIT (OUTPATIENT)
Dept: UROLOGY | Facility: CLINIC | Age: 70
End: 2025-07-14
Payer: MEDICARE

## 2025-07-14 VITALS
WEIGHT: 194 LBS | HEART RATE: 71 BPM | DIASTOLIC BLOOD PRESSURE: 83 MMHG | HEIGHT: 72 IN | BODY MASS INDEX: 26.28 KG/M2 | SYSTOLIC BLOOD PRESSURE: 132 MMHG

## 2025-07-14 DIAGNOSIS — R97.20 ELEVATED PSA: ICD-10-CM

## 2025-07-14 DIAGNOSIS — N40.1 BENIGN PROSTATIC HYPERPLASIA WITH NOCTURIA: Primary | ICD-10-CM

## 2025-07-14 DIAGNOSIS — R35.1 BENIGN PROSTATIC HYPERPLASIA WITH NOCTURIA: Primary | ICD-10-CM

## 2025-07-14 DIAGNOSIS — N39.0 RECURRENT UTI: ICD-10-CM

## 2025-07-14 PROCEDURE — 1101F PT FALLS ASSESS-DOCD LE1/YR: CPT | Mod: CPTII,S$GLB,, | Performed by: UROLOGY

## 2025-07-14 PROCEDURE — 1126F AMNT PAIN NOTED NONE PRSNT: CPT | Mod: CPTII,S$GLB,, | Performed by: UROLOGY

## 2025-07-14 PROCEDURE — 99999 PR PBB SHADOW E&M-EST. PATIENT-LVL III: CPT | Mod: PBBFAC,,, | Performed by: UROLOGY

## 2025-07-14 PROCEDURE — 4010F ACE/ARB THERAPY RXD/TAKEN: CPT | Mod: CPTII,S$GLB,, | Performed by: UROLOGY

## 2025-07-14 PROCEDURE — 3079F DIAST BP 80-89 MM HG: CPT | Mod: CPTII,S$GLB,, | Performed by: UROLOGY

## 2025-07-14 PROCEDURE — 3008F BODY MASS INDEX DOCD: CPT | Mod: CPTII,S$GLB,, | Performed by: UROLOGY

## 2025-07-14 PROCEDURE — 3288F FALL RISK ASSESSMENT DOCD: CPT | Mod: CPTII,S$GLB,, | Performed by: UROLOGY

## 2025-07-14 PROCEDURE — 1159F MED LIST DOCD IN RCRD: CPT | Mod: CPTII,S$GLB,, | Performed by: UROLOGY

## 2025-07-14 PROCEDURE — 99214 OFFICE O/P EST MOD 30 MIN: CPT | Mod: S$GLB,,, | Performed by: UROLOGY

## 2025-07-14 PROCEDURE — 3075F SYST BP GE 130 - 139MM HG: CPT | Mod: CPTII,S$GLB,, | Performed by: UROLOGY

## 2025-07-14 NOTE — PROGRESS NOTES
Chief Complaint:   Encounter Diagnoses   Name Primary?    Benign prostatic hyperplasia with nocturia Yes    Elevated PSA     Recurrent UTI        HPI:  HPI Tae Nelson Jr. edmond 69 y.o. male who presents with follow up of BPH and recurrent UTIs.  This appointment was scheduled prior to his recent appointments with Antonina.  He has now been switched over to finasteride and Myrbetriq.  He has done well with this regimen.  He is having minimal lower urinary tract symptoms.  He has a occasional urgency.  He states his stream is pretty good.  He has had no further UTIs or hematuria.    History:  Social History[1]  Past Medical History:   Diagnosis Date    Arthritis     BPH (benign prostatic hyperplasia)     Hypertension      Past Surgical History:   Procedure Laterality Date    SHOULDER SURGERY Right      Family History   Problem Relation Name Age of Onset    Prostate cancer Father      Cancer Mother      No Known Problems Maternal Aunt      No Known Problems Maternal Uncle      No Known Problems Paternal Aunt      No Known Problems Paternal Uncle      No Known Problems Paternal Grandmother      No Known Problems Paternal Grandfather      No Known Problems Maternal Grandmother      No Known Problems Maternal Grandfather      No Known Problems Other         Medications Ordered Prior to Encounter[2]     Objective:     Vitals:    07/14/25 1439   BP: 132/83   Pulse: 71   Weight: 88 kg (194 lb 0.1 oz)   Height: 6' (1.829 m)      BMI Readings from Last 1 Encounters:   07/14/25 26.31 kg/m²          Physical Exam  No acute distress alert and oriented   Respirations even unlabored   Abdomen is soft nontender  Lab Results   Component Value Date    PSADIAG 2.5 01/14/2025    PSADIAG 4.6 (H) 07/22/2024    PSA 4.6 (H) 12/12/2023        Lab Results   Component Value Date    CREATININE 1.1 02/20/2025      Assessment:       1. Benign prostatic hyperplasia with nocturia    2. Elevated PSA    3. Recurrent UTI        Plan:     1. Benign  prostatic hyperplasia with nocturia    2. Elevated PSA    3. Recurrent UTI           BPH with a history of recurrent UTIs.  Patient chose not have cystoscopy.  He is now doing better in his PSA has normalized on finasteride.  He is scheduled to see Antonina in May of next year.  Continue current treatment with finasteride and Myrbetriq.  Patient will let us know if he develops any new hematuria or UTIs.       [1]   Social History  Tobacco Use    Smoking status: Never     Passive exposure: Never    Smokeless tobacco: Never    Tobacco comments:     Smokes weed   Substance Use Topics    Alcohol use: Never    Drug use: Yes     Types: Marijuana   [2]   Current Outpatient Medications on File Prior to Visit   Medication Sig Dispense Refill    aspirin (ECOTRIN) 81 MG EC tablet TAKE 1 TABLET BY MOUTH EVERY DAY 90 tablet 0    atorvastatin (LIPITOR) 40 MG tablet TAKE 1 TABLET BY MOUTH EVERY DAY 90 tablet 0    clopidogreL (PLAVIX) 75 mg tablet TAKE 1 TABLET BY MOUTH EVERY DAY 90 tablet 0    finasteride (PROSCAR) 5 mg tablet Take 1 tablet (5 mg total) by mouth once daily. 90 tablet 3    losartan (COZAAR) 100 MG tablet Take 1 tablet (100 mg total) by mouth every evening. 90 tablet 3    mirabegron (MYRBETRIQ) 50 mg Tb24 Take 1 tablet (50 mg total) by mouth Daily. 30 tablet 5     No current facility-administered medications on file prior to visit.

## 2025-08-01 ENCOUNTER — OFFICE VISIT (OUTPATIENT)
Dept: FAMILY MEDICINE | Facility: CLINIC | Age: 70
End: 2025-08-01
Payer: MEDICARE

## 2025-08-01 VITALS
HEIGHT: 72 IN | HEART RATE: 105 BPM | BODY MASS INDEX: 26.3 KG/M2 | OXYGEN SATURATION: 95 % | SYSTOLIC BLOOD PRESSURE: 120 MMHG | DIASTOLIC BLOOD PRESSURE: 84 MMHG | TEMPERATURE: 98 F | WEIGHT: 194.13 LBS

## 2025-08-01 DIAGNOSIS — J06.9 UPPER RESPIRATORY TRACT INFECTION, UNSPECIFIED TYPE: Primary | ICD-10-CM

## 2025-08-01 LAB
CTP QC/QA: YES
POC MOLECULAR INFLUENZA A AGN: NEGATIVE
POC MOLECULAR INFLUENZA B AGN: NEGATIVE
S PYO RRNA THROAT QL PROBE: NEGATIVE
SARS-COV-2 RDRP RESP QL NAA+PROBE: NEGATIVE

## 2025-08-01 PROCEDURE — 3074F SYST BP LT 130 MM HG: CPT | Mod: CPTII,S$GLB,, | Performed by: NURSE PRACTITIONER

## 2025-08-01 PROCEDURE — 96372 THER/PROPH/DIAG INJ SC/IM: CPT | Mod: S$GLB,,, | Performed by: NURSE PRACTITIONER

## 2025-08-01 PROCEDURE — 3079F DIAST BP 80-89 MM HG: CPT | Mod: CPTII,S$GLB,, | Performed by: NURSE PRACTITIONER

## 2025-08-01 PROCEDURE — 99999 PR PBB SHADOW E&M-EST. PATIENT-LVL III: CPT | Mod: PBBFAC,,, | Performed by: NURSE PRACTITIONER

## 2025-08-01 PROCEDURE — 87635 SARS-COV-2 COVID-19 AMP PRB: CPT | Mod: QW,S$GLB,, | Performed by: NURSE PRACTITIONER

## 2025-08-01 PROCEDURE — 4010F ACE/ARB THERAPY RXD/TAKEN: CPT | Mod: CPTII,S$GLB,, | Performed by: NURSE PRACTITIONER

## 2025-08-01 PROCEDURE — 99214 OFFICE O/P EST MOD 30 MIN: CPT | Mod: 25,S$GLB,, | Performed by: NURSE PRACTITIONER

## 2025-08-01 PROCEDURE — 87502 INFLUENZA DNA AMP PROBE: CPT | Mod: QW,S$GLB,, | Performed by: NURSE PRACTITIONER

## 2025-08-01 PROCEDURE — 1159F MED LIST DOCD IN RCRD: CPT | Mod: CPTII,S$GLB,, | Performed by: NURSE PRACTITIONER

## 2025-08-01 PROCEDURE — 87880 STREP A ASSAY W/OPTIC: CPT | Mod: QW,S$GLB,, | Performed by: NURSE PRACTITIONER

## 2025-08-01 PROCEDURE — 3008F BODY MASS INDEX DOCD: CPT | Mod: CPTII,S$GLB,, | Performed by: NURSE PRACTITIONER

## 2025-08-01 PROCEDURE — 1125F AMNT PAIN NOTED PAIN PRSNT: CPT | Mod: CPTII,S$GLB,, | Performed by: NURSE PRACTITIONER

## 2025-08-01 RX ORDER — FLUTICASONE PROPIONATE 50 MCG
2 SPRAY, SUSPENSION (ML) NASAL DAILY
Qty: 16 G | Refills: 0 | Status: SHIPPED | OUTPATIENT
Start: 2025-08-01

## 2025-08-01 RX ORDER — METHYLPREDNISOLONE ACETATE 80 MG/ML
80 INJECTION, SUSPENSION INTRA-ARTICULAR; INTRALESIONAL; INTRAMUSCULAR; SOFT TISSUE
Status: COMPLETED | OUTPATIENT
Start: 2025-08-01 | End: 2025-08-01

## 2025-08-01 RX ORDER — LORATADINE 10 MG/1
10 TABLET ORAL DAILY
Qty: 30 TABLET | Refills: 11 | Status: SHIPPED | OUTPATIENT
Start: 2025-08-01 | End: 2026-08-01

## 2025-08-01 RX ADMIN — METHYLPREDNISOLONE ACETATE 80 MG: 80 INJECTION, SUSPENSION INTRA-ARTICULAR; INTRALESIONAL; INTRAMUSCULAR; SOFT TISSUE at 12:08

## 2025-08-24 DIAGNOSIS — J06.9 UPPER RESPIRATORY TRACT INFECTION, UNSPECIFIED TYPE: ICD-10-CM

## 2025-08-25 RX ORDER — FLUTICASONE PROPIONATE 50 MCG
2 SPRAY, SUSPENSION (ML) NASAL
Qty: 48 ML | Refills: 1 | Status: SHIPPED | OUTPATIENT
Start: 2025-08-25

## 2025-08-30 DIAGNOSIS — R35.1 BENIGN PROSTATIC HYPERPLASIA WITH NOCTURIA: ICD-10-CM

## 2025-08-30 DIAGNOSIS — N40.1 BENIGN PROSTATIC HYPERPLASIA WITH NOCTURIA: ICD-10-CM

## 2025-09-02 RX ORDER — MIRABEGRON 50 MG/1
1 TABLET, FILM COATED, EXTENDED RELEASE ORAL
Qty: 90 TABLET | Refills: 1 | Status: SHIPPED | OUTPATIENT
Start: 2025-09-02